# Patient Record
Sex: FEMALE | Race: WHITE | NOT HISPANIC OR LATINO
[De-identification: names, ages, dates, MRNs, and addresses within clinical notes are randomized per-mention and may not be internally consistent; named-entity substitution may affect disease eponyms.]

---

## 2017-01-09 ENCOUNTER — NON-APPOINTMENT (OUTPATIENT)
Age: 57
End: 2017-01-09

## 2017-01-09 ENCOUNTER — APPOINTMENT (OUTPATIENT)
Dept: CARDIOLOGY | Facility: CLINIC | Age: 57
End: 2017-01-09

## 2017-01-09 VITALS
DIASTOLIC BLOOD PRESSURE: 70 MMHG | OXYGEN SATURATION: 98 % | BODY MASS INDEX: 24.66 KG/M2 | HEIGHT: 65 IN | HEART RATE: 65 BPM | WEIGHT: 148 LBS | SYSTOLIC BLOOD PRESSURE: 115 MMHG

## 2017-01-18 ENCOUNTER — APPOINTMENT (OUTPATIENT)
Dept: CARDIOLOGY | Facility: CLINIC | Age: 57
End: 2017-01-18

## 2017-01-24 ENCOUNTER — APPOINTMENT (OUTPATIENT)
Dept: ELECTROPHYSIOLOGY | Facility: CLINIC | Age: 57
End: 2017-01-24

## 2017-01-24 VITALS — HEART RATE: 65 BPM | SYSTOLIC BLOOD PRESSURE: 109 MMHG | DIASTOLIC BLOOD PRESSURE: 65 MMHG

## 2017-01-31 ENCOUNTER — APPOINTMENT (OUTPATIENT)
Dept: DERMATOLOGY | Facility: CLINIC | Age: 57
End: 2017-01-31

## 2017-01-31 VITALS — DIASTOLIC BLOOD PRESSURE: 60 MMHG | SYSTOLIC BLOOD PRESSURE: 110 MMHG

## 2017-01-31 DIAGNOSIS — B07.9 VIRAL WART, UNSPECIFIED: ICD-10-CM

## 2017-02-28 ENCOUNTER — APPOINTMENT (OUTPATIENT)
Dept: ELECTROPHYSIOLOGY | Facility: CLINIC | Age: 57
End: 2017-02-28

## 2017-03-17 ENCOUNTER — APPOINTMENT (OUTPATIENT)
Dept: ELECTROPHYSIOLOGY | Facility: CLINIC | Age: 57
End: 2017-03-17

## 2017-03-17 ENCOUNTER — NON-APPOINTMENT (OUTPATIENT)
Age: 57
End: 2017-03-17

## 2017-03-17 VITALS — DIASTOLIC BLOOD PRESSURE: 59 MMHG | SYSTOLIC BLOOD PRESSURE: 94 MMHG

## 2017-03-17 VITALS
WEIGHT: 143 LBS | SYSTOLIC BLOOD PRESSURE: 99 MMHG | BODY MASS INDEX: 23.82 KG/M2 | HEIGHT: 65 IN | OXYGEN SATURATION: 99 % | HEART RATE: 68 BPM | DIASTOLIC BLOOD PRESSURE: 61 MMHG

## 2017-03-18 LAB
ALBUMIN SERPL ELPH-MCNC: 4.3 G/DL
ALP BLD-CCNC: 72 U/L
ALT SERPL-CCNC: 12 U/L
ANION GAP SERPL CALC-SCNC: 16 MMOL/L
AST SERPL-CCNC: 22 U/L
BASOPHILS # BLD AUTO: 0.02 K/UL
BASOPHILS NFR BLD AUTO: 0.3 %
BILIRUB SERPL-MCNC: 0.2 MG/DL
BUN SERPL-MCNC: 15 MG/DL
CALCIUM SERPL-MCNC: 9.7 MG/DL
CHLORIDE SERPL-SCNC: 103 MMOL/L
CO2 SERPL-SCNC: 24 MMOL/L
CREAT SERPL-MCNC: 0.82 MG/DL
EOSINOPHIL # BLD AUTO: 0.08 K/UL
EOSINOPHIL NFR BLD AUTO: 1.2 %
GLUCOSE SERPL-MCNC: 54 MG/DL
HCT VFR BLD CALC: 37.3 %
HGB BLD-MCNC: 11.7 G/DL
IMM GRANULOCYTES NFR BLD AUTO: 0 %
LYMPHOCYTES # BLD AUTO: 1.46 K/UL
LYMPHOCYTES NFR BLD AUTO: 22.3 %
MAN DIFF?: NORMAL
MCHC RBC-ENTMCNC: 30.2 PG
MCHC RBC-ENTMCNC: 31.4 GM/DL
MCV RBC AUTO: 96.4 FL
MONOCYTES # BLD AUTO: 0.34 K/UL
MONOCYTES NFR BLD AUTO: 5.2 %
NEUTROPHILS # BLD AUTO: 4.65 K/UL
NEUTROPHILS NFR BLD AUTO: 71 %
PLATELET # BLD AUTO: 252 K/UL
POTASSIUM SERPL-SCNC: 4.5 MMOL/L
PROT SERPL-MCNC: 6.8 G/DL
RBC # BLD: 3.87 M/UL
RBC # FLD: 13 %
SODIUM SERPL-SCNC: 143 MMOL/L
TSH SERPL-ACNC: 2.03 UIU/ML
WBC # FLD AUTO: 6.55 K/UL

## 2017-04-05 ENCOUNTER — TRANSCRIPTION ENCOUNTER (OUTPATIENT)
Age: 57
End: 2017-04-05

## 2017-04-06 ENCOUNTER — INPATIENT (INPATIENT)
Facility: HOSPITAL | Age: 57
LOS: 0 days | Discharge: HOME | End: 2017-04-07
Attending: INTERNAL MEDICINE | Admitting: INTERNAL MEDICINE
Payer: COMMERCIAL

## 2017-04-06 VITALS
HEART RATE: 67 BPM | RESPIRATION RATE: 16 BRPM | TEMPERATURE: 98 F | SYSTOLIC BLOOD PRESSURE: 97 MMHG | OXYGEN SATURATION: 99 % | DIASTOLIC BLOOD PRESSURE: 60 MMHG

## 2017-04-06 DIAGNOSIS — I49.5 SICK SINUS SYNDROME: ICD-10-CM

## 2017-04-06 LAB — HCG UR QL: NEGATIVE — SIGNIFICANT CHANGE UP

## 2017-04-06 PROCEDURE — 93010 ELECTROCARDIOGRAM REPORT: CPT

## 2017-04-06 RX ORDER — ONDANSETRON 8 MG/1
4 TABLET, FILM COATED ORAL ONCE
Qty: 0 | Refills: 0 | Status: COMPLETED | OUTPATIENT
Start: 2017-04-06 | End: 2017-04-06

## 2017-04-06 RX ORDER — VANCOMYCIN HCL 1 G
1000 VIAL (EA) INTRAVENOUS ONCE
Qty: 0 | Refills: 0 | Status: COMPLETED | OUTPATIENT
Start: 2017-04-06 | End: 2017-04-06

## 2017-04-06 RX ORDER — FENTANYL CITRATE 50 UG/ML
25 INJECTION INTRAVENOUS ONCE
Qty: 0 | Refills: 0 | Status: DISCONTINUED | OUTPATIENT
Start: 2017-04-06 | End: 2017-04-06

## 2017-04-06 RX ORDER — ACETAMINOPHEN 500 MG
1000 TABLET ORAL ONCE
Qty: 0 | Refills: 0 | Status: COMPLETED | OUTPATIENT
Start: 2017-04-06 | End: 2017-04-06

## 2017-04-06 RX ORDER — KETOROLAC TROMETHAMINE 30 MG/ML
30 SYRINGE (ML) INJECTION ONCE
Qty: 0 | Refills: 0 | Status: DISCONTINUED | OUTPATIENT
Start: 2017-04-06 | End: 2017-04-06

## 2017-04-06 RX ORDER — SODIUM CHLORIDE 9 MG/ML
500 INJECTION INTRAMUSCULAR; INTRAVENOUS; SUBCUTANEOUS ONCE
Qty: 0 | Refills: 0 | Status: COMPLETED | OUTPATIENT
Start: 2017-04-06 | End: 2017-04-06

## 2017-04-06 RX ORDER — TRAMADOL HYDROCHLORIDE 50 MG/1
50 TABLET ORAL EVERY 6 HOURS
Qty: 0 | Refills: 0 | Status: DISCONTINUED | OUTPATIENT
Start: 2017-04-06 | End: 2017-04-07

## 2017-04-06 RX ORDER — SODIUM CHLORIDE 9 MG/ML
3 INJECTION INTRAMUSCULAR; INTRAVENOUS; SUBCUTANEOUS EVERY 8 HOURS
Qty: 0 | Refills: 0 | Status: DISCONTINUED | OUTPATIENT
Start: 2017-04-06 | End: 2017-04-07

## 2017-04-06 RX ORDER — HYDROMORPHONE HYDROCHLORIDE 2 MG/ML
2 INJECTION INTRAMUSCULAR; INTRAVENOUS; SUBCUTANEOUS
Qty: 0 | Refills: 0 | Status: DISCONTINUED | OUTPATIENT
Start: 2017-04-06 | End: 2017-04-07

## 2017-04-06 RX ORDER — LAMOTRIGINE 25 MG/1
100 TABLET, ORALLY DISINTEGRATING ORAL DAILY
Qty: 0 | Refills: 0 | Status: DISCONTINUED | OUTPATIENT
Start: 2017-04-06 | End: 2017-04-07

## 2017-04-06 RX ORDER — OXYCODONE HYDROCHLORIDE 5 MG/1
5 TABLET ORAL ONCE
Qty: 0 | Refills: 0 | Status: DISCONTINUED | OUTPATIENT
Start: 2017-04-06 | End: 2017-04-06

## 2017-04-06 RX ORDER — LAMOTRIGINE 25 MG/1
100 TABLET, ORALLY DISINTEGRATING ORAL ONCE
Qty: 0 | Refills: 0 | Status: COMPLETED | OUTPATIENT
Start: 2017-04-06 | End: 2017-04-06

## 2017-04-06 RX ORDER — TRAMADOL HYDROCHLORIDE 50 MG/1
50 TABLET ORAL EVERY 6 HOURS
Qty: 0 | Refills: 0 | Status: DISCONTINUED | OUTPATIENT
Start: 2017-04-06 | End: 2017-04-06

## 2017-04-06 RX ORDER — LAMOTRIGINE 25 MG/1
150 TABLET, ORALLY DISINTEGRATING ORAL AT BEDTIME
Qty: 0 | Refills: 0 | Status: DISCONTINUED | OUTPATIENT
Start: 2017-04-06 | End: 2017-04-07

## 2017-04-06 RX ADMIN — FENTANYL CITRATE 25 MICROGRAM(S): 50 INJECTION INTRAVENOUS at 12:58

## 2017-04-06 RX ADMIN — Medication 30 MILLIGRAM(S): at 13:19

## 2017-04-06 RX ADMIN — OXYCODONE HYDROCHLORIDE 5 MILLIGRAM(S): 5 TABLET ORAL at 16:32

## 2017-04-06 RX ADMIN — Medication 250 MILLIGRAM(S): at 18:09

## 2017-04-06 RX ADMIN — SODIUM CHLORIDE 3 MILLILITER(S): 9 INJECTION INTRAMUSCULAR; INTRAVENOUS; SUBCUTANEOUS at 21:31

## 2017-04-06 RX ADMIN — Medication 200 MILLIGRAM(S): at 13:12

## 2017-04-06 RX ADMIN — SODIUM CHLORIDE 1000 MILLILITER(S): 9 INJECTION INTRAMUSCULAR; INTRAVENOUS; SUBCUTANEOUS at 10:59

## 2017-04-06 RX ADMIN — LAMOTRIGINE 150 MILLIGRAM(S): 25 TABLET, ORALLY DISINTEGRATING ORAL at 22:03

## 2017-04-06 RX ADMIN — Medication 1000 MILLIGRAM(S): at 14:00

## 2017-04-06 RX ADMIN — Medication 30 MILLIGRAM(S): at 14:00

## 2017-04-06 RX ADMIN — HYDROMORPHONE HYDROCHLORIDE 2 MILLIGRAM(S): 2 INJECTION INTRAMUSCULAR; INTRAVENOUS; SUBCUTANEOUS at 21:46

## 2017-04-06 RX ADMIN — HYDROMORPHONE HYDROCHLORIDE 2 MILLIGRAM(S): 2 INJECTION INTRAMUSCULAR; INTRAVENOUS; SUBCUTANEOUS at 21:31

## 2017-04-06 RX ADMIN — OXYCODONE HYDROCHLORIDE 5 MILLIGRAM(S): 5 TABLET ORAL at 15:42

## 2017-04-06 RX ADMIN — TRAMADOL HYDROCHLORIDE 50 MILLIGRAM(S): 50 TABLET ORAL at 11:15

## 2017-04-06 RX ADMIN — LAMOTRIGINE 100 MILLIGRAM(S): 25 TABLET, ORALLY DISINTEGRATING ORAL at 11:15

## 2017-04-06 RX ADMIN — SODIUM CHLORIDE 3 MILLILITER(S): 9 INJECTION INTRAMUSCULAR; INTRAVENOUS; SUBCUTANEOUS at 14:00

## 2017-04-06 RX ADMIN — ONDANSETRON 4 MILLIGRAM(S): 8 TABLET, FILM COATED ORAL at 23:04

## 2017-04-06 RX ADMIN — TRAMADOL HYDROCHLORIDE 50 MILLIGRAM(S): 50 TABLET ORAL at 12:59

## 2017-04-06 NOTE — H&P CARDIOLOGY - PSH
h/o cardiac pacemaker insertion - 2004    H/O colonoscopy    History of D&C - 15 yrs ago    History of laparoscopy - many yrs ago

## 2017-04-06 NOTE — PATIENT PROFILE ADULT. - TEACHING/LEARNING LEARNING PREFERENCES
pictorial/computer/internet/video/audio/individual instruction/verbal instruction/skill demonstration

## 2017-04-06 NOTE — H&P CARDIOLOGY - PMH
Endometriosis, Mild    EP (Epilepsy)  - seizure x 2 - 2008, and in 2010    h/o - left Tennis Elbow    Hx of sleep apnea - had sleep study done in ? 2005 - does not use CPAP machine    Pacemaker - insertion in 2004

## 2017-04-06 NOTE — H&P CARDIOLOGY - HISTORY OF PRESENT ILLNESS
56 year old female with COPD, seizure disorder (last 7years ago), REJI, IUD in place, cervial spinal stenosis, brief paroxysmal AF and NSVT, PPM 2004 who presents for PPM generator change for VILMA.     Echo 1/18/17: EF 61%, minimal MR, mild TR, normal pulmonary pressures    please see hard copy H&P in paper chart 3/17/17  PATIENT SEEN AND EXAMINED AND NO NEW CLINICAL CHANGES SINCE office visit

## 2017-04-07 ENCOUNTER — TRANSCRIPTION ENCOUNTER (OUTPATIENT)
Age: 57
End: 2017-04-07

## 2017-04-07 VITALS
RESPIRATION RATE: 18 BRPM | DIASTOLIC BLOOD PRESSURE: 59 MMHG | HEART RATE: 65 BPM | TEMPERATURE: 99 F | OXYGEN SATURATION: 100 % | SYSTOLIC BLOOD PRESSURE: 99 MMHG

## 2017-04-07 LAB
BASOPHILS # BLD AUTO: 0.01 K/UL — SIGNIFICANT CHANGE UP (ref 0–0.2)
BASOPHILS NFR BLD AUTO: 0.1 % — SIGNIFICANT CHANGE UP (ref 0–2)
BUN SERPL-MCNC: 13 MG/DL — SIGNIFICANT CHANGE UP (ref 7–23)
CALCIUM SERPL-MCNC: 9.4 MG/DL — SIGNIFICANT CHANGE UP (ref 8.4–10.5)
CHLORIDE SERPL-SCNC: 100 MMOL/L — SIGNIFICANT CHANGE UP (ref 98–107)
CO2 SERPL-SCNC: 22 MMOL/L — SIGNIFICANT CHANGE UP (ref 22–31)
CREAT SERPL-MCNC: 0.7 MG/DL — SIGNIFICANT CHANGE UP (ref 0.5–1.3)
EOSINOPHIL # BLD AUTO: 0.01 K/UL — SIGNIFICANT CHANGE UP (ref 0–0.5)
EOSINOPHIL NFR BLD AUTO: 0.1 % — SIGNIFICANT CHANGE UP (ref 0–6)
GLUCOSE SERPL-MCNC: 95 MG/DL — SIGNIFICANT CHANGE UP (ref 70–99)
HCT VFR BLD CALC: 34.6 % — SIGNIFICANT CHANGE UP (ref 34.5–45)
HGB BLD-MCNC: 11.3 G/DL — LOW (ref 11.5–15.5)
IMM GRANULOCYTES NFR BLD AUTO: 0.1 % — SIGNIFICANT CHANGE UP (ref 0–1.5)
LYMPHOCYTES # BLD AUTO: 1.03 K/UL — SIGNIFICANT CHANGE UP (ref 1–3.3)
LYMPHOCYTES # BLD AUTO: 10.5 % — LOW (ref 13–44)
MAGNESIUM SERPL-MCNC: 2.1 MG/DL — SIGNIFICANT CHANGE UP (ref 1.6–2.6)
MCHC RBC-ENTMCNC: 30.5 PG — SIGNIFICANT CHANGE UP (ref 27–34)
MCHC RBC-ENTMCNC: 32.7 % — SIGNIFICANT CHANGE UP (ref 32–36)
MCV RBC AUTO: 93.5 FL — SIGNIFICANT CHANGE UP (ref 80–100)
MONOCYTES # BLD AUTO: 0.64 K/UL — SIGNIFICANT CHANGE UP (ref 0–0.9)
MONOCYTES NFR BLD AUTO: 6.5 % — SIGNIFICANT CHANGE UP (ref 2–14)
NEUTROPHILS # BLD AUTO: 8.12 K/UL — HIGH (ref 1.8–7.4)
NEUTROPHILS NFR BLD AUTO: 82.7 % — HIGH (ref 43–77)
PLATELET # BLD AUTO: 228 K/UL — SIGNIFICANT CHANGE UP (ref 150–400)
PMV BLD: 10.1 FL — SIGNIFICANT CHANGE UP (ref 7–13)
POTASSIUM SERPL-MCNC: 4.7 MMOL/L — SIGNIFICANT CHANGE UP (ref 3.5–5.3)
POTASSIUM SERPL-SCNC: 4.7 MMOL/L — SIGNIFICANT CHANGE UP (ref 3.5–5.3)
RBC # BLD: 3.7 M/UL — LOW (ref 3.8–5.2)
RBC # FLD: 12.6 % — SIGNIFICANT CHANGE UP (ref 10.3–14.5)
SODIUM SERPL-SCNC: 140 MMOL/L — SIGNIFICANT CHANGE UP (ref 135–145)
WBC # BLD: 9.82 K/UL — SIGNIFICANT CHANGE UP (ref 3.8–10.5)
WBC # FLD AUTO: 9.82 K/UL — SIGNIFICANT CHANGE UP (ref 3.8–10.5)

## 2017-04-07 RX ORDER — ONDANSETRON 8 MG/1
4 TABLET, FILM COATED ORAL ONCE
Qty: 0 | Refills: 0 | Status: COMPLETED | OUTPATIENT
Start: 2017-04-07 | End: 2017-04-07

## 2017-04-07 RX ORDER — IBUPROFEN 200 MG
1 TABLET ORAL
Qty: 30 | Refills: 0 | OUTPATIENT
Start: 2017-04-07

## 2017-04-07 RX ORDER — ONDANSETRON 8 MG/1
1 TABLET, FILM COATED ORAL
Qty: 32 | Refills: 0 | OUTPATIENT
Start: 2017-04-07 | End: 2017-04-15

## 2017-04-07 RX ORDER — OXYCODONE HYDROCHLORIDE 5 MG/1
1 TABLET ORAL
Qty: 12 | Refills: 0 | OUTPATIENT
Start: 2017-04-07 | End: 2017-04-10

## 2017-04-07 RX ADMIN — LAMOTRIGINE 100 MILLIGRAM(S): 25 TABLET, ORALLY DISINTEGRATING ORAL at 11:23

## 2017-04-07 RX ADMIN — ONDANSETRON 4 MILLIGRAM(S): 8 TABLET, FILM COATED ORAL at 11:38

## 2017-04-07 RX ADMIN — SODIUM CHLORIDE 3 MILLILITER(S): 9 INJECTION INTRAMUSCULAR; INTRAVENOUS; SUBCUTANEOUS at 13:19

## 2017-04-07 RX ADMIN — ONDANSETRON 4 MILLIGRAM(S): 8 TABLET, FILM COATED ORAL at 03:51

## 2017-04-07 RX ADMIN — SODIUM CHLORIDE 3 MILLILITER(S): 9 INJECTION INTRAMUSCULAR; INTRAVENOUS; SUBCUTANEOUS at 05:15

## 2017-04-07 NOTE — DISCHARGE NOTE ADULT - CARE PROVIDERS DIRECT ADDRESSES
[email or web address]

## 2017-04-07 NOTE — DISCHARGE NOTE ADULT - HOSPITAL COURSE
This is a 57 yo f s/p Generator change. Patient stable for discharge to home on 4/7. Patient had subpectoral PPM placement.

## 2017-04-07 NOTE — DISCHARGE NOTE ADULT - CARE PROVIDER_API CALL
Catracho Khan (MD), Cardiac Electrophysiology; Cardiovascular Disease; Internal Medicine  [address] [place] [address]  Phone: [phone]  Fax: [phone]

## 2017-04-07 NOTE — DISCHARGE NOTE ADULT - PATIENT PORTAL LINK FT
“You can access the FollowHealth Patient Portal, offered by [facility], by registering with the following website: [email or web address]”

## 2017-04-07 NOTE — DISCHARGE NOTE ADULT - CARE PLAN
Principal Discharge DX:	Pacemaker at end of battery life  Goal:	follow up outpatient  Instructions for follow-up, activity and diet:	4/20 at 8:45 am at the device clinic

## 2017-04-07 NOTE — DISCHARGE NOTE ADULT - MEDICATION SUMMARY - MEDICATIONS TO TAKE
I will START or STAY ON the medications listed below when I get home from the hospital:    acetaminophen-oxyCODONE 325 mg-5 mg oral tablet  -- 1 tab(s) by mouth every 6 hours -for moderate pain MDD:4 tablets  -- Caution federal law prohibits the transfer of this drug to any person other  than the person for whom it was prescribed.  May cause drowsiness.  Alcohol may intensify this effect.  Use care when operating dangerous machinery.  This prescription cannot be refilled.  This product contains acetaminophen.  Do not use  with any other product containing acetaminophen to prevent possible liver damage.  Using more of this medication than prescribed may cause serious breathing problems.    -- Indication: For pain     ibuprofen 400 mg oral tablet  -- 1 tab(s) by mouth every 6 hours as needed for moderate pain   -- Do not take this drug if you are pregnant.  It is very important that you take or use this exactly as directed.  Do not skip doses or discontinue unless directed by your doctor.  May cause drowsiness or dizziness.  Obtain medical advice before taking any non-prescription drugs as some may affect the action of this medication.  Take with food or milk.    -- Indication: For pain     lamoTRIgine 100 mg oral tablet  -- 1 tab(s) by mouth once a day (in the morning)  -- Indication: For Seizure    lamoTRIgine 150 mg oral tablet  -- 1 tab(s) by mouth once a day (at bedtime)  -- Indication: For Seizure    atenolol 25 mg oral tablet  -- 1 tab(s) by mouth once a day  -- Indication: For HTN I will START or STAY ON the medications listed below when I get home from the hospital:    acetaminophen-oxyCODONE 325 mg-5 mg oral tablet  -- 1 tab(s) by mouth every 6 hours -for moderate pain MDD:4 tablets  -- Caution federal law prohibits the transfer of this drug to any person other  than the person for whom it was prescribed.  May cause drowsiness.  Alcohol may intensify this effect.  Use care when operating dangerous machinery.  This prescription cannot be refilled.  This product contains acetaminophen.  Do not use  with any other product containing acetaminophen to prevent possible liver damage.  Using more of this medication than prescribed may cause serious breathing problems.    -- Indication: For pain     ibuprofen 400 mg oral tablet  -- 1 tab(s) by mouth every 6 hours as needed for moderate pain   -- Do not take this drug if you are pregnant.  It is very important that you take or use this exactly as directed.  Do not skip doses or discontinue unless directed by your doctor.  May cause drowsiness or dizziness.  Obtain medical advice before taking any non-prescription drugs as some may affect the action of this medication.  Take with food or milk.    -- Indication: For pain     lamoTRIgine 100 mg oral tablet  -- 1 tab(s) by mouth once a day (in the morning)  -- Indication: For Seizure    lamoTRIgine 150 mg oral tablet  -- 1 tab(s) by mouth once a day (at bedtime)  -- Indication: For Seizure    Zofran 4 mg oral tablet  -- 1 tab(s) by mouth every 6 hours as needed for nausea  -- Indication: For nausea    atenolol 25 mg oral tablet  -- 1 tab(s) by mouth once a day  -- Indication: For HTN

## 2017-04-17 ENCOUNTER — APPOINTMENT (OUTPATIENT)
Dept: DERMATOLOGY | Facility: CLINIC | Age: 57
End: 2017-04-17

## 2017-04-17 ENCOUNTER — APPOINTMENT (OUTPATIENT)
Dept: ELECTROPHYSIOLOGY | Facility: CLINIC | Age: 57
End: 2017-04-17

## 2017-04-17 VITALS — SYSTOLIC BLOOD PRESSURE: 100 MMHG | DIASTOLIC BLOOD PRESSURE: 60 MMHG

## 2017-04-17 DIAGNOSIS — I78.1 NEVUS, NON-NEOPLASTIC: ICD-10-CM

## 2017-06-20 ENCOUNTER — APPOINTMENT (OUTPATIENT)
Dept: DERMATOLOGY | Facility: CLINIC | Age: 57
End: 2017-06-20

## 2017-06-20 VITALS — SYSTOLIC BLOOD PRESSURE: 100 MMHG | DIASTOLIC BLOOD PRESSURE: 52 MMHG

## 2017-06-20 DIAGNOSIS — L66.1 LICHEN PLANOPILARIS: ICD-10-CM

## 2017-06-20 DIAGNOSIS — L65.9 NONSCARRING HAIR LOSS, UNSPECIFIED: ICD-10-CM

## 2017-06-30 ENCOUNTER — RX RENEWAL (OUTPATIENT)
Age: 57
End: 2017-06-30

## 2017-07-31 ENCOUNTER — APPOINTMENT (OUTPATIENT)
Dept: ELECTROPHYSIOLOGY | Facility: CLINIC | Age: 57
End: 2017-07-31
Payer: COMMERCIAL

## 2017-07-31 PROCEDURE — 93294 REM INTERROG EVL PM/LDLS PM: CPT

## 2017-07-31 PROCEDURE — 93296 REM INTERROG EVL PM/IDS: CPT

## 2017-08-07 ENCOUNTER — APPOINTMENT (OUTPATIENT)
Dept: ELECTROPHYSIOLOGY | Facility: CLINIC | Age: 57
End: 2017-08-07

## 2017-08-08 ENCOUNTER — APPOINTMENT (OUTPATIENT)
Dept: ELECTROPHYSIOLOGY | Facility: CLINIC | Age: 57
End: 2017-08-08

## 2017-08-16 ENCOUNTER — APPOINTMENT (OUTPATIENT)
Dept: NEUROLOGY | Facility: CLINIC | Age: 57
End: 2017-08-16

## 2017-08-28 ENCOUNTER — APPOINTMENT (OUTPATIENT)
Dept: OPHTHALMOLOGY | Facility: CLINIC | Age: 57
End: 2017-08-28
Payer: COMMERCIAL

## 2017-08-28 DIAGNOSIS — L03.213 PERIORBITAL CELLULITIS: ICD-10-CM

## 2017-08-28 DIAGNOSIS — H00.15 CHALAZION LEFT LOWER EYELID: ICD-10-CM

## 2017-08-28 PROCEDURE — 92012 INTRM OPH EXAM EST PATIENT: CPT

## 2017-09-12 ENCOUNTER — APPOINTMENT (OUTPATIENT)
Dept: NEUROLOGY | Facility: CLINIC | Age: 57
End: 2017-09-12

## 2017-09-26 ENCOUNTER — TRANSCRIPTION ENCOUNTER (OUTPATIENT)
Age: 57
End: 2017-09-26

## 2017-09-28 ENCOUNTER — APPOINTMENT (OUTPATIENT)
Dept: OPHTHALMOLOGY | Facility: CLINIC | Age: 57
End: 2017-09-28

## 2017-10-30 ENCOUNTER — APPOINTMENT (OUTPATIENT)
Dept: ELECTROPHYSIOLOGY | Facility: CLINIC | Age: 57
End: 2017-10-30
Payer: COMMERCIAL

## 2017-10-30 PROCEDURE — 93280 PM DEVICE PROGR EVAL DUAL: CPT

## 2017-11-29 ENCOUNTER — TRANSCRIPTION ENCOUNTER (OUTPATIENT)
Age: 57
End: 2017-11-29

## 2017-12-04 ENCOUNTER — APPOINTMENT (OUTPATIENT)
Dept: CARDIOLOGY | Facility: CLINIC | Age: 57
End: 2017-12-04
Payer: COMMERCIAL

## 2017-12-04 ENCOUNTER — NON-APPOINTMENT (OUTPATIENT)
Age: 57
End: 2017-12-04

## 2017-12-04 VITALS
WEIGHT: 143 LBS | HEART RATE: 69 BPM | OXYGEN SATURATION: 100 % | BODY MASS INDEX: 23.82 KG/M2 | SYSTOLIC BLOOD PRESSURE: 120 MMHG | HEIGHT: 65 IN | DIASTOLIC BLOOD PRESSURE: 71 MMHG

## 2017-12-04 PROCEDURE — 93000 ELECTROCARDIOGRAM COMPLETE: CPT

## 2017-12-04 PROCEDURE — 99214 OFFICE O/P EST MOD 30 MIN: CPT

## 2017-12-05 ENCOUNTER — RESULT CHARGE (OUTPATIENT)
Age: 57
End: 2017-12-05

## 2017-12-09 ENCOUNTER — OTHER (OUTPATIENT)
Age: 57
End: 2017-12-09

## 2017-12-11 ENCOUNTER — APPOINTMENT (OUTPATIENT)
Dept: MAMMOGRAPHY | Facility: CLINIC | Age: 57
End: 2017-12-11
Payer: COMMERCIAL

## 2017-12-11 ENCOUNTER — APPOINTMENT (OUTPATIENT)
Dept: ULTRASOUND IMAGING | Facility: CLINIC | Age: 57
End: 2017-12-11
Payer: COMMERCIAL

## 2017-12-11 ENCOUNTER — OUTPATIENT (OUTPATIENT)
Dept: OUTPATIENT SERVICES | Facility: HOSPITAL | Age: 57
LOS: 1 days | End: 2017-12-11
Payer: COMMERCIAL

## 2017-12-11 DIAGNOSIS — Z12.31 ENCOUNTER FOR SCREENING MAMMOGRAM FOR MALIGNANT NEOPLASM OF BREAST: ICD-10-CM

## 2017-12-11 DIAGNOSIS — N60.19 DIFFUSE CYSTIC MASTOPATHY OF UNSPECIFIED BREAST: ICD-10-CM

## 2017-12-11 PROCEDURE — 77067 SCR MAMMO BI INCL CAD: CPT

## 2017-12-11 PROCEDURE — G0202: CPT | Mod: 26

## 2017-12-11 PROCEDURE — 76641 ULTRASOUND BREAST COMPLETE: CPT | Mod: 26,50

## 2017-12-11 PROCEDURE — 77063 BREAST TOMOSYNTHESIS BI: CPT | Mod: 26

## 2017-12-11 PROCEDURE — 77063 BREAST TOMOSYNTHESIS BI: CPT

## 2017-12-11 PROCEDURE — 76641 ULTRASOUND BREAST COMPLETE: CPT

## 2018-01-03 ENCOUNTER — RX RENEWAL (OUTPATIENT)
Age: 58
End: 2018-01-03

## 2018-01-15 ENCOUNTER — APPOINTMENT (OUTPATIENT)
Dept: CARDIOLOGY | Facility: CLINIC | Age: 58
End: 2018-01-15

## 2018-01-17 ENCOUNTER — RESULT REVIEW (OUTPATIENT)
Age: 58
End: 2018-01-17

## 2018-01-22 ENCOUNTER — APPOINTMENT (OUTPATIENT)
Dept: CARDIOLOGY | Facility: CLINIC | Age: 58
End: 2018-01-22
Payer: COMMERCIAL

## 2018-01-22 ENCOUNTER — NON-APPOINTMENT (OUTPATIENT)
Age: 58
End: 2018-01-22

## 2018-01-22 VITALS
HEIGHT: 65 IN | HEART RATE: 65 BPM | DIASTOLIC BLOOD PRESSURE: 69 MMHG | OXYGEN SATURATION: 99 % | SYSTOLIC BLOOD PRESSURE: 119 MMHG | WEIGHT: 145 LBS | BODY MASS INDEX: 24.16 KG/M2

## 2018-01-22 PROCEDURE — 93000 ELECTROCARDIOGRAM COMPLETE: CPT

## 2018-01-22 PROCEDURE — 99214 OFFICE O/P EST MOD 30 MIN: CPT

## 2018-01-29 ENCOUNTER — APPOINTMENT (OUTPATIENT)
Dept: CARDIOLOGY | Facility: CLINIC | Age: 58
End: 2018-01-29
Payer: COMMERCIAL

## 2018-01-29 PROCEDURE — 93306 TTE W/DOPPLER COMPLETE: CPT

## 2018-02-01 ENCOUNTER — APPOINTMENT (OUTPATIENT)
Dept: ULTRASOUND IMAGING | Facility: CLINIC | Age: 58
End: 2018-02-01

## 2018-02-01 ENCOUNTER — OUTPATIENT (OUTPATIENT)
Dept: OUTPATIENT SERVICES | Facility: HOSPITAL | Age: 58
LOS: 1 days | End: 2018-02-01
Payer: COMMERCIAL

## 2018-02-01 ENCOUNTER — APPOINTMENT (OUTPATIENT)
Dept: ELECTROPHYSIOLOGY | Facility: CLINIC | Age: 58
End: 2018-02-01
Payer: COMMERCIAL

## 2018-02-01 DIAGNOSIS — Z00.8 ENCOUNTER FOR OTHER GENERAL EXAMINATION: ICD-10-CM

## 2018-02-01 PROCEDURE — 76700 US EXAM ABDOM COMPLETE: CPT

## 2018-02-01 PROCEDURE — 93296 REM INTERROG EVL PM/IDS: CPT

## 2018-02-01 PROCEDURE — 76700 US EXAM ABDOM COMPLETE: CPT | Mod: 26

## 2018-02-01 PROCEDURE — 93294 REM INTERROG EVL PM/LDLS PM: CPT

## 2018-02-13 ENCOUNTER — APPOINTMENT (OUTPATIENT)
Dept: GASTROENTEROLOGY | Facility: CLINIC | Age: 58
End: 2018-02-13
Payer: COMMERCIAL

## 2018-02-13 DIAGNOSIS — R10.11 RIGHT UPPER QUADRANT PAIN: ICD-10-CM

## 2018-02-13 DIAGNOSIS — R19.4 CHANGE IN BOWEL HABIT: ICD-10-CM

## 2018-02-13 PROCEDURE — 99244 OFF/OP CNSLTJ NEW/EST MOD 40: CPT

## 2018-02-13 RX ORDER — CEPHALEXIN 500 MG/1
500 CAPSULE ORAL 3 TIMES DAILY
Qty: 30 | Refills: 1 | Status: DISCONTINUED | COMMUNITY
Start: 2017-08-28 | End: 2018-02-13

## 2018-02-20 ENCOUNTER — OTHER (OUTPATIENT)
Age: 58
End: 2018-02-20

## 2018-02-22 ENCOUNTER — FORM ENCOUNTER (OUTPATIENT)
Age: 58
End: 2018-02-22

## 2018-02-23 ENCOUNTER — OUTPATIENT (OUTPATIENT)
Dept: OUTPATIENT SERVICES | Facility: HOSPITAL | Age: 58
LOS: 1 days | End: 2018-02-23
Payer: COMMERCIAL

## 2018-02-23 ENCOUNTER — APPOINTMENT (OUTPATIENT)
Dept: NUCLEAR MEDICINE | Facility: HOSPITAL | Age: 58
End: 2018-02-23

## 2018-02-23 DIAGNOSIS — Z00.00 ENCOUNTER FOR GENERAL ADULT MEDICAL EXAMINATION WITHOUT ABNORMAL FINDINGS: ICD-10-CM

## 2018-02-23 DIAGNOSIS — K82.8 OTHER SPECIFIED DISEASES OF GALLBLADDER: ICD-10-CM

## 2018-02-23 PROCEDURE — A9537: CPT

## 2018-02-23 PROCEDURE — 78227 HEPATOBIL SYST IMAGE W/DRUG: CPT | Mod: 26

## 2018-02-23 PROCEDURE — 78227 HEPATOBIL SYST IMAGE W/DRUG: CPT

## 2018-03-06 ENCOUNTER — APPOINTMENT (OUTPATIENT)
Dept: GASTROENTEROLOGY | Facility: HOSPITAL | Age: 58
End: 2018-03-06

## 2018-03-06 ENCOUNTER — OUTPATIENT (OUTPATIENT)
Dept: OUTPATIENT SERVICES | Facility: HOSPITAL | Age: 58
LOS: 1 days | End: 2018-03-06
Payer: COMMERCIAL

## 2018-03-06 ENCOUNTER — RESULT REVIEW (OUTPATIENT)
Age: 58
End: 2018-03-06

## 2018-03-06 DIAGNOSIS — R10.9 UNSPECIFIED ABDOMINAL PAIN: ICD-10-CM

## 2018-03-06 PROCEDURE — 43259 EGD US EXAM DUODENUM/JEJUNUM: CPT | Mod: GC

## 2018-03-06 PROCEDURE — 43239 EGD BIOPSY SINGLE/MULTIPLE: CPT | Mod: 59,GC

## 2018-03-06 PROCEDURE — 43259 EGD US EXAM DUODENUM/JEJUNUM: CPT

## 2018-03-06 PROCEDURE — 43239 EGD BIOPSY SINGLE/MULTIPLE: CPT | Mod: XS

## 2018-03-20 ENCOUNTER — APPOINTMENT (OUTPATIENT)
Dept: GASTROENTEROLOGY | Facility: HOSPITAL | Age: 58
End: 2018-03-20

## 2018-04-12 ENCOUNTER — TRANSCRIPTION ENCOUNTER (OUTPATIENT)
Age: 58
End: 2018-04-12

## 2018-04-13 ENCOUNTER — APPOINTMENT (OUTPATIENT)
Dept: GASTROENTEROLOGY | Facility: HOSPITAL | Age: 58
End: 2018-04-13

## 2018-04-13 ENCOUNTER — RESULT REVIEW (OUTPATIENT)
Age: 58
End: 2018-04-13

## 2018-04-13 ENCOUNTER — OUTPATIENT (OUTPATIENT)
Dept: OUTPATIENT SERVICES | Facility: HOSPITAL | Age: 58
LOS: 1 days | End: 2018-04-13
Payer: COMMERCIAL

## 2018-04-13 DIAGNOSIS — D64.9 ANEMIA, UNSPECIFIED: ICD-10-CM

## 2018-04-13 LAB — HCG SERPL-ACNC: <1 MIU/ML — SIGNIFICANT CHANGE UP

## 2018-04-13 PROCEDURE — 88305 TISSUE EXAM BY PATHOLOGIST: CPT | Mod: 26

## 2018-04-13 PROCEDURE — 45385 COLONOSCOPY W/LESION REMOVAL: CPT

## 2018-04-13 PROCEDURE — 84702 CHORIONIC GONADOTROPIN TEST: CPT

## 2018-04-30 ENCOUNTER — APPOINTMENT (OUTPATIENT)
Dept: ELECTROPHYSIOLOGY | Facility: CLINIC | Age: 58
End: 2018-04-30

## 2018-05-01 ENCOUNTER — APPOINTMENT (OUTPATIENT)
Dept: NEUROLOGY | Facility: CLINIC | Age: 58
End: 2018-05-01
Payer: COMMERCIAL

## 2018-05-01 DIAGNOSIS — M48.02 SPINAL STENOSIS, CERVICAL REGION: ICD-10-CM

## 2018-05-01 DIAGNOSIS — G56.10 OTHER LESIONS OF MEDIAN NERVE, UNSPECIFIED UPPER LIMB: ICD-10-CM

## 2018-05-01 PROCEDURE — 99215 OFFICE O/P EST HI 40 MIN: CPT

## 2018-05-07 ENCOUNTER — APPOINTMENT (OUTPATIENT)
Dept: GASTROENTEROLOGY | Facility: CLINIC | Age: 58
End: 2018-05-07
Payer: COMMERCIAL

## 2018-05-07 VITALS
OXYGEN SATURATION: 99 % | DIASTOLIC BLOOD PRESSURE: 69 MMHG | SYSTOLIC BLOOD PRESSURE: 114 MMHG | HEART RATE: 65 BPM | WEIGHT: 144 LBS | BODY MASS INDEX: 23.99 KG/M2 | HEIGHT: 65 IN

## 2018-05-07 DIAGNOSIS — D12.6 BENIGN NEOPLASM OF COLON, UNSPECIFIED: ICD-10-CM

## 2018-05-07 PROCEDURE — 99214 OFFICE O/P EST MOD 30 MIN: CPT

## 2018-05-08 PROBLEM — D12.6 TUBULAR ADENOMA OF COLON: Status: ACTIVE | Noted: 2018-05-08

## 2018-05-09 ENCOUNTER — APPOINTMENT (OUTPATIENT)
Dept: BARIATRICS | Facility: CLINIC | Age: 58
End: 2018-05-09
Payer: COMMERCIAL

## 2018-05-09 VITALS
DIASTOLIC BLOOD PRESSURE: 64 MMHG | WEIGHT: 140.65 LBS | HEART RATE: 65 BPM | SYSTOLIC BLOOD PRESSURE: 108 MMHG | HEIGHT: 65 IN | BODY MASS INDEX: 23.43 KG/M2 | OXYGEN SATURATION: 97 %

## 2018-05-09 PROCEDURE — 99243 OFF/OP CNSLTJ NEW/EST LOW 30: CPT

## 2018-05-09 PROCEDURE — ZZZZZ: CPT

## 2018-05-10 LAB
H.PYLORI ANTIBODY IGA: 9.3 UNITS
IF BLOCK AB SER QL: NORMAL
PCA AB SER QL IF: NORMAL
VIT B12 SERPL-MCNC: 1167 PG/ML

## 2018-05-21 ENCOUNTER — APPOINTMENT (OUTPATIENT)
Dept: GASTROENTEROLOGY | Facility: CLINIC | Age: 58
End: 2018-05-21

## 2018-05-23 ENCOUNTER — APPOINTMENT (OUTPATIENT)
Dept: ELECTROPHYSIOLOGY | Facility: CLINIC | Age: 58
End: 2018-05-23
Payer: COMMERCIAL

## 2018-05-23 VITALS — RESPIRATION RATE: 14 BRPM | HEART RATE: 62 BPM | SYSTOLIC BLOOD PRESSURE: 117 MMHG | DIASTOLIC BLOOD PRESSURE: 69 MMHG

## 2018-05-23 PROCEDURE — 93280 PM DEVICE PROGR EVAL DUAL: CPT

## 2018-05-23 RX ORDER — BROMPHENIRAMINE MALEATE, PSEUDOEPHEDRINE HYDROCHLORIDE, 2; 30; 10 MG/5ML; MG/5ML; MG/5ML
30-2-10 SYRUP ORAL
Qty: 200 | Refills: 0 | Status: DISCONTINUED | COMMUNITY
Start: 2017-11-29

## 2018-05-23 RX ORDER — TOBRAMYCIN AND DEXAMETHASONE 3; 1 MG/ML; MG/ML
0.3-0.1 SUSPENSION/ DROPS OPHTHALMIC
Qty: 5 | Refills: 0 | Status: DISCONTINUED | COMMUNITY
Start: 2018-05-03

## 2018-05-23 RX ORDER — AMOXICILLIN AND CLAVULANATE POTASSIUM 875; 125 MG/1; MG/1
875-125 TABLET, COATED ORAL
Qty: 14 | Refills: 0 | Status: DISCONTINUED | COMMUNITY
Start: 2017-11-29

## 2018-05-24 LAB
BASOPHILS # BLD AUTO: 0.02 K/UL
BASOPHILS NFR BLD AUTO: 0.3 %
EOSINOPHIL # BLD AUTO: 0.07 K/UL
EOSINOPHIL NFR BLD AUTO: 1.2 %
FERRITIN SERPL-MCNC: 29 NG/ML
HCT VFR BLD CALC: 34 %
HEMOCCULT STL QL IA: NEGATIVE
HGB BLD-MCNC: 11 G/DL
IMM GRANULOCYTES NFR BLD AUTO: 0.2 %
IRON SATN MFR SERPL: 23 %
IRON SERPL-MCNC: 64 UG/DL
LYMPHOCYTES # BLD AUTO: 2 K/UL
LYMPHOCYTES NFR BLD AUTO: 34.5 %
MAN DIFF?: NORMAL
MCHC RBC-ENTMCNC: 29 PG
MCHC RBC-ENTMCNC: 32.4 GM/DL
MCV RBC AUTO: 89.7 FL
MONOCYTES # BLD AUTO: 0.39 K/UL
MONOCYTES NFR BLD AUTO: 6.7 %
NEUTROPHILS # BLD AUTO: 3.3 K/UL
NEUTROPHILS NFR BLD AUTO: 57.1 %
PLATELET # BLD AUTO: 237 K/UL
RBC # BLD: 3.79 M/UL
RBC # FLD: 15.6 %
TIBC SERPL-MCNC: 282 UG/DL
UIBC SERPL-MCNC: 218 UG/DL
WBC # FLD AUTO: 5.79 K/UL

## 2018-06-26 ENCOUNTER — APPOINTMENT (OUTPATIENT)
Dept: NEUROLOGY | Facility: CLINIC | Age: 58
End: 2018-06-26
Payer: COMMERCIAL

## 2018-06-26 PROCEDURE — 95908 NRV CNDJ TST 3-4 STUDIES: CPT

## 2018-06-26 PROCEDURE — 95886 MUSC TEST DONE W/N TEST COMP: CPT

## 2018-06-28 ENCOUNTER — APPOINTMENT (OUTPATIENT)
Dept: NEUROLOGY | Facility: CLINIC | Age: 58
End: 2018-06-28
Payer: COMMERCIAL

## 2018-06-28 PROCEDURE — 95938 SOMATOSENSORY TESTING: CPT

## 2018-07-17 ENCOUNTER — APPOINTMENT (OUTPATIENT)
Dept: NEUROSURGERY | Facility: CLINIC | Age: 58
End: 2018-07-17
Payer: COMMERCIAL

## 2018-07-17 VITALS
WEIGHT: 139 LBS | OXYGEN SATURATION: 98 % | DIASTOLIC BLOOD PRESSURE: 73 MMHG | HEIGHT: 65 IN | HEART RATE: 64 BPM | BODY MASS INDEX: 23.16 KG/M2 | TEMPERATURE: 98.2 F | SYSTOLIC BLOOD PRESSURE: 130 MMHG

## 2018-07-17 PROCEDURE — 99244 OFF/OP CNSLTJ NEW/EST MOD 40: CPT | Mod: 57

## 2018-07-20 ENCOUNTER — APPOINTMENT (OUTPATIENT)
Dept: ORTHOPEDIC SURGERY | Facility: CLINIC | Age: 58
End: 2018-07-20
Payer: COMMERCIAL

## 2018-07-20 VITALS
DIASTOLIC BLOOD PRESSURE: 57 MMHG | SYSTOLIC BLOOD PRESSURE: 95 MMHG | WEIGHT: 137 LBS | BODY MASS INDEX: 22.82 KG/M2 | HEART RATE: 66 BPM | HEIGHT: 65 IN

## 2018-07-20 DIAGNOSIS — Z82.61 FAMILY HISTORY OF ARTHRITIS: ICD-10-CM

## 2018-07-20 DIAGNOSIS — Z78.9 OTHER SPECIFIED HEALTH STATUS: ICD-10-CM

## 2018-07-20 DIAGNOSIS — Z87.39 PERSONAL HISTORY OF OTHER DISEASES OF THE MUSCULOSKELETAL SYSTEM AND CONNECTIVE TISSUE: ICD-10-CM

## 2018-07-20 DIAGNOSIS — M47.10 OTHER SPONDYLOSIS WITH MYELOPATHY, SITE UNSPECIFIED: ICD-10-CM

## 2018-07-20 PROCEDURE — 99204 OFFICE O/P NEW MOD 45 MIN: CPT

## 2018-07-21 PROBLEM — M47.10 SPONDYLOSIS WITH MYELOPATHY: Status: ACTIVE | Noted: 2018-07-21

## 2018-07-24 PROBLEM — Z78.9 DOES NOT USE ILLICIT DRUGS: Status: ACTIVE | Noted: 2018-07-20

## 2018-07-24 PROBLEM — Z82.61 FAMILY HISTORY OF ARTHRITIS: Status: ACTIVE | Noted: 2018-07-20

## 2018-07-24 PROBLEM — Z87.39 HISTORY OF HERNIATED INTERVERTEBRAL DISC: Status: RESOLVED | Noted: 2018-07-20 | Resolved: 2018-07-24

## 2018-07-26 ENCOUNTER — RX RENEWAL (OUTPATIENT)
Age: 58
End: 2018-07-26

## 2018-07-26 ENCOUNTER — MEDICATION RENEWAL (OUTPATIENT)
Age: 58
End: 2018-07-26

## 2018-07-31 ENCOUNTER — APPOINTMENT (OUTPATIENT)
Dept: ELECTROPHYSIOLOGY | Facility: CLINIC | Age: 58
End: 2018-07-31
Payer: COMMERCIAL

## 2018-07-31 PROCEDURE — 93294 REM INTERROG EVL PM/LDLS PM: CPT

## 2018-07-31 PROCEDURE — 93296 REM INTERROG EVL PM/IDS: CPT

## 2018-08-09 ENCOUNTER — RX RENEWAL (OUTPATIENT)
Age: 58
End: 2018-08-09

## 2018-08-15 ENCOUNTER — APPOINTMENT (OUTPATIENT)
Dept: CARDIOLOGY | Facility: CLINIC | Age: 58
End: 2018-08-15
Payer: COMMERCIAL

## 2018-08-15 ENCOUNTER — NON-APPOINTMENT (OUTPATIENT)
Age: 58
End: 2018-08-15

## 2018-08-15 VITALS
HEART RATE: 65 BPM | BODY MASS INDEX: 22.99 KG/M2 | OXYGEN SATURATION: 99 % | HEIGHT: 65 IN | DIASTOLIC BLOOD PRESSURE: 72 MMHG | WEIGHT: 138 LBS | SYSTOLIC BLOOD PRESSURE: 121 MMHG

## 2018-08-15 PROCEDURE — 93000 ELECTROCARDIOGRAM COMPLETE: CPT

## 2018-08-15 PROCEDURE — 99213 OFFICE O/P EST LOW 20 MIN: CPT

## 2018-08-17 ENCOUNTER — RESULT CHARGE (OUTPATIENT)
Age: 58
End: 2018-08-17

## 2018-10-03 ENCOUNTER — CLINICAL ADVICE (OUTPATIENT)
Age: 58
End: 2018-10-03

## 2018-10-25 ENCOUNTER — NON-APPOINTMENT (OUTPATIENT)
Age: 58
End: 2018-10-25

## 2018-10-25 ENCOUNTER — APPOINTMENT (OUTPATIENT)
Dept: INTERNAL MEDICINE | Facility: CLINIC | Age: 58
End: 2018-10-25
Payer: COMMERCIAL

## 2018-10-25 VITALS
DIASTOLIC BLOOD PRESSURE: 58 MMHG | HEIGHT: 65 IN | TEMPERATURE: 98.9 F | WEIGHT: 139 LBS | SYSTOLIC BLOOD PRESSURE: 106 MMHG | BODY MASS INDEX: 23.16 KG/M2

## 2018-10-25 LAB
25(OH)D3 SERPL-MCNC: 33.2 NG/ML
ALBUMIN SERPL ELPH-MCNC: 4.3 G/DL
ALP BLD-CCNC: 72 U/L
ALT SERPL-CCNC: 11 U/L
ANION GAP SERPL CALC-SCNC: 11 MMOL/L
APPEARANCE: CLEAR
AST SERPL-CCNC: 20 U/L
BACTERIA: NEGATIVE
BASOPHILS # BLD AUTO: 0.03 K/UL
BASOPHILS NFR BLD AUTO: 0.7 %
BILIRUB SERPL-MCNC: 0.2 MG/DL
BILIRUBIN URINE: NEGATIVE
BLOOD URINE: NEGATIVE
BUN SERPL-MCNC: 14 MG/DL
CALCIUM SERPL-MCNC: 9.3 MG/DL
CHLORIDE SERPL-SCNC: 104 MMOL/L
CHOLEST SERPL-MCNC: 174 MG/DL
CHOLEST/HDLC SERPL: 2.4 RATIO
CO2 SERPL-SCNC: 28 MMOL/L
COLOR: YELLOW
CREAT SERPL-MCNC: 0.82 MG/DL
EOSINOPHIL # BLD AUTO: 0.09 K/UL
EOSINOPHIL NFR BLD AUTO: 2 %
GLUCOSE QUALITATIVE U: NEGATIVE MG/DL
GLUCOSE SERPL-MCNC: 78 MG/DL
HBA1C MFR BLD HPLC: 5.6 %
HCT VFR BLD CALC: 35.2 %
HDLC SERPL-MCNC: 73 MG/DL
HGB BLD-MCNC: 11.2 G/DL
HYALINE CASTS: 0 /LPF
IMM GRANULOCYTES NFR BLD AUTO: 0 %
KETONES URINE: NEGATIVE
LDLC SERPL CALC-MCNC: 84 MG/DL
LEUKOCYTE ESTERASE URINE: NEGATIVE
LYMPHOCYTES # BLD AUTO: 1.74 K/UL
LYMPHOCYTES NFR BLD AUTO: 38.4 %
MAN DIFF?: NORMAL
MCHC RBC-ENTMCNC: 30 PG
MCHC RBC-ENTMCNC: 31.8 GM/DL
MCV RBC AUTO: 94.4 FL
MICROSCOPIC-UA: NORMAL
MONOCYTES # BLD AUTO: 0.24 K/UL
MONOCYTES NFR BLD AUTO: 5.3 %
NEUTROPHILS # BLD AUTO: 2.43 K/UL
NEUTROPHILS NFR BLD AUTO: 53.6 %
NITRITE URINE: NEGATIVE
PH URINE: 6
PLATELET # BLD AUTO: 231 K/UL
POTASSIUM SERPL-SCNC: 4.2 MMOL/L
PROT SERPL-MCNC: 6.3 G/DL
PROTEIN URINE: NEGATIVE MG/DL
RBC # BLD: 3.73 M/UL
RBC # FLD: 14.4 %
RED BLOOD CELLS URINE: 1 /HPF
SODIUM SERPL-SCNC: 143 MMOL/L
SPECIFIC GRAVITY URINE: 1.02
SQUAMOUS EPITHELIAL CELLS: 1 /HPF
TRIGL SERPL-MCNC: 86 MG/DL
TSH SERPL-ACNC: 2.5 UIU/ML
UROBILINOGEN URINE: NEGATIVE MG/DL
VIT B12 SERPL-MCNC: 1419 PG/ML
WBC # FLD AUTO: 4.53 K/UL
WHITE BLOOD CELLS URINE: 2 /HPF

## 2018-10-25 PROCEDURE — 99396 PREV VISIT EST AGE 40-64: CPT | Mod: 25

## 2018-10-25 PROCEDURE — 93000 ELECTROCARDIOGRAM COMPLETE: CPT

## 2018-10-25 NOTE — PLAN
[FreeTextEntry1] : reviewed BW with pt in depth\par labs are doing well\par She will speak with surgeon about possibly starting Gralise until she at [facility] gets her surgery as this will not be for several months.\par We will get mammo and colonoscopy results

## 2018-10-25 NOTE — HISTORY OF PRESENT ILLNESS
[FreeTextEntry1] : CPE and review of FBW [de-identified] : Pt presents to the office today for CPE and review of FBW\par She is feeling well overall.  her only complaint that has been constant is neck pain.  She just recently had another epidural and is going again for the 2nd one next week.  She is planning on having surgery but is just waiting for the best time to take off.\par She has been compliant with her medications.  No other complaints at present time\par She is up to date on Colonoscopy and Mammo

## 2018-10-25 NOTE — PHYSICAL EXAM

## 2018-11-07 ENCOUNTER — RECORD ABSTRACTING (OUTPATIENT)
Age: 58
End: 2018-11-07

## 2018-11-14 ENCOUNTER — APPOINTMENT (OUTPATIENT)
Dept: ELECTROPHYSIOLOGY | Facility: CLINIC | Age: 58
End: 2018-11-14

## 2018-12-12 ENCOUNTER — APPOINTMENT (OUTPATIENT)
Dept: ELECTROPHYSIOLOGY | Facility: CLINIC | Age: 58
End: 2018-12-12
Payer: COMMERCIAL

## 2018-12-12 VITALS — HEART RATE: 70 BPM | DIASTOLIC BLOOD PRESSURE: 58 MMHG | RESPIRATION RATE: 14 BRPM | SYSTOLIC BLOOD PRESSURE: 96 MMHG

## 2018-12-12 PROCEDURE — 93280 PM DEVICE PROGR EVAL DUAL: CPT

## 2018-12-17 ENCOUNTER — OUTPATIENT (OUTPATIENT)
Dept: OUTPATIENT SERVICES | Facility: HOSPITAL | Age: 58
LOS: 1 days | End: 2018-12-17
Payer: COMMERCIAL

## 2018-12-17 ENCOUNTER — APPOINTMENT (OUTPATIENT)
Dept: MAMMOGRAPHY | Facility: CLINIC | Age: 58
End: 2018-12-17

## 2018-12-17 ENCOUNTER — APPOINTMENT (OUTPATIENT)
Dept: ULTRASOUND IMAGING | Facility: CLINIC | Age: 58
End: 2018-12-17

## 2018-12-17 ENCOUNTER — APPOINTMENT (OUTPATIENT)
Dept: RADIOLOGY | Facility: CLINIC | Age: 58
End: 2018-12-17

## 2018-12-17 DIAGNOSIS — Z00.8 ENCOUNTER FOR OTHER GENERAL EXAMINATION: ICD-10-CM

## 2018-12-17 PROCEDURE — 76641 ULTRASOUND BREAST COMPLETE: CPT | Mod: 26,50

## 2018-12-17 PROCEDURE — 77067 SCR MAMMO BI INCL CAD: CPT

## 2018-12-17 PROCEDURE — 76641 ULTRASOUND BREAST COMPLETE: CPT

## 2018-12-17 PROCEDURE — 77080 DXA BONE DENSITY AXIAL: CPT

## 2018-12-17 PROCEDURE — 77063 BREAST TOMOSYNTHESIS BI: CPT | Mod: 26

## 2018-12-17 PROCEDURE — 77067 SCR MAMMO BI INCL CAD: CPT | Mod: 26

## 2018-12-17 PROCEDURE — 77063 BREAST TOMOSYNTHESIS BI: CPT

## 2018-12-18 PROCEDURE — 77080 DXA BONE DENSITY AXIAL: CPT | Mod: 26

## 2019-01-07 ENCOUNTER — APPOINTMENT (OUTPATIENT)
Dept: ORTHOPEDIC SURGERY | Facility: CLINIC | Age: 59
End: 2019-01-07
Payer: COMMERCIAL

## 2019-01-07 VITALS — WEIGHT: 142 LBS | HEIGHT: 65 IN | BODY MASS INDEX: 23.66 KG/M2

## 2019-01-07 DIAGNOSIS — M48.02 SPINAL STENOSIS, CERVICAL REGION: ICD-10-CM

## 2019-01-07 DIAGNOSIS — M47.812 SPONDYLOSIS W/OUT MYELOPATHY OR RADICULOPATHY, CERVICAL REGION: ICD-10-CM

## 2019-01-07 PROCEDURE — 99213 OFFICE O/P EST LOW 20 MIN: CPT

## 2019-01-07 NOTE — REASON FOR VISIT
[Follow-Up Visit] : a follow-up visit for [Cervical Myelopathy/Myopathy] : cervical myelopathy/myopathy [FreeTextEntry2] : She has been having some numbness in her left ear and some pulling in the left side of her face. She is also having pain in her left buttock pain at night only.

## 2019-01-07 NOTE — DISCUSSION/SUMMARY
[de-identified] : The patient's new symptoms of left ear and face numbness and right sided facial numbness are unlikely to be due to cervical spinal cord or nerve root compression.  I spoke with her neurologist  about these new symptoms, but she still needs to have the cervical and lumbar myelogram and CT scan given her ongoing symptoms. Dr. Gr will contact her about having a CT scan of the brain with and without contrast.  We will make decisions about spinal surgery either cervical or lumbar or both after the tests are done.

## 2019-01-07 NOTE — PHYSICAL EXAM
[de-identified] : Her exam is essentially unchanged.  There is no specific neurological change at this point.  She does not have any real tenderness in the left posterior superior iliac spine area where she says she gets that severe spasm and pain.  She continues to have bilateral Yordy signs and weakness of her right hand finger extensors and .  There is no lower extremity change, she still has hyperreflexia in the lower extremities.

## 2019-01-07 NOTE — HISTORY OF PRESENT ILLNESS
[de-identified] : Gricelda comes in with some new symptoms which include numbness of her left ear (the actual ear itself and somewhat anterior to it on the face) as well as occasional pulling sensation in the right side of her face along with numbness which comes and goes.  She has also been having severe left lower back pain especially at night when she tries to turn and very occasionally when she is walking.  It does not radiate down her leg.\par \par Continues to have symptoms coming from her neck into her left arm and hand especially the ulnar 2 fingers as well as numbness in the lateral 2 toes on the left foot.  She has pain in her right neck into her right upper arm.

## 2019-01-08 ENCOUNTER — OTHER (OUTPATIENT)
Age: 59
End: 2019-01-08

## 2019-01-09 DIAGNOSIS — R29.810 FACIAL WEAKNESS: ICD-10-CM

## 2019-01-09 DIAGNOSIS — R20.0 ANESTHESIA OF SKIN: ICD-10-CM

## 2019-01-14 ENCOUNTER — FORM ENCOUNTER (OUTPATIENT)
Age: 59
End: 2019-01-14

## 2019-01-14 ENCOUNTER — OTHER (OUTPATIENT)
Age: 59
End: 2019-01-14

## 2019-01-15 ENCOUNTER — OUTPATIENT (OUTPATIENT)
Dept: OUTPATIENT SERVICES | Facility: HOSPITAL | Age: 59
LOS: 1 days | End: 2019-01-15
Payer: COMMERCIAL

## 2019-01-15 ENCOUNTER — APPOINTMENT (OUTPATIENT)
Dept: RADIOLOGY | Facility: HOSPITAL | Age: 59
End: 2019-01-15
Payer: COMMERCIAL

## 2019-01-15 DIAGNOSIS — G95.9 DISEASE OF SPINAL CORD, UNSPECIFIED: ICD-10-CM

## 2019-01-15 DIAGNOSIS — M47.816 SPONDYLOSIS WITHOUT MYELOPATHY OR RADICULOPATHY, LUMBAR REGION: ICD-10-CM

## 2019-01-15 DIAGNOSIS — M54.12 RADICULOPATHY, CERVICAL REGION: ICD-10-CM

## 2019-01-15 DIAGNOSIS — Z00.00 ENCOUNTER FOR GENERAL ADULT MEDICAL EXAMINATION WITHOUT ABNORMAL FINDINGS: ICD-10-CM

## 2019-01-15 PROCEDURE — 72132 CT LUMBAR SPINE W/DYE: CPT | Mod: 26

## 2019-01-15 PROCEDURE — 72126 CT NECK SPINE W/DYE: CPT | Mod: 26

## 2019-01-15 PROCEDURE — 72129 CT CHEST SPINE W/DYE: CPT | Mod: 26

## 2019-01-15 PROCEDURE — 62305 MYELOGRAPHY LUMBAR INJECTION: CPT

## 2019-01-15 PROCEDURE — 70450 CT HEAD/BRAIN W/O DYE: CPT | Mod: 26

## 2019-01-15 PROCEDURE — 70470 CT HEAD/BRAIN W/O & W/DYE: CPT

## 2019-01-15 PROCEDURE — 72129 CT CHEST SPINE W/DYE: CPT

## 2019-01-15 PROCEDURE — 72126 CT NECK SPINE W/DYE: CPT

## 2019-01-15 PROCEDURE — 70450 CT HEAD/BRAIN W/O DYE: CPT

## 2019-01-15 PROCEDURE — 70470 CT HEAD/BRAIN W/O & W/DYE: CPT | Mod: 26

## 2019-01-15 PROCEDURE — 72132 CT LUMBAR SPINE W/DYE: CPT

## 2019-01-16 ENCOUNTER — RESULT REVIEW (OUTPATIENT)
Age: 59
End: 2019-01-16

## 2019-01-18 ENCOUNTER — APPOINTMENT (OUTPATIENT)
Dept: NEUROLOGY | Facility: CLINIC | Age: 59
End: 2019-01-18
Payer: COMMERCIAL

## 2019-01-18 VITALS
DIASTOLIC BLOOD PRESSURE: 68 MMHG | WEIGHT: 142 LBS | HEIGHT: 65 IN | SYSTOLIC BLOOD PRESSURE: 112 MMHG | BODY MASS INDEX: 23.66 KG/M2 | HEART RATE: 65 BPM

## 2019-01-18 PROCEDURE — 99214 OFFICE O/P EST MOD 30 MIN: CPT

## 2019-01-18 NOTE — REVIEW OF SYSTEMS
[As Noted in HPI] : as noted in HPI [Arthralgias] : arthralgias [Negative] : Heme/Lymph [de-identified] : She is complaining of loss of taste without loss of smell.   [FreeTextEntry9] : right arm pain

## 2019-01-18 NOTE — PHYSICAL EXAM
[General Appearance - Alert] : alert [General Appearance - In No Acute Distress] : in no acute distress [Oriented To Time, Place, And Person] : oriented to person, place, and time [Impaired Insight] : insight and judgment were intact [Affect] : the affect was normal [Person] : oriented to person [Place] : oriented to place [Time] : oriented to time [Short Term Intact] : short term memory intact [Remote Intact] : remote memory intact [Registration Intact] : recent registration memory intact [Span Intact] : the attention span was normal [Concentration Intact] : normal concentrating ability [Visual Intact] : visual attention was ~T not ~L decreased [Naming Objects] : no difficulty naming common objects [Repeating Phrases] : no difficulty repeating a phrase [Writing A Sentence] : no difficulty writing a sentence [Fluency] : fluency intact [Comprehension] : comprehension intact [Reading] : reading intact [Current Events] : adequate knowledge of current events [Past History] : adequate knowledge of personal past history [Vocabulary] : adequate range of vocabulary [Cranial Nerves Optic (II)] : visual acuity intact bilaterally,  visual fields full to confrontation, pupils equal round and reactive to light [Cranial Nerves Oculomotor (III)] : extraocular motion intact [Cranial Nerves Trigeminal (V)] : facial sensation intact symmetrically [Cranial Nerves Facial (VII)] : face symmetrical [Cranial Nerves Glossopharyngeal (IX)] : tongue and palate midline [Cranial Nerves Accessory (XI - Cranial And Spinal)] : head turning and shoulder shrug symmetric [Cranial Nerves Hypoglossal (XII)] : there was no tongue deviation with protrusion [Motor Tone] : muscle tone was normal in all four extremities [Motor Strength] : muscle strength was normal in all four extremities [No Muscle Atrophy] : normal bulk in all four extremities [Motor Handedness Right-Handed] : the patient is right hand dominant [Sensation Tactile Decrease] : light touch was intact [Sensation Pain / Temperature Decrease] : pain and temperature was intact [Abnormal Walk] : normal gait [Balance] : balance was intact [3+] : Patella left 3+ [2+] : Ankle jerk left 2+ [Sclera] : the sclera and conjunctiva were normal [PERRL With Normal Accommodation] : pupils were equal in size, round, reactive to light, with normal accommodation [Extraocular Movements] : extraocular movements were intact [Outer Ear] : the ears and nose were normal in appearance [Oropharynx] : the oropharynx was normal [Neck Appearance] : the appearance of the neck was normal [Neck Cervical Mass (___cm)] : no neck mass was observed [Jugular Venous Distention Increased] : there was no jugular-venous distention [Thyroid Diffuse Enlargement] : the thyroid was not enlarged [Thyroid Nodule] : there were no palpable thyroid nodules [Auscultation Breath Sounds / Voice Sounds] : lungs were clear to auscultation bilaterally [Heart Rate And Rhythm] : heart rate was normal and rhythm regular [Heart Sounds] : normal S1 and S2 [Heart Sounds Gallop] : no gallops [Murmurs] : no murmurs [Heart Sounds Pericardial Friction Rub] : no pericardial rub [Bowel Sounds] : normal bowel sounds [Nail Clubbing] : no clubbing  or cyanosis of the fingernails [] : no rash [Skin Lesions] : no skin lesions [Past-pointing] : there was no past-pointing [Tremor] : no tremor present [Plantar Reflex Right Only] : normal on the right [Plantar Reflex Left Only] : normal on the left [___] : absent on the right [___] : absent on the left [FreeTextEntry5] : hearing intact bilat.  left ear numb to PP left lobe and post auricular/post mandibular area.  [FreeTextEntry6] : mild ABP weakness, and atrophy of thenar eminence

## 2019-01-18 NOTE — DISCUSSION/SUMMARY
[Well-controlled] : well-controlled [Secondary Generalization] : secondary generalization [Idiopathic] : idiopathic  [Focal] : focal [Safety Recommendations] : The patient was advised in regards to the risk of seizures and general seizure safety recommendations including not to be bathing alone, climbing to high places and operating heavy machinery. [Medication Side Effects] : High frequency and serious potential medication adverse effects were reviewed with the patient, including but not exclusive to psychiatric effects.  Information sheets on medication side effects were made available to the patient in our clinic.  The patient or advocate agrees to notify us for any concerns. [Sleep Hygiene/Sleep Disruption Risks] : Sleep hygiene and the risks of sleep disruption were discussed. 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    \par H/o syncope, s/p PPM.\par \par L5-S1 likely causitive of left buttock pain, sciatica, and foot dysesthesias. \par cervical stenosis, f/u with ortho, max at C4-C5, some cord displacement.  some hand numbness intermittently on left.\par \par Frequent complaints of sharp pain in back of scalp.  Now some complaints of dysesthesia of the ear (numbness in the lower half of the ear/lobe, and behind mandible in neck in C2/3 distribution to pinprick).  Ear complaints may be of high superficial cervical plexus etiology (C2-C3 greater auricular n.)\par \par Intermittent facial pulling sensation complaints as of late.  Appear atypical for CVA/TIA symptoms, highly focal, recurrent, with spastic component. \par \par Will renew meds. Cont /d\par f/u baseline LTG level, target 4-6

## 2019-01-18 NOTE — DATA REVIEWED
[de-identified] : 4/2015 CTs have been normal of head, and she cannot have MRI and did not have one prior to the pacemaker.  \par 2016 CT moderate canal stenosis, cord flattening.\par 2019 CT C4-5 central left disc with compression. C5-6, C6-7 root issues.  T11-12 right disc with root compression, L5-S1 disc and root compression. [de-identified] :  EEG showed left temporal spikes in past [de-identified] : Lamictal level was 6.3 in July 2013 on 300 mg per day but dose decreased to 250 for the day due to side effects.  Previous level was 4.1 on 200 mg per day.  LTG 5.2 2016

## 2019-01-18 NOTE — HISTORY OF PRESENT ILLNESS
[FreeTextEntry1] : Updates:  \par some locking up of hands, intermittently worse left 4th 5th fingers on hand numb, no perceived loss of strength in hands or biceps.  left  buttock pain from sleep, 4-5th digits numb on left foot.\par still c/o cervicalgia, most prominently around back of scalp. some radiation of pain to left ear.  some numbness of left hand\par called by Dr Connell. pt with some symptoms reported of ear sensory disturbances felt to be atypical for cervical myelopathy. \par Had two episodes of right face feeling like muscles being pulled in the cheek area.  Improved p massage, lasting about 1min. Tightness, spasm feeling with contraction of muscle, relaxed with massage. \par \par MRI was not possible due to PPM. recommended CT head, w/ w/o contrast though it was done p myelogram with suboptimal imaging quality. \par \par no seizures for many years. \par no szs, no ADRs on /150mg low dose.\par \par On ROS, she still has neck pain.  She is sleeping OK,  and she has not intentionally lost weight.  Mood is good.  no wt change, ?night sweats\par \par PMHX: 59 yo RH F with convulsive seizures in 5/2010, 5/2008 x2, and episodes of LOC prior to this since age 44 (unclear if seizures or cardiac).  She had asystole documented in 2008 and had pacemaker placed at that time, with prior h/o afib.  Describes prior spells of flushing sensation from head to toe with fatigue a few yrs ago.\par She has had no seizures in 5 years, and is driving now.  She is working and is . \par \par She thinks that she has had about three convulsions in her lifetime and cannot count the partial ones, but has not had one in many years.  \par \par Father had seizures after head trauma, and there are no other risk factors.

## 2019-01-28 ENCOUNTER — APPOINTMENT (OUTPATIENT)
Dept: ORTHOPEDIC SURGERY | Facility: CLINIC | Age: 59
End: 2019-01-28
Payer: COMMERCIAL

## 2019-01-28 VITALS — HEIGHT: 65 IN | BODY MASS INDEX: 23.66 KG/M2 | WEIGHT: 142 LBS

## 2019-01-28 PROCEDURE — 99213 OFFICE O/P EST LOW 20 MIN: CPT

## 2019-02-12 ENCOUNTER — OUTPATIENT (OUTPATIENT)
Dept: OUTPATIENT SERVICES | Facility: HOSPITAL | Age: 59
LOS: 1 days | End: 2019-02-12
Payer: COMMERCIAL

## 2019-02-12 VITALS
RESPIRATION RATE: 16 BRPM | WEIGHT: 143.08 LBS | SYSTOLIC BLOOD PRESSURE: 128 MMHG | DIASTOLIC BLOOD PRESSURE: 75 MMHG | OXYGEN SATURATION: 99 % | HEART RATE: 72 BPM | TEMPERATURE: 97 F | HEIGHT: 65 IN

## 2019-02-12 DIAGNOSIS — M54.12 RADICULOPATHY, CERVICAL REGION: ICD-10-CM

## 2019-02-12 DIAGNOSIS — G47.33 OBSTRUCTIVE SLEEP APNEA (ADULT) (PEDIATRIC): ICD-10-CM

## 2019-02-12 DIAGNOSIS — Z29.9 ENCOUNTER FOR PROPHYLACTIC MEASURES, UNSPECIFIED: ICD-10-CM

## 2019-02-12 DIAGNOSIS — Z95.0 PRESENCE OF CARDIAC PACEMAKER: ICD-10-CM

## 2019-02-12 DIAGNOSIS — G40.909 EPILEPSY, UNSPECIFIED, NOT INTRACTABLE, WITHOUT STATUS EPILEPTICUS: ICD-10-CM

## 2019-02-12 DIAGNOSIS — M50.20 OTHER CERVICAL DISC DISPLACEMENT, UNSPECIFIED CERVICAL REGION: ICD-10-CM

## 2019-02-12 DIAGNOSIS — Z98.890 OTHER SPECIFIED POSTPROCEDURAL STATES: Chronic | ICD-10-CM

## 2019-02-12 DIAGNOSIS — Z01.818 ENCOUNTER FOR OTHER PREPROCEDURAL EXAMINATION: ICD-10-CM

## 2019-02-12 LAB
ANION GAP SERPL CALC-SCNC: 12 MMOL/L — SIGNIFICANT CHANGE UP (ref 5–17)
BLD GP AB SCN SERPL QL: NEGATIVE — SIGNIFICANT CHANGE UP
BUN SERPL-MCNC: 14 MG/DL — SIGNIFICANT CHANGE UP (ref 7–23)
CALCIUM SERPL-MCNC: 9.7 MG/DL — SIGNIFICANT CHANGE UP (ref 8.4–10.5)
CHLORIDE SERPL-SCNC: 104 MMOL/L — SIGNIFICANT CHANGE UP (ref 96–108)
CO2 SERPL-SCNC: 26 MMOL/L — SIGNIFICANT CHANGE UP (ref 22–31)
CREAT SERPL-MCNC: 0.72 MG/DL — SIGNIFICANT CHANGE UP (ref 0.5–1.3)
GLUCOSE SERPL-MCNC: 74 MG/DL — SIGNIFICANT CHANGE UP (ref 70–99)
HCT VFR BLD CALC: 37.6 % — SIGNIFICANT CHANGE UP (ref 34.5–45)
HGB BLD-MCNC: 11.6 G/DL — SIGNIFICANT CHANGE UP (ref 11.5–15.5)
MCHC RBC-ENTMCNC: 28.8 PG — SIGNIFICANT CHANGE UP (ref 27–34)
MCHC RBC-ENTMCNC: 30.9 GM/DL — LOW (ref 32–36)
MCV RBC AUTO: 93.3 FL — SIGNIFICANT CHANGE UP (ref 80–100)
MRSA PCR RESULT.: SIGNIFICANT CHANGE UP
PLATELET # BLD AUTO: 271 K/UL — SIGNIFICANT CHANGE UP (ref 150–400)
POTASSIUM SERPL-MCNC: 4.2 MMOL/L — SIGNIFICANT CHANGE UP (ref 3.5–5.3)
POTASSIUM SERPL-SCNC: 4.2 MMOL/L — SIGNIFICANT CHANGE UP (ref 3.5–5.3)
RBC # BLD: 4.03 M/UL — SIGNIFICANT CHANGE UP (ref 3.8–5.2)
RBC # FLD: 13.1 % — SIGNIFICANT CHANGE UP (ref 10.3–14.5)
RH IG SCN BLD-IMP: POSITIVE — SIGNIFICANT CHANGE UP
S AUREUS DNA NOSE QL NAA+PROBE: SIGNIFICANT CHANGE UP
SODIUM SERPL-SCNC: 142 MMOL/L — SIGNIFICANT CHANGE UP (ref 135–145)
WBC # BLD: 3.9 K/UL — SIGNIFICANT CHANGE UP (ref 3.8–10.5)
WBC # FLD AUTO: 3.9 K/UL — SIGNIFICANT CHANGE UP (ref 3.8–10.5)

## 2019-02-12 PROCEDURE — 86900 BLOOD TYPING SEROLOGIC ABO: CPT

## 2019-02-12 PROCEDURE — 86850 RBC ANTIBODY SCREEN: CPT

## 2019-02-12 PROCEDURE — 85027 COMPLETE CBC AUTOMATED: CPT

## 2019-02-12 PROCEDURE — 80048 BASIC METABOLIC PNL TOTAL CA: CPT

## 2019-02-12 PROCEDURE — 87641 MR-STAPH DNA AMP PROBE: CPT

## 2019-02-12 PROCEDURE — 87640 STAPH A DNA AMP PROBE: CPT

## 2019-02-12 PROCEDURE — 86901 BLOOD TYPING SEROLOGIC RH(D): CPT

## 2019-02-12 PROCEDURE — G0463: CPT

## 2019-02-12 RX ORDER — SODIUM CHLORIDE 9 MG/ML
3 INJECTION INTRAMUSCULAR; INTRAVENOUS; SUBCUTANEOUS EVERY 8 HOURS
Qty: 0 | Refills: 0 | Status: DISCONTINUED | OUTPATIENT
Start: 2019-02-19 | End: 2019-02-19

## 2019-02-12 RX ORDER — LIDOCAINE HCL 20 MG/ML
0.2 VIAL (ML) INJECTION ONCE
Qty: 0 | Refills: 0 | Status: DISCONTINUED | OUTPATIENT
Start: 2019-02-19 | End: 2019-02-19

## 2019-02-12 NOTE — H&P PST ADULT - PMH
Endometriosis, Mild    EP (Epilepsy)  - seizure x 2 - 2008, and in 2010    h/o - left Tennis Elbow    Hx of sleep apnea - had sleep study done in ? 2005 - does not use CPAP machine    Pacemaker - insertion in 2004 Bradycardia    Cervical disc displacement    Endometriosis, Mild    EP (Epilepsy)  - seizure x 2 - 2008, and in 2010    h/o - left Tennis Elbow    Hx of sleep apnea - had sleep study done in ? 2005 - does not use CPAP machine  uses oral appliance  Pacemaker - insertion in 2004    Radiculopathy of cervical region    Sick sinus syndrome

## 2019-02-12 NOTE — H&P PST ADULT - ASSESSMENT
MARTAI VTE 2.0 SCORE [CLOT updated 2019]    AGE RELATED RISK FACTORS                                                       MOBILITY RELATED FACTORS  [x ] Age 41-60 years                                            (1 Point)                    [ ] Bed rest                                                        (1 Point)  [ ] Age: 61-74 years                                           (2 Points)                  [ ] Plaster cast                                                   (2 Points)  [ ] Age= 75 years                                              (3 Points)                    [ ] Bed bound for more than 72 hours                 (2 Points)    DISEASE RELATED RISK FACTORS                                               GENDER SPECIFIC FACTORS  [ ] Edema in the lower extremities                       (1 Point)              [ ] Pregnancy                                                     (1 Point)  [ ] Varicose veins                                               (1 Point)                     [ ] Post-partum < 6 weeks                                   (1 Point)             [ ] BMI > 25 Kg/m2                                            (1 Point)                     [ ] Hormonal therapy  or oral contraception          (1 Point)                 [ ] Sepsis (in the previous month)                        (1 Point)               [ ] History of pregnancy complications                 (1 point)  [ ] Pneumonia or serious lung disease                                               [ ] Unexplained or recurrent                     (1 Point)           (in the previous month)                               (1 Point)  [ ] Abnormal pulmonary function test                     (1 Point)                 SURGERY RELATED RISK FACTORS  [ ] Acute myocardial infarction                              (1 Point)               [ ]  Section                                             (1 Point)  [ ] Congestive heart failure (in the previous month)  (1 Point)      [ ] Minor surgery                                                  (1 Point)   [ ] Inflammatory bowel disease                             (1 Point)               [ ] Arthroscopic surgery                                        (2 Points)  [ ] Central venous access                                      (2 Points)                [x ] General surgery lasting more than 45 minutes (2 points)  [ ] Malignancy- Present or previous                   (2 Points)                [ ] Elective arthroplasty                                         (5 points)    [ ] Stroke (in the previous month)                          (5 Points)                                                                                                                                                           HEMATOLOGY RELATED FACTORS                                                 TRAUMA RELATED RISK FACTORS  [ ] Prior episodes of VTE                                     (3 Points)                [ ] Fracture of the hip, pelvis, or leg                       (5 Points)  [ ] Positive family history for VTE                         (3 Points)             [ ] Acute spinal cord injury (in the previous month)  (5 Points)  [ ] Prothrombin 57048 A                                     (3 Points)               [ ] Paralysis  (less than 1 month)                             (5 Points)  [ ] Factor V Leiden                                             (3 Points)                  [ ] Multiple Trauma within 1 month                        (5 Points)  [ ] Lupus anticoagulants                                     (3 Points)                                                           [ ] Anticardiolipin antibodies                               (3 Points)                                                       [ ] High homocysteine in the blood                      (3 Points)                                             [ ] Other congenital or acquired thrombophilia      (3 Points)                                                [ ] Heparin induced thrombocytopenia                  (3 Points)                                     Total Score [     3     ]

## 2019-02-12 NOTE — H&P PST ADULT - HISTORY OF PRESENT ILLNESS
57 Y/O Female H/O Seizures (last episode 2007), REJI Mild, Pacemaker 2004 (Bradycardia, fainted). C/O Neck pain radiating down bilateral arms for about 20 years, numbness left pinky and ring fingers, left leg pain and numbness. Seen MD. S/P Cat scan, EMG. Presents for surgery

## 2019-02-12 NOTE — H&P PST ADULT - ATTENDING COMMENTS
the patient has spinal cord compression with myelopathy and radiculopathy - the nature of this problem, the options available, the risks and possible complications of the various options of treatment including those of no treatment at all were discussed at length with the patient. The specific risks and possible complications of the planned surgery (anterior cervical decompression and fusion)  including but not limited to death, paralysis, nerve damage, infection, bleeding,  failure of the fusion, failure of the hardware, vocal cord paralysis, swallowing difficulties,  the possible need for further surgery in the future (posterior procedure, repair pseudoarthrosis) pulmonary and/or cardiac complications DVT, PE UTI etc etc were discussed at length with the patient who understands and wishes to proceed.    I discussed specifically with her that we are trying to address the most significant problem in her spine that we believe she has. Other spinal surgeries may be needed (posterior procedure, surgery at other levels) in the future if some of her symptoms persist. Some of her symptoms, especially around her ear and face, may not be spine related and may not be addressed with this or any other spinal surgery.

## 2019-02-12 NOTE — H&P PST ADULT - PSH
h/o cardiac pacemaker insertion - 2004  battery replaced 2017, Scaled Inference Model L331/ serial [id number]  H/O colonoscopy    History of D&C  2012  History of D&C - 15 yrs ago    History of laparoscopy - many yrs ago

## 2019-02-13 ENCOUNTER — APPOINTMENT (OUTPATIENT)
Dept: CARDIOLOGY | Facility: CLINIC | Age: 59
End: 2019-02-13
Payer: COMMERCIAL

## 2019-02-13 ENCOUNTER — NON-APPOINTMENT (OUTPATIENT)
Age: 59
End: 2019-02-13

## 2019-02-13 VITALS
WEIGHT: 147 LBS | BODY MASS INDEX: 24.49 KG/M2 | DIASTOLIC BLOOD PRESSURE: 67 MMHG | OXYGEN SATURATION: 96 % | HEART RATE: 69 BPM | SYSTOLIC BLOOD PRESSURE: 105 MMHG | HEIGHT: 65 IN

## 2019-02-13 PROCEDURE — 93000 ELECTROCARDIOGRAM COMPLETE: CPT

## 2019-02-13 PROCEDURE — 99214 OFFICE O/P EST MOD 30 MIN: CPT

## 2019-02-13 NOTE — REVIEW OF SYSTEMS
[Negative] : Heme/Lymph [Shortness Of Breath] : no shortness of breath [Dyspnea on exertion] : not dyspnea during exertion [Chest  Pressure] : no chest pressure [Chest Pain] : no chest pain [Lower Ext Edema] : no extremity edema [Leg Claudication] : no intermittent leg claudication [Palpitations] : no palpitations [Cough] : no cough [Wheezing] : no wheezing [Coughing Up Blood] : no hemoptysis [Abdominal Pain] : no abdominal pain [Nausea] : no nausea [Heartburn] : no heartburn [Change in Appetite] : no change in appetite [Dysphagia] : no dysphagia

## 2019-02-13 NOTE — HISTORY OF PRESENT ILLNESS
[FreeTextEntry1] : Gricelda presents for evaluation of cv risk. She is now scheduled for C4/5 fusion by Dr. Connell and Dr Claudio on February 19 J. She has been experiencing neck pain, left arm numbness, and occasional left lower extremity paresthesias. She is otherwise feeling well and reports no chest pain, dyspnea, or palpitations\par \par In March of 2017 she had a new [place] Spyra generator replaced\par \par Past medical history is remarkable for her being perimenopausal, history of obstructive sleep apnea, history of seizures, history of cervical spinal stenosis, and history of syncope at age 44 with documented sinus arrest leading to permanent pacemaker implantation (Guidant insignia dual-chamber). Of note, on routine pacemaker interrogation in the past, she has been noted to have very brief burst of atrial fibrillation and rare nonsustained ventricular tachycardia.\par \par Social history is negative for alcohol and tobacco use. Her daughter Edel is working as a guidance counselor at  [employer].

## 2019-02-13 NOTE — REASON FOR VISIT
[Chest Pain] : chest pain [Pacemaker Evaluation] : pacemaker ~T evaluation ~C was performed [Palpitations] : palpitations

## 2019-02-13 NOTE — PHYSICAL EXAM
[General Appearance - Well Developed] : well developed [Normal Appearance] : normal appearance [Well Groomed] : well groomed [General Appearance - Well Nourished] : well nourished [No Deformities] : no deformities [General Appearance - In No Acute Distress] : no acute distress [Normal Conjunctiva] : the conjunctiva exhibited no abnormalities [Eyelids - No Xanthelasma] : the eyelids demonstrated no xanthelasmas [Normal Oral Mucosa] : normal oral mucosa [No Oral Pallor] : no oral pallor [No Oral Cyanosis] : no oral cyanosis [Normal Jugular Venous A Waves Present] : normal jugular venous A waves present [Normal Jugular Venous V Waves Present] : normal jugular venous V waves present [No Jugular Venous Rashid A Waves] : no jugular venous rashid A waves [Respiration, Rhythm And Depth] : normal respiratory rhythm and effort [Exaggerated Use Of Accessory Muscles For Inspiration] : no accessory muscle use [Auscultation Breath Sounds / Voice Sounds] : lungs were clear to auscultation bilaterally [Abdomen Soft] : soft [Abdomen Tenderness] : non-tender [Abdomen Mass (___ Cm)] : no abdominal mass palpated [Abnormal Walk] : normal gait [Gait - Sufficient For Exercise Testing] : the gait was sufficient for exercise testing [Nail Clubbing] : no clubbing of the fingernails [Cyanosis, Localized] : no localized cyanosis [Petechial Hemorrhages (___cm)] : no petechial hemorrhages [Skin Color & Pigmentation] : normal skin color and pigmentation [] : no rash [No Venous Stasis] : no venous stasis [Skin Lesions] : no skin lesions [No Skin Ulcers] : no skin ulcer [No Xanthoma] : no  xanthoma was observed [Oriented To Time, Place, And Person] : oriented to person, place, and time [Affect] : the affect was normal [Mood] : the mood was normal [No Anxiety] : not feeling anxious [5th Left ICS - MCL] : palpated at the 5th LICS in the midclavicular line [Normal] : normal [No Precordial Heave] : no precordial heave was noted [Normal Rate] : normal [Heart Rate ___] : [unfilled] bpm [Rhythm Regular] : regular [Normal S1] : normal S1 [Normal S2] : normal S2 [No Gallop] : no gallop heard [No Murmur] : no murmurs heard [2+] : left 2+ [No Abnormalities] : the abdominal aorta was not enlarged and no bruit was heard [No Pitting Edema] : no pitting edema present [Apical Thrill] : no thrill palpable at the apex [S3] : no S3 [Click] : no click [Pericardial Rub] : no pericardial rub [Right Carotid Bruit] : no bruit heard over the right carotid [Left Carotid Bruit] : no bruit heard over the left carotid [Right Femoral Bruit] : no bruit heard over the right femoral artery [Left Femoral Bruit] : no bruit heard over the left femoral artery [Bruit] : no bruit heard [Rt] : no varicose veins of the right leg [Lt] : no varicose veins of the left leg

## 2019-02-13 NOTE — DISCUSSION/SUMMARY
[FreeTextEntry1] :  Gricelda is doing well and has no contraindications to her planned surgery this month. She is in optimal cardiovascular condition and I would proceed with her surgery using routine hemodynamic monitoring. Her pacemaker was evaluated in December of 2018 with excellent parameters. Echocardiography last year was unremarkable. No other evaluation is necessary at this time and we will be available if any questions arise. Please do not hesitate to call

## 2019-02-13 NOTE — CARDIOLOGY SUMMARY
[___] : [unfilled] [No Ischemia] : no Ischemia [LVEF ___%] : LVEF [unfilled]% [Normal] : normal LA size [None] : no mitral regurgitation [___] : [unfilled]

## 2019-02-14 ENCOUNTER — APPOINTMENT (OUTPATIENT)
Dept: NEUROSURGERY | Facility: CLINIC | Age: 59
End: 2019-02-14
Payer: COMMERCIAL

## 2019-02-14 VITALS
SYSTOLIC BLOOD PRESSURE: 132 MMHG | HEIGHT: 65 IN | TEMPERATURE: 98.3 F | WEIGHT: 147 LBS | BODY MASS INDEX: 24.49 KG/M2 | HEART RATE: 74 BPM | OXYGEN SATURATION: 96 % | RESPIRATION RATE: 16 BRPM | DIASTOLIC BLOOD PRESSURE: 81 MMHG

## 2019-02-14 PROCEDURE — 99215 OFFICE O/P EST HI 40 MIN: CPT | Mod: 57

## 2019-02-14 NOTE — ASSESSMENT
[FreeTextEntry1] : CT-Myelogram review: \par \par Cervical myelogram and CT reviewed with Dr. Villegas.\par Head: Dye in cranial cisterns. No obvious abnormality seen.\par Cervical:  C4-5 moderately large central HNP. C5-6 moderate left foraminal narrowing.C6-7 normal. C7-T1 possible left foraminal HNP.\par \par IMPRESSION:\par \par 1. Cervical myelopathy probably secondary to long-standing cervical disc herniation at C4-5. Other causes of the myelopathy, including diffuse white matter hypoxic injury cannot be ruled out, especially given the patient's history of cardiac arrest.\par 2. Possible left cervical radiculopathy - could be related to the moderate left C5-6 foraminal stenosis.\par 3. Suspicious for a left C7-T1 foraminal disc herniation on myelo-CT - would correlate with her symptoms of left little and ring finger numbness.\par 4. Chronic neck pain - ?arthritic\par 5. Seizure disorder - controlled on medications\par \par PLAN:\par 1.  Explained to patient that after surgery the multiple symptoms that she is experiencing may not disappear.\par 2.  Discussed risks related of surgery including but not limited to voice hoarseness (may be permanent), difficulty swallowing, weakness of the shoulders, stroke, bleeding, infection, and even death among others.She may need additional surgery in the future. Will discuss with Dr. Connell. Patient wishes to proceed with surgery on 2/19/19.\par \par Jace Marx MD, FACS, FAANS\par Professor and \par Department of Neurosurgery\par [facility]\par [address], [address]\[place] [address]\par [phone] Clinical\par [phone] Academic\par

## 2019-02-14 NOTE — REASON FOR VISIT
[Follow-Up: _____] : a [unfilled] follow-up visit [FreeTextEntry1] : Gricelda Garcia is a pleasant right handed 58 year old  lady who presents for evaluation prior to planned C-spine surgery on 2/19/19 by Dr. Ko Hooper and Dr. Marx. She has neck pain radiating down mostly the left arm and intermittent numbness in the left ring finger and little finger. She also describes "a lot of pressure" in the back of her neck with limited mobility when trying to extend her neck. When I last saw her in July 2018, I found her to have a myelopathy, likely from a C4-5 central disc, and recommended surgery. She has postponed that for personal reasons.\par \par She has been seen by  neurologist Dr. Rell Gr  for facial pulling and left ear numbness.   She is currently taking Lamictal and reports her last seizure was in [id number]. She has only had 2 seizures - both at night.\par \par She has a past medical history significant for her being perimenopausal, history of obstructive sleep apnea, seizures, cervical spinal stenosis, syncope at age 44 with documented sinus arrest leading to PPM (Guidant Insignia dual-chamber). Of note, on routine pacemaker interrogation in the past, she has been noted to have very brief bursts of atrial fibrillation and rare nonsustained ventricular tachycardia.

## 2019-02-14 NOTE — PHYSICAL EXAM
[General Appearance - Alert] : alert [General Appearance - In No Acute Distress] : in no acute distress [General Appearance - Well Nourished] : well nourished [General Appearance - Well Developed] : well developed [General Appearance - Well-Appearing] : healthy appearing [Oriented To Time, Place, And Person] : oriented to person, place, and time [Impaired Insight] : insight and judgment were intact [Affect] : the affect was normal [Memory Recent] : recent memory was not impaired [Person] : oriented to person [Place] : oriented to place [Time] : oriented to time [Cranial Nerves Optic (II)] : visual acuity intact bilaterally,  pupils equal round and reactive to light [Cranial Nerves Oculomotor (III)] : extraocular motion intact [Cranial Nerves Trigeminal (V)] : facial sensation intact symmetrically [Cranial Nerves Facial (VII)] : face symmetrical [Cranial Nerves Vestibulocochlear (VIII)] : hearing was intact bilaterally [Cranial Nerves Glossopharyngeal (IX)] : tongue and palate midline [Cranial Nerves Accessory (XI - Cranial And Spinal)] : head turning and shoulder shrug symmetric [Cranial Nerves Hypoglossal (XII)] : there was no tongue deviation with protrusion [Motor Strength] : muscle strength was normal in all four extremities [Motor Handedness Right-Handed] : the patient is right hand dominant [Balance] : balance was intact [Sclera] : the sclera and conjunctiva were normal [PERRL With Normal Accommodation] : pupils were equal in size, round, reactive to light, with normal accommodation [Extraocular Movements] : extraocular movements were intact [Outer Ear] : the ears and nose were normal in appearance [Hearing Threshold Finger Rub Not [place]] : hearing was normal [Oropharynx] : the oropharynx was normal [Neck Appearance] : the appearance of the neck was normal [Respiration, Rhythm And Depth] : normal respiratory rhythm and effort [Exaggerated Use Of Accessory Muscles For Inspiration] : no accessory muscle use [Heart Rate And Rhythm] : heart rate was normal and rhythm regular [Abnormal Walk] : normal gait [Involuntary Movements] : no involuntary movements were seen [Motor Tone] : muscle strength and tone were normal [Skin Color & Pigmentation] : normal skin color and pigmentation [] : no rash [5] : C8 finger flexors 5/5 [3+] : Brachioradialis left 3+ [4+] : Patella left 4+ [2+] : Ankle jerk left 2+ [Burnette] : Burnette's sign was demonstrated [No Visual Abnormalities] : no visible abnormalities [FreeTextEntry7] : normal sensation to all extremities and both sides of face  [FreeTextEntry9] : Pectoralis reflex 3/2 on the right.  Pacemaker on the left.

## 2019-02-14 NOTE — PHYSICAL EXAM
[General Appearance - Alert] : alert [General Appearance - In No Acute Distress] : in no acute distress [General Appearance - Well Nourished] : well nourished [General Appearance - Well Developed] : well developed [General Appearance - Well-Appearing] : healthy appearing [Oriented To Time, Place, And Person] : oriented to person, place, and time [Impaired Insight] : insight and judgment were intact [Affect] : the affect was normal [Memory Recent] : recent memory was not impaired [Person] : oriented to person [Place] : oriented to place [Time] : oriented to time [Cranial Nerves Optic (II)] : visual acuity intact bilaterally,  pupils equal round and reactive to light [Cranial Nerves Oculomotor (III)] : extraocular motion intact [Cranial Nerves Trigeminal (V)] : facial sensation intact symmetrically [Cranial Nerves Facial (VII)] : face symmetrical [Cranial Nerves Vestibulocochlear (VIII)] : hearing was intact bilaterally [Cranial Nerves Glossopharyngeal (IX)] : tongue and palate midline [Cranial Nerves Accessory (XI - Cranial And Spinal)] : head turning and shoulder shrug symmetric [Cranial Nerves Hypoglossal (XII)] : there was no tongue deviation with protrusion [Motor Strength] : muscle strength was normal in all four extremities [Motor Handedness Right-Handed] : the patient is right hand dominant [Balance] : balance was intact [Sclera] : the sclera and conjunctiva were normal [PERRL With Normal Accommodation] : pupils were equal in size, round, reactive to light, with normal accommodation [Extraocular Movements] : extraocular movements were intact [Outer Ear] : the ears and nose were normal in appearance [Hearing Threshold Finger Rub Not [place]] : hearing was normal [Oropharynx] : the oropharynx was normal [Neck Appearance] : the appearance of the neck was normal [Respiration, Rhythm And Depth] : normal respiratory rhythm and effort [Exaggerated Use Of Accessory Muscles For Inspiration] : no accessory muscle use [Heart Rate And Rhythm] : heart rate was normal and rhythm regular [Abnormal Walk] : normal gait [Involuntary Movements] : no involuntary movements were seen [Motor Tone] : muscle strength and tone were normal [Skin Color & Pigmentation] : normal skin color and pigmentation [] : no rash [5] : C8 finger flexors 5/5 [3+] : Brachioradialis left 3+ [4+] : Patella left 4+ [2+] : Ankle jerk left 2+ [Burnette] : Burnette's sign was demonstrated [No Visual Abnormalities] : no visible abnormalities [FreeTextEntry7] : normal sensation to all extremities and both sides of face  [FreeTextEntry9] : Pectoralis reflex 3/2 on the right.  Pacemaker on the left.
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
## 2019-02-14 NOTE — ASSESSMENT
[FreeTextEntry1] : CT-Myelogram review: \par \par Cervical myelogram and CT reviewed with Dr. Villegas.\par Head: Dye in cranial cisterns. No obvious abnormality seen.\par Cervical:  C4-5 moderately large central HNP. C5-6 moderate left foraminal narrowing.C6-7 normal. C7-T1 possible left foraminal HNP.\par \par IMPRESSION:\par \par 1. Cervical myelopathy probably secondary to long-standing cervical disc herniation at C4-5. Other causes of the myelopathy, including diffuse white matter hypoxic injury cannot be ruled out, especially given the patient's history of cardiac arrest.\par 2. Possible left cervical radiculopathy - could be related to the moderate left C5-6 foraminal stenosis.\par 3. Suspicious for a left C7-T1 foraminal disc herniation on myelo-CT - would correlate with her symptoms of left little and ring finger numbness.\par 4. Chronic neck pain - ?arthritic\par 5. Seizure disorder - controlled on medications\par \par PLAN:\par 1.  Explained to patient that after surgery the multiple symptoms that she is experiencing may not disappear.\par 2.  Discussed risks related of surgery including but not limited to voice hoarseness (may be permanent), difficulty swallowing, weakness of the shoulders, stroke, bleeding, infection, and even death among others.She may need additional surgery in the future. Will discuss with Dr. Connell. Patient wishes to proceed with surgery on 2/19/19.\par \par Jace Marx MD, FACS, FAANS\par Professor and \par Department of Neurosurgery\par [facility]\par [address], [address]\[place] [address]\par [phone] Clinical\par [phone] Academic\par

## 2019-02-15 ENCOUNTER — CHART COPY (OUTPATIENT)
Age: 59
End: 2019-02-15

## 2019-02-18 ENCOUNTER — TRANSCRIPTION ENCOUNTER (OUTPATIENT)
Age: 59
End: 2019-02-18

## 2019-02-19 ENCOUNTER — APPOINTMENT (OUTPATIENT)
Dept: NEUROSURGERY | Facility: CLINIC | Age: 59
End: 2019-02-19

## 2019-02-19 ENCOUNTER — RESULT REVIEW (OUTPATIENT)
Age: 59
End: 2019-02-19

## 2019-02-19 ENCOUNTER — INPATIENT (INPATIENT)
Facility: HOSPITAL | Age: 59
LOS: 0 days | Discharge: HOME | DRG: 473 | End: 2019-02-20
Attending: ORTHOPAEDIC SURGERY | Admitting: ORTHOPAEDIC SURGERY
Payer: COMMERCIAL

## 2019-02-19 ENCOUNTER — APPOINTMENT (OUTPATIENT)
Dept: ORTHOPEDIC SURGERY | Facility: HOSPITAL | Age: 59
End: 2019-02-19

## 2019-02-19 VITALS
HEART RATE: 65 BPM | OXYGEN SATURATION: 99 % | DIASTOLIC BLOOD PRESSURE: 68 MMHG | RESPIRATION RATE: 16 BRPM | HEIGHT: 65 IN | SYSTOLIC BLOOD PRESSURE: 120 MMHG | TEMPERATURE: 98 F | WEIGHT: 143.08 LBS

## 2019-02-19 DIAGNOSIS — M54.12 RADICULOPATHY, CERVICAL REGION: ICD-10-CM

## 2019-02-19 DIAGNOSIS — M50.20 OTHER CERVICAL DISC DISPLACEMENT, UNSPECIFIED CERVICAL REGION: ICD-10-CM

## 2019-02-19 DIAGNOSIS — Z98.890 OTHER SPECIFIED POSTPROCEDURAL STATES: Chronic | ICD-10-CM

## 2019-02-19 LAB
ANION GAP SERPL CALC-SCNC: 13 MMOL/L — SIGNIFICANT CHANGE UP (ref 5–17)
BASOPHILS # BLD AUTO: 0 K/UL — SIGNIFICANT CHANGE UP (ref 0–0.2)
BASOPHILS NFR BLD AUTO: 0.3 % — SIGNIFICANT CHANGE UP (ref 0–2)
BUN SERPL-MCNC: 15 MG/DL — SIGNIFICANT CHANGE UP (ref 7–23)
CALCIUM SERPL-MCNC: 9.1 MG/DL — SIGNIFICANT CHANGE UP (ref 8.4–10.5)
CHLORIDE SERPL-SCNC: 108 MMOL/L — SIGNIFICANT CHANGE UP (ref 96–108)
CO2 SERPL-SCNC: 22 MMOL/L — SIGNIFICANT CHANGE UP (ref 22–31)
CREAT SERPL-MCNC: 0.73 MG/DL — SIGNIFICANT CHANGE UP (ref 0.5–1.3)
EOSINOPHIL # BLD AUTO: 0 K/UL — SIGNIFICANT CHANGE UP (ref 0–0.5)
EOSINOPHIL NFR BLD AUTO: 0.2 % — SIGNIFICANT CHANGE UP (ref 0–6)
GLUCOSE SERPL-MCNC: 126 MG/DL — HIGH (ref 70–99)
HCT VFR BLD CALC: 33 % — LOW (ref 34.5–45)
HGB BLD-MCNC: 11.4 G/DL — LOW (ref 11.5–15.5)
LYMPHOCYTES # BLD AUTO: 0.6 K/UL — LOW (ref 1–3.3)
LYMPHOCYTES # BLD AUTO: 8.4 % — LOW (ref 13–44)
MCHC RBC-ENTMCNC: 31.4 PG — SIGNIFICANT CHANGE UP (ref 27–34)
MCHC RBC-ENTMCNC: 34.6 GM/DL — SIGNIFICANT CHANGE UP (ref 32–36)
MCV RBC AUTO: 90.7 FL — SIGNIFICANT CHANGE UP (ref 80–100)
MONOCYTES # BLD AUTO: 0.1 K/UL — SIGNIFICANT CHANGE UP (ref 0–0.9)
MONOCYTES NFR BLD AUTO: 1.3 % — LOW (ref 2–14)
NEUTROPHILS # BLD AUTO: 5.9 K/UL — SIGNIFICANT CHANGE UP (ref 1.8–7.4)
NEUTROPHILS NFR BLD AUTO: 89.7 % — HIGH (ref 43–77)
PLATELET # BLD AUTO: 203 K/UL — SIGNIFICANT CHANGE UP (ref 150–400)
POTASSIUM SERPL-MCNC: 4 MMOL/L — SIGNIFICANT CHANGE UP (ref 3.5–5.3)
POTASSIUM SERPL-SCNC: 4 MMOL/L — SIGNIFICANT CHANGE UP (ref 3.5–5.3)
RBC # BLD: 3.64 M/UL — LOW (ref 3.8–5.2)
RBC # FLD: 12.3 % — SIGNIFICANT CHANGE UP (ref 10.3–14.5)
RH IG SCN BLD-IMP: POSITIVE — SIGNIFICANT CHANGE UP
SODIUM SERPL-SCNC: 143 MMOL/L — SIGNIFICANT CHANGE UP (ref 135–145)
WBC # BLD: 6.5 K/UL — SIGNIFICANT CHANGE UP (ref 3.8–10.5)
WBC # FLD AUTO: 6.5 K/UL — SIGNIFICANT CHANGE UP (ref 3.8–10.5)

## 2019-02-19 PROCEDURE — 22845 INSERT SPINE FIXATION DEVICE: CPT | Mod: 59

## 2019-02-19 PROCEDURE — 88304 TISSUE EXAM BY PATHOLOGIST: CPT | Mod: 26

## 2019-02-19 PROCEDURE — 22551 ARTHRD ANT NTRBDY CERVICAL: CPT | Mod: 62

## 2019-02-19 PROCEDURE — 22853 INSJ BIOMECHANICAL DEVICE: CPT

## 2019-02-19 PROCEDURE — 22845 INSERT SPINE FIXATION DEVICE: CPT | Mod: 80,59

## 2019-02-19 PROCEDURE — 22853 INSJ BIOMECHANICAL DEVICE: CPT | Mod: 80

## 2019-02-19 PROCEDURE — 72020 X-RAY EXAM OF SPINE 1 VIEW: CPT | Mod: 26

## 2019-02-19 RX ORDER — DEXAMETHASONE 0.5 MG/5ML
4 ELIXIR ORAL EVERY 6 HOURS
Qty: 0 | Refills: 0 | Status: COMPLETED | OUTPATIENT
Start: 2019-02-19 | End: 2019-02-20

## 2019-02-19 RX ORDER — DEXAMETHASONE 0.5 MG/5ML
ELIXIR ORAL
Qty: 0 | Refills: 0 | Status: CANCELLED | OUTPATIENT
Start: 2019-02-20 | End: 2019-02-20

## 2019-02-19 RX ORDER — BENZOCAINE AND MENTHOL 5; 1 G/100ML; G/100ML
1 LIQUID ORAL EVERY 4 HOURS
Qty: 0 | Refills: 0 | Status: DISCONTINUED | OUTPATIENT
Start: 2019-02-19 | End: 2019-02-20

## 2019-02-19 RX ORDER — SENNA PLUS 8.6 MG/1
2 TABLET ORAL AT BEDTIME
Qty: 0 | Refills: 0 | Status: DISCONTINUED | OUTPATIENT
Start: 2019-02-19 | End: 2019-02-20

## 2019-02-19 RX ORDER — HYDROMORPHONE HYDROCHLORIDE 2 MG/ML
0.5 INJECTION INTRAMUSCULAR; INTRAVENOUS; SUBCUTANEOUS
Qty: 0 | Refills: 0 | Status: DISCONTINUED | OUTPATIENT
Start: 2019-02-19 | End: 2019-02-19

## 2019-02-19 RX ORDER — SODIUM CHLORIDE 9 MG/ML
1000 INJECTION, SOLUTION INTRAVENOUS
Qty: 0 | Refills: 0 | Status: DISCONTINUED | OUTPATIENT
Start: 2019-02-19 | End: 2019-02-19

## 2019-02-19 RX ORDER — DIPHENHYDRAMINE HCL 50 MG
12.5 CAPSULE ORAL EVERY 4 HOURS
Qty: 0 | Refills: 0 | Status: DISCONTINUED | OUTPATIENT
Start: 2019-02-19 | End: 2019-02-20

## 2019-02-19 RX ORDER — DIAZEPAM 5 MG
5 TABLET ORAL EVERY 8 HOURS
Qty: 0 | Refills: 0 | Status: DISCONTINUED | OUTPATIENT
Start: 2019-02-19 | End: 2019-02-20

## 2019-02-19 RX ORDER — DEXAMETHASONE 0.5 MG/5ML
3 ELIXIR ORAL EVERY 6 HOURS
Qty: 0 | Refills: 0 | Status: DISCONTINUED | OUTPATIENT
Start: 2019-02-20 | End: 2019-02-20

## 2019-02-19 RX ORDER — DOCUSATE SODIUM 100 MG
100 CAPSULE ORAL THREE TIMES A DAY
Qty: 0 | Refills: 0 | Status: DISCONTINUED | OUTPATIENT
Start: 2019-02-19 | End: 2019-02-20

## 2019-02-19 RX ORDER — MAGNESIUM HYDROXIDE 400 MG/1
30 TABLET, CHEWABLE ORAL EVERY 12 HOURS
Qty: 0 | Refills: 0 | Status: DISCONTINUED | OUTPATIENT
Start: 2019-02-19 | End: 2019-02-20

## 2019-02-19 RX ORDER — TRAMADOL HYDROCHLORIDE 50 MG/1
50 TABLET ORAL EVERY 4 HOURS
Qty: 0 | Refills: 0 | Status: DISCONTINUED | OUTPATIENT
Start: 2019-02-19 | End: 2019-02-20

## 2019-02-19 RX ORDER — HYDROMORPHONE HYDROCHLORIDE 2 MG/ML
0.5 INJECTION INTRAMUSCULAR; INTRAVENOUS; SUBCUTANEOUS
Qty: 0 | Refills: 0 | Status: DISCONTINUED | OUTPATIENT
Start: 2019-02-19 | End: 2019-02-20

## 2019-02-19 RX ORDER — ACETAMINOPHEN 500 MG
1000 TABLET ORAL ONCE
Qty: 0 | Refills: 0 | Status: DISCONTINUED | OUTPATIENT
Start: 2019-02-19 | End: 2019-02-20

## 2019-02-19 RX ORDER — BENZOCAINE AND MENTHOL 5; 1 G/100ML; G/100ML
1 LIQUID ORAL ONCE
Qty: 0 | Refills: 0 | Status: COMPLETED | OUTPATIENT
Start: 2019-02-19 | End: 2019-02-19

## 2019-02-19 RX ORDER — ONDANSETRON 8 MG/1
4 TABLET, FILM COATED ORAL EVERY 6 HOURS
Qty: 0 | Refills: 0 | Status: DISCONTINUED | OUTPATIENT
Start: 2019-02-19 | End: 2019-02-20

## 2019-02-19 RX ORDER — LANOLIN ALCOHOL/MO/W.PET/CERES
5 CREAM (GRAM) TOPICAL AT BEDTIME
Qty: 0 | Refills: 0 | Status: DISCONTINUED | OUTPATIENT
Start: 2019-02-19 | End: 2019-02-20

## 2019-02-19 RX ORDER — OXYCODONE HYDROCHLORIDE 5 MG/1
10 TABLET ORAL EVERY 4 HOURS
Qty: 0 | Refills: 0 | Status: DISCONTINUED | OUTPATIENT
Start: 2019-02-19 | End: 2019-02-20

## 2019-02-19 RX ORDER — ACETAMINOPHEN 500 MG
1000 TABLET ORAL ONCE
Qty: 0 | Refills: 0 | Status: COMPLETED | OUTPATIENT
Start: 2019-02-19 | End: 2019-02-19

## 2019-02-19 RX ORDER — ACETAMINOPHEN 500 MG
650 TABLET ORAL EVERY 6 HOURS
Qty: 0 | Refills: 0 | Status: DISCONTINUED | OUTPATIENT
Start: 2019-02-19 | End: 2019-02-20

## 2019-02-19 RX ORDER — LAMOTRIGINE 25 MG/1
150 TABLET, ORALLY DISINTEGRATING ORAL AT BEDTIME
Qty: 0 | Refills: 0 | Status: DISCONTINUED | OUTPATIENT
Start: 2019-02-19 | End: 2019-02-20

## 2019-02-19 RX ORDER — DEXAMETHASONE 0.5 MG/5ML
1 ELIXIR ORAL EVERY 6 HOURS
Qty: 0 | Refills: 0 | Status: CANCELLED | OUTPATIENT
Start: 2019-02-22 | End: 2019-02-20

## 2019-02-19 RX ORDER — LAMOTRIGINE 25 MG/1
100 TABLET, ORALLY DISINTEGRATING ORAL DAILY
Qty: 0 | Refills: 0 | Status: DISCONTINUED | OUTPATIENT
Start: 2019-02-19 | End: 2019-02-20

## 2019-02-19 RX ORDER — DEXAMETHASONE 0.5 MG/5ML
2 ELIXIR ORAL EVERY 6 HOURS
Qty: 0 | Refills: 0 | Status: DISCONTINUED | OUTPATIENT
Start: 2019-02-21 | End: 2019-02-20

## 2019-02-19 RX ORDER — ATENOLOL 25 MG/1
25 TABLET ORAL DAILY
Qty: 0 | Refills: 0 | Status: DISCONTINUED | OUTPATIENT
Start: 2019-02-19 | End: 2019-02-20

## 2019-02-19 RX ORDER — SODIUM CHLORIDE 9 MG/ML
500 INJECTION INTRAMUSCULAR; INTRAVENOUS; SUBCUTANEOUS ONCE
Qty: 0 | Refills: 0 | Status: COMPLETED | OUTPATIENT
Start: 2019-02-20 | End: 2019-02-20

## 2019-02-19 RX ORDER — SODIUM CHLORIDE 9 MG/ML
1000 INJECTION INTRAMUSCULAR; INTRAVENOUS; SUBCUTANEOUS
Qty: 0 | Refills: 0 | Status: DISCONTINUED | OUTPATIENT
Start: 2019-02-19 | End: 2019-02-20

## 2019-02-19 RX ORDER — METOCLOPRAMIDE HCL 10 MG
10 TABLET ORAL ONCE
Qty: 0 | Refills: 0 | Status: COMPLETED | OUTPATIENT
Start: 2019-02-19 | End: 2019-02-19

## 2019-02-19 RX ADMIN — OXYCODONE HYDROCHLORIDE 10 MILLIGRAM(S): 5 TABLET ORAL at 17:20

## 2019-02-19 RX ADMIN — Medication 5 MILLIGRAM(S): at 17:50

## 2019-02-19 RX ADMIN — ONDANSETRON 4 MILLIGRAM(S): 8 TABLET, FILM COATED ORAL at 18:31

## 2019-02-19 RX ADMIN — Medication 5 MILLIGRAM(S): at 21:46

## 2019-02-19 RX ADMIN — Medication 1000 MILLIGRAM(S): at 23:01

## 2019-02-19 RX ADMIN — BENZOCAINE AND MENTHOL 1 LOZENGE: 5; 1 LIQUID ORAL at 14:12

## 2019-02-19 RX ADMIN — Medication 4 MILLIGRAM(S): at 17:48

## 2019-02-19 RX ADMIN — HYDROMORPHONE HYDROCHLORIDE 0.5 MILLIGRAM(S): 2 INJECTION INTRAMUSCULAR; INTRAVENOUS; SUBCUTANEOUS at 13:06

## 2019-02-19 RX ADMIN — HYDROMORPHONE HYDROCHLORIDE 0.5 MILLIGRAM(S): 2 INJECTION INTRAMUSCULAR; INTRAVENOUS; SUBCUTANEOUS at 12:48

## 2019-02-19 RX ADMIN — OXYCODONE HYDROCHLORIDE 10 MILLIGRAM(S): 5 TABLET ORAL at 16:47

## 2019-02-19 RX ADMIN — SODIUM CHLORIDE 125 MILLILITER(S): 9 INJECTION, SOLUTION INTRAVENOUS at 14:08

## 2019-02-19 RX ADMIN — Medication 400 MILLIGRAM(S): at 22:31

## 2019-02-19 RX ADMIN — Medication 10 MILLIGRAM(S): at 20:36

## 2019-02-19 RX ADMIN — Medication 4 MILLIGRAM(S): at 23:58

## 2019-02-19 RX ADMIN — HYDROMORPHONE HYDROCHLORIDE 0.5 MILLIGRAM(S): 2 INJECTION INTRAMUSCULAR; INTRAVENOUS; SUBCUTANEOUS at 13:01

## 2019-02-19 RX ADMIN — SODIUM CHLORIDE 60 MILLILITER(S): 9 INJECTION INTRAMUSCULAR; INTRAVENOUS; SUBCUTANEOUS at 21:46

## 2019-02-19 RX ADMIN — Medication 100 MILLIGRAM(S): at 17:48

## 2019-02-19 RX ADMIN — LAMOTRIGINE 150 MILLIGRAM(S): 25 TABLET, ORALLY DISINTEGRATING ORAL at 21:46

## 2019-02-19 RX ADMIN — HYDROMORPHONE HYDROCHLORIDE 0.5 MILLIGRAM(S): 2 INJECTION INTRAMUSCULAR; INTRAVENOUS; SUBCUTANEOUS at 13:20

## 2019-02-19 NOTE — PHYSICAL THERAPY INITIAL EVALUATION ADULT - STRENGTHENING, PT EVAL
GOAL: Pt will improve bilateral LE strength by 1/2 grade on MMT for increased limb stability, to improve gait and facilitate stair negotiation in 2 weeks.

## 2019-02-19 NOTE — PHYSICAL THERAPY INITIAL EVALUATION ADULT - ADDITIONAL COMMENTS
Pt lives in a PH with her spouse +2 steps to enter with HR, all needs met on main floor. Pt was ambulating independently without use of an AD and was independent with all ADLS PTA

## 2019-02-19 NOTE — BRIEF OPERATIVE NOTE - PROCEDURE
<<-----Click on this checkbox to enter Procedure Anterior cervical discectomy  02/19/2019    Active  [id number]

## 2019-02-19 NOTE — CHART NOTE - NSCHARTNOTEFT_GEN_A_CORE
Patient Resting c/o sore throat   No Chest Pain, SOB, N/V.    Pre op s/sx :  B/L UE radiculopathy L >R  ; Post op, patient reports: minimal change  .    Exam:   Alert/Oriented, No Acute Distress  Cards: +S1/S2, RRR  Pulm: CTAB  BACK:          Collar in place         Dressing: [x ] clean/dry/intact  [ ] Other:           Drains: :  GUSTAVO: 1cc         Sensation: [x ] intact to light touch  [ ] decreased:          Motor exam: [x  ]          [x ] Upper extremity                      Bi          Tri        Delt                                                    R         5/5        5/5        5/5                                               L          4/5        4/5        4/5                  [ ] Lower extremity                     PF          DF         EHL       FHL                                                                                            R        5/5        5/5        5/5       5/5                                                        L         5/5        5/5        5/5       5/5                                                                  Calves Soft/Non-tender bilaterally           [x ] warm well perfused; capillary refill <3 seconds              LABS:                        11.4<L>  6.5   )-----------( 203      ( 19 Feb 2019 12:46 )             33.0<L>    02-19    143  |  108  |  15  ----------------------------<  126<H>  4.0   |  22  |  0.73            A/P :  58y Female s/p Anterior cervical discectomy C5-7    -    Pain control  -    Taper  -     Full liquid diet for now  -     cont drain  -    DVT ppx: SCDs       -    Physical Therapy  -    Weight bearing status: WBAT [x ]        PWB    [ ]     TTWB  [ ]      NWB  [ ]  -    Dispo: Home [ x]     Rehab [ ]      LEXUS [ ]      To be determined [ ]      ***See Above  Patrick ACOSTA  Orthopedics  B: [phone]  S: [phone]

## 2019-02-19 NOTE — PHYSICAL THERAPY INITIAL EVALUATION ADULT - MD ORDER
Participant attended Diabetes #2 session today. Topics included: Prevention/detection/treatment of chronic complications; sleep apnea; Developing strategies to promote health/change behavior/recommended screenings; Developing strategies to address psychosocial issues; Goal setting. Participants goal/support plan includes  Nutritional Goal:  \" I am going to limit my carbohydrates between 45-60 grams and exercise an hour everyday to lower my sugar and blood pressure and reevaluate monthly. Medical Goal:  To reduce HgbA1c below 5. I will have my HgbA1c checked twice a year. Effective immediately and every three months for evaluation. Problems/barriers may be:none anticipated   Plan for follow up/Recommendations: mail follow up survey in 3 months.  Note faxed to [facility] due to external referral. Cx collar on at all times

## 2019-02-19 NOTE — PHYSICAL THERAPY INITIAL EVALUATION ADULT - TRANSFER TRAINING, PT EVAL
GOAL: Pt will perform ALL transfers (I), w/use of least restrictive assistive device as needed, in 2 weeks.

## 2019-02-19 NOTE — PHYSICAL THERAPY INITIAL EVALUATION ADULT - GENERAL OBSERVATIONS, REHAB EVAL
Pt rec'd supine in bed in NAD, VSS, +IV, +venodynes, +cx collar donned, +GUSTAVO drain, family at b/s, agreeable to PT

## 2019-02-19 NOTE — PHYSICAL THERAPY INITIAL EVALUATION ADULT - PERTINENT HX OF CURRENT PROBLEM, REHAB EVAL
57 Y/O F with H/O Seizures (last episode 2007), REJI Mild, Pacemaker 2004 (Bradycardia, fainted). C/O Neck pain radiating down bilateral arms for about 20 years, numbness left pinky and ring fingers, left leg pain and numbness. Seen MD. S/P Cat scan, EMG. Now s/p C5-C7 ACDF on 2/19

## 2019-02-19 NOTE — PHYSICAL THERAPY INITIAL EVALUATION ADULT - PLANNED THERAPY INTERVENTIONS, PT EVAL
stair negotiation GOAL: pt will ascend/descend 2 steps (I) with U HR and step to pattern in 2 weeks/balance training/gait training/transfer training/strengthening/bed mobility training

## 2019-02-20 ENCOUNTER — TRANSCRIPTION ENCOUNTER (OUTPATIENT)
Age: 59
End: 2019-02-20

## 2019-02-20 ENCOUNTER — INBOUND DOCUMENT (OUTPATIENT)
Age: 59
End: 2019-02-20

## 2019-02-20 VITALS — SYSTOLIC BLOOD PRESSURE: 94 MMHG | HEART RATE: 73 BPM | DIASTOLIC BLOOD PRESSURE: 54 MMHG

## 2019-02-20 DIAGNOSIS — M50.20 OTHER CERVICAL DISC DISPLACEMENT, UNSPECIFIED CERVICAL REGION: ICD-10-CM

## 2019-02-20 LAB
ANION GAP SERPL CALC-SCNC: 9 MMOL/L — SIGNIFICANT CHANGE UP (ref 5–17)
BASOPHILS # BLD AUTO: 0 K/UL — SIGNIFICANT CHANGE UP (ref 0–0.2)
BASOPHILS NFR BLD AUTO: 0 % — SIGNIFICANT CHANGE UP (ref 0–2)
BUN SERPL-MCNC: 12 MG/DL — SIGNIFICANT CHANGE UP (ref 7–23)
CALCIUM SERPL-MCNC: 8.9 MG/DL — SIGNIFICANT CHANGE UP (ref 8.4–10.5)
CHLORIDE SERPL-SCNC: 109 MMOL/L — HIGH (ref 96–108)
CO2 SERPL-SCNC: 24 MMOL/L — SIGNIFICANT CHANGE UP (ref 22–31)
CREAT SERPL-MCNC: 0.67 MG/DL — SIGNIFICANT CHANGE UP (ref 0.5–1.3)
EOSINOPHIL # BLD AUTO: 0 K/UL — SIGNIFICANT CHANGE UP (ref 0–0.5)
EOSINOPHIL NFR BLD AUTO: 0 % — SIGNIFICANT CHANGE UP (ref 0–6)
GLUCOSE SERPL-MCNC: 124 MG/DL — HIGH (ref 70–99)
HCT VFR BLD CALC: 29.7 % — LOW (ref 34.5–45)
HGB BLD-MCNC: 9.7 G/DL — LOW (ref 11.5–15.5)
IMM GRANULOCYTES NFR BLD AUTO: 0.2 % — SIGNIFICANT CHANGE UP (ref 0–1.5)
LYMPHOCYTES # BLD AUTO: 0.58 K/UL — LOW (ref 1–3.3)
LYMPHOCYTES # BLD AUTO: 10.4 % — LOW (ref 13–44)
MCHC RBC-ENTMCNC: 30.5 PG — SIGNIFICANT CHANGE UP (ref 27–34)
MCHC RBC-ENTMCNC: 32.7 GM/DL — SIGNIFICANT CHANGE UP (ref 32–36)
MCV RBC AUTO: 93.4 FL — SIGNIFICANT CHANGE UP (ref 80–100)
MONOCYTES # BLD AUTO: 0.21 K/UL — SIGNIFICANT CHANGE UP (ref 0–0.9)
MONOCYTES NFR BLD AUTO: 3.8 % — SIGNIFICANT CHANGE UP (ref 2–14)
NEUTROPHILS # BLD AUTO: 4.77 K/UL — SIGNIFICANT CHANGE UP (ref 1.8–7.4)
NEUTROPHILS NFR BLD AUTO: 85.6 % — HIGH (ref 43–77)
PLATELET # BLD AUTO: 214 K/UL — SIGNIFICANT CHANGE UP (ref 150–400)
POTASSIUM SERPL-MCNC: 4.4 MMOL/L — SIGNIFICANT CHANGE UP (ref 3.5–5.3)
POTASSIUM SERPL-SCNC: 4.4 MMOL/L — SIGNIFICANT CHANGE UP (ref 3.5–5.3)
RBC # BLD: 3.18 M/UL — LOW (ref 3.8–5.2)
RBC # FLD: 13 % — SIGNIFICANT CHANGE UP (ref 10.3–14.5)
SODIUM SERPL-SCNC: 142 MMOL/L — SIGNIFICANT CHANGE UP (ref 135–145)
SURGICAL PATHOLOGY STUDY: SIGNIFICANT CHANGE UP
WBC # BLD: 5.57 K/UL — SIGNIFICANT CHANGE UP (ref 3.8–10.5)
WBC # FLD AUTO: 5.57 K/UL — SIGNIFICANT CHANGE UP (ref 3.8–10.5)

## 2019-02-20 PROCEDURE — 97116 GAIT TRAINING THERAPY: CPT

## 2019-02-20 PROCEDURE — C1713: CPT

## 2019-02-20 PROCEDURE — C1889: CPT

## 2019-02-20 PROCEDURE — 97530 THERAPEUTIC ACTIVITIES: CPT

## 2019-02-20 PROCEDURE — 72020 X-RAY EXAM OF SPINE 1 VIEW: CPT

## 2019-02-20 PROCEDURE — 86900 BLOOD TYPING SEROLOGIC ABO: CPT

## 2019-02-20 PROCEDURE — 88304 TISSUE EXAM BY PATHOLOGIST: CPT

## 2019-02-20 PROCEDURE — 97161 PT EVAL LOW COMPLEX 20 MIN: CPT

## 2019-02-20 PROCEDURE — 85027 COMPLETE CBC AUTOMATED: CPT

## 2019-02-20 PROCEDURE — 80048 BASIC METABOLIC PNL TOTAL CA: CPT

## 2019-02-20 PROCEDURE — 86901 BLOOD TYPING SEROLOGIC RH(D): CPT

## 2019-02-20 RX ORDER — TRAMADOL HYDROCHLORIDE 50 MG/1
1 TABLET ORAL
Qty: 42 | Refills: 0
Start: 2019-02-20 | End: 2019-02-26

## 2019-02-20 RX ORDER — DIAZEPAM 5 MG
1 TABLET ORAL
Qty: 21 | Refills: 0
Start: 2019-02-20 | End: 2019-02-26

## 2019-02-20 RX ORDER — OXYCODONE HYDROCHLORIDE 5 MG/1
1 TABLET ORAL
Qty: 0 | Refills: 0 | COMMUNITY
Start: 2019-02-20

## 2019-02-20 RX ORDER — SODIUM CHLORIDE 9 MG/ML
500 INJECTION INTRAMUSCULAR; INTRAVENOUS; SUBCUTANEOUS ONCE
Qty: 0 | Refills: 0 | Status: COMPLETED | OUTPATIENT
Start: 2019-02-20 | End: 2019-02-20

## 2019-02-20 RX ORDER — DOCUSATE SODIUM 100 MG
1 CAPSULE ORAL
Qty: 0 | Refills: 0 | DISCHARGE
Start: 2019-02-20

## 2019-02-20 RX ORDER — OXYCODONE HYDROCHLORIDE 5 MG/1
1 TABLET ORAL
Qty: 30 | Refills: 0
Start: 2019-02-20

## 2019-02-20 RX ORDER — TRAMADOL HYDROCHLORIDE 50 MG/1
1 TABLET ORAL
Qty: 0 | Refills: 0 | COMMUNITY
Start: 2019-02-20

## 2019-02-20 RX ORDER — DIAZEPAM 5 MG
1 TABLET ORAL
Qty: 0 | Refills: 0 | COMMUNITY
Start: 2019-02-20

## 2019-02-20 RX ORDER — DEXAMETHASONE 0.5 MG/5ML
1 ELIXIR ORAL
Qty: 24 | Refills: 0
Start: 2019-02-20

## 2019-02-20 RX ORDER — ACETAMINOPHEN 500 MG
1000 TABLET ORAL ONCE
Qty: 0 | Refills: 0 | Status: COMPLETED | OUTPATIENT
Start: 2019-02-20 | End: 2019-02-20

## 2019-02-20 RX ORDER — PANTOPRAZOLE SODIUM 20 MG/1
1 TABLET, DELAYED RELEASE ORAL
Qty: 30 | Refills: 0
Start: 2019-02-20

## 2019-02-20 RX ORDER — ACETAMINOPHEN 500 MG
2 TABLET ORAL
Qty: 0 | Refills: 0 | DISCHARGE
Start: 2019-02-20

## 2019-02-20 RX ADMIN — SODIUM CHLORIDE 1000 MILLILITER(S): 9 INJECTION INTRAMUSCULAR; INTRAVENOUS; SUBCUTANEOUS at 05:50

## 2019-02-20 RX ADMIN — Medication 400 MILLIGRAM(S): at 07:01

## 2019-02-20 RX ADMIN — Medication 4 MILLIGRAM(S): at 11:38

## 2019-02-20 RX ADMIN — LAMOTRIGINE 100 MILLIGRAM(S): 25 TABLET, ORALLY DISINTEGRATING ORAL at 11:21

## 2019-02-20 RX ADMIN — Medication 4 MILLIGRAM(S): at 05:50

## 2019-02-20 RX ADMIN — Medication 100 MILLIGRAM(S): at 05:50

## 2019-02-20 RX ADMIN — SODIUM CHLORIDE 666.67 MILLILITER(S): 9 INJECTION INTRAMUSCULAR; INTRAVENOUS; SUBCUTANEOUS at 06:31

## 2019-02-20 RX ADMIN — Medication 100 MILLIGRAM(S): at 02:07

## 2019-02-20 NOTE — PROGRESS NOTE ADULT - SUBJECTIVE AND OBJECTIVE BOX
Post op Day [1 ]    Patient resting, had some nausea last night which has resolved.  Has appropriate postop pain.  No chest pain, SOB, N/V.    T(C): 36.4 (02-20-19 @ 04:56), Max: 36.8 (02-19-19 @ 06:30)  HR: 65 (02-20-19 @ 04:56) (63 - 78)  BP: 107/69 (02-20-19 @ 04:56) (93/54 - 143/77)  RR: 18 (02-20-19 @ 04:56) (16 - 18)  SpO2: 100% (02-20-19 @ 04:56) (96% - 100%)  Wt(kg): --    Exam:  Alert and Oriented, No Acute Distress  Manati J collar intact, GUSTAVO drain w/ serosang drainge 25/45, dsg c/d/i  UE 5/5 bilat bi/tri/delt, SILT  Calves Soft, Non-tender bilaterally  +PF/DF/EHL/FHL 5/5 bilat  SILT bilat                        11.4   6.5   )-----------( 203      ( 19 Feb 2019 12:46 )             33.0    02-19    143  |  108  |  15  ----------------------------<  126<H>  4.0   |  22  |  0.73    Ca    9.1      19 Feb 2019 12:47

## 2019-02-20 NOTE — DISCHARGE NOTE ADULT - PLAN OF CARE
Pain relief, improvement with activities of daily living Please call Dr. Connell's office within next few days to schedule a follow up appointment after surgery. Dressing will be changed during office visit. Out of bed, ambulate, with cervical collar. Physical therapy to assist with exercise and help increase endurance as out patient.

## 2019-02-20 NOTE — DISCHARGE NOTE ADULT - CARE PROVIDER_API CALL
Rufus Connell)  Orthopaedic Surgery  [address], [address]  [place] [address]  Phone: [phone]  Fax: [phone]  Follow Up Time:

## 2019-02-20 NOTE — DISCHARGE NOTE ADULT - CARE PLAN
Principal Discharge DX:	Cervical disc displacement  Goal:	Pain relief, improvement with activities of daily living  Assessment and plan of treatment:	Please call Dr. Connell's office within next few days to schedule a follow up appointment after surgery. Dressing will be changed during office visit. Out of bed, ambulate, with cervical collar. Physical therapy to assist with exercise and help increase endurance as out patient.

## 2019-02-20 NOTE — CHART NOTE - NSCHARTNOTEFT_GEN_A_CORE
Patient visited for drain pull. Purse string closed.  Hemostasis achieved, patient tolerated well, tip intact. 4x4 Dsg/tegaderm placed.    Ata Peck PA-C  Team Pager: #[phone]

## 2019-02-20 NOTE — DISCHARGE NOTE ADULT - MEDICATION SUMMARY - MEDICATIONS TO TAKE
I will START or STAY ON the medications listed below when I get home from the hospital:    dexamethasone 1 mg oral tablet  -- Steroid taper-3 mg po q6 hrs x 4 doses then,2 mg po q6 hrs x 4 doses then,1 mg po q6 hrs x 4 doses then, stop  -- It is very important that you take or use this exactly as directed.  Do not skip doses or discontinue unless directed by your doctor.  Obtain medical advice before taking any non-prescription drugs as some may affect the action of this medication.  Take with food or milk.    -- Indication: For Antiinflammatory agent    oxyCODONE 10 mg oral tablet  -- 1 tab(s) by mouth every 4 hours, As needed, Severe Pain (7 - 10) MDD:4  -- Indication: For Pain medication    traMADol 50 mg oral tablet  -- 1 tab(s) by mouth every 4 hours, As needed, Moderate Pain (4 - 6) MDD:4  -- Indication: For Pain medication    acetaminophen 325 mg oral tablet  -- 2 tab(s) by mouth every 6 hours, As needed, Temp greater or equal to 38C (100.4F), Mild Pain (1 - 3)  -- Indication: For Temps or pain    lamoTRIgine 100 mg oral tablet  -- 1 tab(s) by mouth once a day (in the morning)  -- Indication: For Anticonvulsant    lamoTRIgine 150 mg oral tablet  -- 1 tab(s) by mouth once a day (at bedtime)  -- Indication: For Anticonvulsant    diazePAM 5 mg oral tablet  -- 1 tab(s) by mouth every 8 hours, As needed, Anxiety or spasm MDD:3  -- Indication: For Muscle spasms    atenolol 25 mg oral tablet  -- 1 tab(s) by mouth once a day  -- Indication: For Antihypertensive agent    docusate sodium 100 mg oral capsule  -- 1 cap(s) by mouth 3 times a day  -- Indication: For Stool softener    Protonix 40 mg oral delayed release tablet  -- 1 tab(s) by mouth once a day  -- Indication: For Antidyspepsia agent    Multiple Vitamins oral tablet  -- 1 tab(s) by mouth once a day  -- Indication: For SUPPLEMENT I will START or STAY ON the medications listed below when I get home from the hospital:    dexamethasone 1 mg oral tablet  -- Steroid taper-3 mg po q6 hrs x 4 doses then,2 mg po q6 hrs x 4 doses then,1 mg po q6 hrs x 4 doses then, stop  -- It is very important that you take or use this exactly as directed.  Do not skip doses or discontinue unless directed by your doctor.  Obtain medical advice before taking any non-prescription drugs as some may affect the action of this medication.  Take with food or milk.    -- Indication: For Antiinflammatory agent    oxyCODONE 10 mg oral tablet  -- 1 tab(s) by mouth every 4 hours, As needed, Severe Pain (7 - 10) MDD:4  -- Indication: For Pain    traMADol 50 mg oral tablet  -- 1 tab(s) by mouth every 4 hours, As needed, Moderate Pain (4 - 6) MDD:4  -- Indication: For Pain    acetaminophen 325 mg oral tablet  -- 2 tab(s) by mouth every 6 hours, As needed, Temp greater or equal to 38C (100.4F), Mild Pain (1 - 3)  -- Indication: For Temps or Pain    lamoTRIgine 100 mg oral tablet  -- 1 tab(s) by mouth once a day (in the morning)  -- Indication: For Anticonvulsant    lamoTRIgine 150 mg oral tablet  -- 1 tab(s) by mouth once a day (at bedtime)  -- Indication: For Anticonvulsant    diazePAM 5 mg oral tablet  -- 1 tab(s) by mouth every 8 hours, As needed, Anxiety or spasm MDD:3  -- Indication: For Muscle spasms    atenolol 25 mg oral tablet  -- 1 tab(s) by mouth once a day  -- Indication: For Antihypertensive agent    docusate sodium 100 mg oral capsule  -- 1 cap(s) by mouth 3 times a day  -- Indication: For Stool softener    Protonix 40 mg oral delayed release tablet  -- 1 tab(s) by mouth once a day  -- Indication: For Antidyspepsia agent    Multiple Vitamins oral tablet  -- 1 tab(s) by mouth once a day  -- Indication: For Supplement

## 2019-02-20 NOTE — DISCHARGE NOTE ADULT - PATIENT PORTAL LINK FT
You can access the [facility] Patient Portal, offered by [facility], by registering with the following website: [email or web address]

## 2019-02-20 NOTE — PROGRESS NOTE ADULT - PROBLEM SELECTOR PLAN 1
PT/OT-WBAT, jacinto ARMSTRONG at all times  IS  DVT PPx-venodynes  Pain Control  Continue Current Tx.    Ata Peck PA-C  Team Pager: #[phone]

## 2019-02-20 NOTE — DISCHARGE NOTE ADULT - NS AS ACTIVITY OBS
No Heavy lifting/straining/Stairs allowed/Do not make important decisions/Do not drive or operate machinery/Walking-Indoors allowed/Keep wound clean and dry/Walking-Outdoors allowed

## 2019-02-20 NOTE — PROGRESS NOTE ADULT - ATTENDING COMMENTS
Attending:  patient seen and examined    agree with housestaff assessment    Plan as per above    Ko CABRERA

## 2019-02-21 ENCOUNTER — APPOINTMENT (OUTPATIENT)
Dept: ELECTROPHYSIOLOGY | Facility: CLINIC | Age: 59
End: 2019-02-21

## 2019-02-21 PROBLEM — I49.5 SICK SINUS SYNDROME: Chronic | Status: ACTIVE | Noted: 2019-02-12

## 2019-02-21 PROBLEM — M54.12 RADICULOPATHY, CERVICAL REGION: Chronic | Status: ACTIVE | Noted: 2019-02-12

## 2019-02-21 PROBLEM — M50.20 OTHER CERVICAL DISC DISPLACEMENT, UNSPECIFIED CERVICAL REGION: Chronic | Status: ACTIVE | Noted: 2019-02-12

## 2019-02-25 ENCOUNTER — MEDICATION RENEWAL (OUTPATIENT)
Age: 59
End: 2019-02-25

## 2019-03-01 ENCOUNTER — APPOINTMENT (OUTPATIENT)
Dept: ORTHOPEDIC SURGERY | Facility: CLINIC | Age: 59
End: 2019-03-01
Payer: COMMERCIAL

## 2019-03-01 VITALS — BODY MASS INDEX: 24.49 KG/M2 | WEIGHT: 147 LBS | HEIGHT: 65 IN

## 2019-03-01 DIAGNOSIS — M50.20 OTHER CERVICAL DISC DISPLACEMENT, UNSPECIFIED CERVICAL REGION: ICD-10-CM

## 2019-03-01 PROCEDURE — 72040 X-RAY EXAM NECK SPINE 2-3 VW: CPT

## 2019-03-01 PROCEDURE — 99024 POSTOP FOLLOW-UP VISIT: CPT

## 2019-03-08 ENCOUNTER — RX RENEWAL (OUTPATIENT)
Age: 59
End: 2019-03-08

## 2019-03-11 ENCOUNTER — APPOINTMENT (OUTPATIENT)
Dept: ORTHOPEDIC SURGERY | Facility: CLINIC | Age: 59
End: 2019-03-11
Payer: COMMERCIAL

## 2019-03-11 VITALS — HEIGHT: 65 IN | BODY MASS INDEX: 24.49 KG/M2 | WEIGHT: 147 LBS

## 2019-03-11 PROCEDURE — 99024 POSTOP FOLLOW-UP VISIT: CPT

## 2019-03-11 RX ORDER — PREDNISONE 10 MG/1
10 TABLET ORAL
Qty: 21 | Refills: 0 | Status: DISCONTINUED | COMMUNITY
Start: 2019-02-25 | End: 2019-03-11

## 2019-03-11 RX ORDER — VALACYCLOVIR 500 MG/1
500 TABLET, FILM COATED ORAL
Qty: 6 | Refills: 0 | Status: DISCONTINUED | COMMUNITY
Start: 2018-12-18

## 2019-03-11 RX ORDER — CLINDAMYCIN PHOSPHATE 1 G/10ML
1 GEL TOPICAL
Qty: 30 | Refills: 0 | Status: DISCONTINUED | COMMUNITY
Start: 2018-12-18

## 2019-03-13 ENCOUNTER — APPOINTMENT (OUTPATIENT)
Dept: NEUROLOGY | Facility: CLINIC | Age: 59
End: 2019-03-13
Payer: COMMERCIAL

## 2019-03-13 VITALS
DIASTOLIC BLOOD PRESSURE: 76 MMHG | HEART RATE: 69 BPM | HEIGHT: 65 IN | BODY MASS INDEX: 24.16 KG/M2 | WEIGHT: 145 LBS | SYSTOLIC BLOOD PRESSURE: 114 MMHG

## 2019-03-13 PROCEDURE — 99214 OFFICE O/P EST MOD 30 MIN: CPT

## 2019-03-13 NOTE — DATA REVIEWED
[de-identified] : 4/2015 CTs have been normal of head, and she cannot have MRI and did not have one prior to the pacemaker.  \par 2016 CT moderate canal stenosis, cord flattening.\par 2019 CT C4-5 central left disc with compression. C5-6, C6-7 root issues.  T11-12 right disc with root compression, L5-S1 disc and root compression. [de-identified] :  EEG showed left temporal spikes in past [de-identified] : Lamictal level was 6.3 in July 2013 on 300 mg per day but dose decreased to 250 for the day due to side effects.  Previous level was 4.1 on 200 mg per day.  LTG 5.2 2016

## 2019-03-13 NOTE — REVIEW OF SYSTEMS
[As Noted in HPI] : as noted in HPI [Arthralgias] : arthralgias [Negative] : Heme/Lymph [de-identified] : She is complaining of loss of taste without loss of smell.   [FreeTextEntry9] : right arm pain

## 2019-03-13 NOTE — HISTORY OF PRESENT ILLNESS
[FreeTextEntry1] : Updates:  s/p cervical neck surgery feb 2019. some improvement in neck pain, tension her shoulder blades with head turn. She is doing physical therapy.  no perceived loss of strength in hands or biceps.  \par less pain radiation of pain to left ear.  some numbness in throat area , no spasms of face\par \par No recent locking up of hands, left 4th 5th fingers no more numbness, \par Less pain in left buttock pain from sleep, less 4-5th digits numbness on left foot.\par \par no seizures for many years. \par no szs, no ADRs on /150mg low dose.\par \par On ROS: +/-sleeping OK,  and she has not intentionally lost weight.  Mood is good.  no wt change\par \par PMHX: 57 yo RH F with convulsive seizures in 5/2010, 5/2008 x2, and episodes of LOC prior to this since age 44 (unclear if seizures or cardiac).  She had asystole documented in 2008 and had pacemaker placed at that time, with prior h/o afib.  Describes prior spells of flushing sensation from head to toe with fatigue a few yrs ago.\par She has had no seizures in 5 years, and is driving now.  She is working and is . \par \par She thinks that she has had about three convulsions in her lifetime and cannot count the partial ones, but has not had one in many years.  \par \par Father had seizures after head trauma, and there are no other risk factors.

## 2019-03-13 NOTE — DISCUSSION/SUMMARY
[Well-controlled] : well-controlled [Secondary Generalization] : secondary generalization [Idiopathic] : idiopathic  [Focal] : focal [Safety Recommendations] : The patient was advised in regards to the risk of seizures and general seizure safety recommendations including not to be bathing alone, climbing to high places and operating heavy machinery. [Medication Side Effects] : High frequency and serious potential medication adverse effects were reviewed with the patient, including but not exclusive to psychiatric effects.  Information sheets on medication side effects were made available to the patient in our clinic.  The patient or advocate agrees to notify us for any concerns. [Sleep Hygiene/Sleep Disruption Risks] : Sleep hygiene and the risks of sleep disruption were discussed. 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    \par H/o syncope, s/p PPM.\par \par s/p surgery for cervical myelopathy/stenosis, max at C4-C5.  some hand numbness intermittently on left pre-op, improved.  less complaints of dysesthesia of the ear (numbness in the lower half of the ear/lobe, and behind mandible in neck in C2/3 distribution to pinprick).  Ear complaints may be of high superficial cervical plexus etiology (C2-C3 greater auricular n.)\par \par L5-S1 likely causitive of left buttock pain, sciatica, and foot dysesthesias. \par \par Will renew meds. Cont /d\par LTG level, target 4-6\par \par PT

## 2019-03-14 ENCOUNTER — LABORATORY RESULT (OUTPATIENT)
Age: 59
End: 2019-03-14

## 2019-03-18 ENCOUNTER — APPOINTMENT (OUTPATIENT)
Dept: ORTHOPEDIC SURGERY | Facility: CLINIC | Age: 59
End: 2019-03-18
Payer: COMMERCIAL

## 2019-03-18 ENCOUNTER — APPOINTMENT (OUTPATIENT)
Dept: ORTHOPEDIC SURGERY | Facility: CLINIC | Age: 59
End: 2019-03-18

## 2019-03-18 VITALS — HEIGHT: 65 IN | BODY MASS INDEX: 24.16 KG/M2 | WEIGHT: 145 LBS

## 2019-03-18 PROCEDURE — 99024 POSTOP FOLLOW-UP VISIT: CPT

## 2019-03-18 PROCEDURE — 72040 X-RAY EXAM NECK SPINE 2-3 VW: CPT

## 2019-04-03 ENCOUNTER — RESULT REVIEW (OUTPATIENT)
Age: 59
End: 2019-04-03

## 2019-04-17 ENCOUNTER — FORM ENCOUNTER (OUTPATIENT)
Age: 59
End: 2019-04-17

## 2019-04-17 ENCOUNTER — RX RENEWAL (OUTPATIENT)
Age: 59
End: 2019-04-17

## 2019-04-18 ENCOUNTER — APPOINTMENT (OUTPATIENT)
Dept: CT IMAGING | Facility: CLINIC | Age: 59
End: 2019-04-18

## 2019-04-18 ENCOUNTER — APPOINTMENT (OUTPATIENT)
Dept: RADIOLOGY | Facility: IMAGING CENTER | Age: 59
End: 2019-04-18
Payer: COMMERCIAL

## 2019-04-18 ENCOUNTER — OUTPATIENT (OUTPATIENT)
Dept: OUTPATIENT SERVICES | Facility: HOSPITAL | Age: 59
LOS: 1 days | End: 2019-04-18
Payer: COMMERCIAL

## 2019-04-18 ENCOUNTER — MESSAGE (OUTPATIENT)
Age: 59
End: 2019-04-18

## 2019-04-18 ENCOUNTER — APPOINTMENT (OUTPATIENT)
Dept: NEUROLOGY | Facility: CLINIC | Age: 59
End: 2019-04-18
Payer: COMMERCIAL

## 2019-04-18 VITALS — DIASTOLIC BLOOD PRESSURE: 76 MMHG | HEIGHT: 65 IN | HEART RATE: 65 BPM | SYSTOLIC BLOOD PRESSURE: 118 MMHG

## 2019-04-18 DIAGNOSIS — R51 HEADACHE: ICD-10-CM

## 2019-04-18 DIAGNOSIS — Z98.890 OTHER SPECIFIED POSTPROCEDURAL STATES: Chronic | ICD-10-CM

## 2019-04-18 DIAGNOSIS — M48.02 SPINAL STENOSIS, CERVICAL REGION: ICD-10-CM

## 2019-04-18 DIAGNOSIS — M54.2 CERVICALGIA: ICD-10-CM

## 2019-04-18 PROCEDURE — 70498 CT ANGIOGRAPHY NECK: CPT

## 2019-04-18 PROCEDURE — 70498 CT ANGIOGRAPHY NECK: CPT | Mod: 26

## 2019-04-18 PROCEDURE — 70496 CT ANGIOGRAPHY HEAD: CPT

## 2019-04-18 PROCEDURE — 72040 X-RAY EXAM NECK SPINE 2-3 VW: CPT | Mod: 26

## 2019-04-18 PROCEDURE — 70496 CT ANGIOGRAPHY HEAD: CPT | Mod: 26

## 2019-04-18 PROCEDURE — 82565 ASSAY OF CREATININE: CPT

## 2019-04-18 PROCEDURE — 72040 X-RAY EXAM NECK SPINE 2-3 VW: CPT

## 2019-04-18 PROCEDURE — 99214 OFFICE O/P EST MOD 30 MIN: CPT

## 2019-04-18 NOTE — DISCUSSION/SUMMARY
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    \par H/o syncope, s/p PPM.\par \par s/p surgery for cervical myelopathy/stenosis, max at C4-C5.  some hand numbness intermittently on left pre-op, improved.  less complaints of dysesthesia of the ear (numbness in the lower half of the ear/lobe, and behind mandible in neck in C2/3 distribution to pinprick).  Ear complaints may be of high superficial cervical plexus etiology (C2-C3 greater auricular n.)\par \par L5-S1 likely causitive of left buttock pain, sciatica, and foot dysesthesias. \par \par C/o HA for last 3 days.  CT CTV CTA and exam reassurring. \par Cannot take NSAID.  on tylenol PRN.  consider use of sumatriptan if migrainous in nature.  oxycodone at Trial valium 5mg, benedryl\par home from surg, may take if pain intractable.\par PT for neck stiffness\par discussed risk/benefit of trial of triptan.  some risk of arrhythmia, but discussed with cardiology and pt has a PPM.  will Rx imitrex 100mg for status migrainosis trial\par \par Cont /d\par

## 2019-04-18 NOTE — REVIEW OF SYSTEMS
[de-identified] : She is complaining of loss of taste without loss of smell.   [FreeTextEntry9] : right arm pain

## 2019-04-18 NOTE — PHYSICAL EXAM
[Past-pointing] : there was no past-pointing [Tremor] : no tremor present [Plantar Reflex Right Only] : normal on the right [Plantar Reflex Left Only] : normal on the left [___] : absent on the right [FreeTextEntry6] : mild ABP weakness, and atrophy of thenar eminence [FreeTextEntry5] : hearing intact bilat.  left ear numb to PP left lobe and post auricular/post mandibular area.  [___] : absent on the left

## 2019-04-18 NOTE — HISTORY OF PRESENT ILLNESS
[FreeTextEntry1] : Updates:  comes in today for c/o HA starting 3 days ago, occurred from lying in bed prior to sleep.  persistent since then,  posterior head location, pressure sensation, 7-8/10 initially, only minimal improvement p tylenol. told to not take any NSAID due to recent neck surgery.  CT CTA CTV done to exclude dissection, ICH, SAH.  wet read per nrad is neg.  a little nausea, no light sensitivity.  persists p waking and through the day escalates.  no sinus or URI issues.\par \par Denies migraines in past, occ milder HA with muscle tension.\par \par s/p cervical neck surgery feb 2019. had neck pain, tension her neck, shoulder blades with head turn. She is doing physical therapy.  no perceived loss of strength in hands or biceps.  \par less pain radiation of pain to left ear.  some numbness in throat area still, no spasms of face\par \par No recent locking up of hands, left 4th 5th fingers no more numbness, \par Less pain in left buttock pain from sleep, less 4-5th digits numbness on left foot.\par \par no seizures for many years. \par no szs, no ADRs on /150mg low dose.\par \par On ROS: +/-sleeping OK,  and she has not intentionally lost weight.  Mood is good.  no wt change\par \par PMHX: 57 yo RH F with convulsive seizures in 5/2010, 5/2008 x2, and episodes of LOC prior to this since age 44 (unclear if seizures or cardiac).  She had asystole documented in 2008 and had pacemaker placed at that time, with prior h/o afib.  Describes prior spells of flushing sensation from head to toe with fatigue a few yrs ago.\par She has had no seizures in 5 years, and is driving now.  She is working and is . \par \par She thinks that she has had about three convulsions in her lifetime and cannot count the partial ones, but has not had one in many years.  \par \par Father had seizures after head trauma, and there are no other risk factors.

## 2019-04-18 NOTE — DATA REVIEWED
[de-identified] :  EEG showed left temporal spikes in past [de-identified] : 4/2015 CTs have been normal of head, and she cannot have MRI and did not have one prior to the pacemaker.  \par 2016 CT moderate canal stenosis, cord flattening.\par 2019 CT C4-5 central left disc with compression. C5-6, C6-7 root issues.  T11-12 right disc with root compression, L5-S1 disc and root compression. [de-identified] : Lamictal level was 6.3 in July 2013 on 300 mg per day but dose decreased to 250 for the day due to side effects.  Previous level was 4.1 on 200 mg per day.  LTG 5.2 2016

## 2019-04-21 ENCOUNTER — CLINICAL ADVICE (OUTPATIENT)
Age: 59
End: 2019-04-21

## 2019-04-22 ENCOUNTER — APPOINTMENT (OUTPATIENT)
Dept: ORTHOPEDIC SURGERY | Facility: CLINIC | Age: 59
End: 2019-04-22
Payer: COMMERCIAL

## 2019-04-22 VITALS — HEIGHT: 65 IN | WEIGHT: 145 LBS | BODY MASS INDEX: 24.16 KG/M2

## 2019-04-22 PROCEDURE — 99024 POSTOP FOLLOW-UP VISIT: CPT

## 2019-06-05 ENCOUNTER — APPOINTMENT (OUTPATIENT)
Dept: ELECTROPHYSIOLOGY | Facility: CLINIC | Age: 59
End: 2019-06-05
Payer: COMMERCIAL

## 2019-06-05 PROCEDURE — 93280 PM DEVICE PROGR EVAL DUAL: CPT

## 2019-06-06 ENCOUNTER — APPOINTMENT (OUTPATIENT)
Dept: INTERNAL MEDICINE | Facility: CLINIC | Age: 59
End: 2019-06-06
Payer: COMMERCIAL

## 2019-06-06 VITALS
HEART RATE: 75 BPM | SYSTOLIC BLOOD PRESSURE: 100 MMHG | HEIGHT: 65 IN | DIASTOLIC BLOOD PRESSURE: 70 MMHG | WEIGHT: 149 LBS | OXYGEN SATURATION: 95 % | BODY MASS INDEX: 24.83 KG/M2 | TEMPERATURE: 99 F

## 2019-06-06 PROCEDURE — 99214 OFFICE O/P EST MOD 30 MIN: CPT | Mod: 25

## 2019-06-06 PROCEDURE — 36415 COLL VENOUS BLD VENIPUNCTURE: CPT

## 2019-06-06 NOTE — HISTORY OF PRESENT ILLNESS
[FreeTextEntry8] : 59 y/o female present with left leg swelling for 3 weeks intermittent.   She has been feeling well overall.  She has a healthy diet  low sodium.  No chest pains no calf pains no VALENCIA no leg claudication \par She does admit she sits and stands for long period of time

## 2019-06-06 NOTE — PLAN
[FreeTextEntry1] : Pt will have further evaluation with BW done today as well as go for Venous doppler and arterial duplex of LE\par She will continue with healthy diet and normal fluid intake as she has been doing so.\par Med list reviewed and no changes\par She is advised to avoid sitting for to long and going for a walk.  If standing for a long time take a break and elevated feet.\par She will be advised of images and blood work once reviewed.   \par She will call me right away or RTO if worsens before then or if new symptoms

## 2019-06-06 NOTE — PHYSICAL EXAM
[No Acute Distress] : no acute distress [Well Nourished] : well nourished [No JVD] : no jugular venous distention [Supple] : supple [No Respiratory Distress] : no respiratory distress  [Clear to Auscultation] : lungs were clear to auscultation bilaterally [No Accessory Muscle Use] : no accessory muscle use [Regular Rhythm] : with a regular rhythm [Normal Rate] : normal rate  [Pedal Pulses Present] : the pedal pulses are present [Normal S1, S2] : normal S1 and S2 [No Extremity Clubbing/Cyanosis] : no extremity clubbing/cyanosis [de-identified] : Normal pedal pulses Mild edema on exam not even a +1 but pt has had stockings on all day

## 2019-06-06 NOTE — REVIEW OF SYSTEMS
[Lower Ext Edema] : lower extremity edema [Negative] : Musculoskeletal [FreeTextEntry9] : Besides today some neck pain S/P surgery otherwise no pains

## 2019-06-07 ENCOUNTER — APPOINTMENT (OUTPATIENT)
Dept: NEUROLOGY | Facility: CLINIC | Age: 59
End: 2019-06-07
Payer: COMMERCIAL

## 2019-06-07 PROCEDURE — 99213 OFFICE O/P EST LOW 20 MIN: CPT

## 2019-06-07 RX ORDER — SUMATRIPTAN 100 MG/1
100 TABLET, FILM COATED ORAL ONCE
Qty: 5 | Refills: 0 | Status: DISCONTINUED | COMMUNITY
Start: 2019-04-18 | End: 2019-06-07

## 2019-06-07 RX ORDER — DIAZEPAM 5 MG/1
5 TABLET ORAL AT BEDTIME
Qty: 10 | Refills: 0 | Status: DISCONTINUED | COMMUNITY
Start: 2019-04-18 | End: 2019-06-07

## 2019-06-07 NOTE — PHYSICAL EXAM
[General Appearance - Alert] : alert [Impaired Insight] : insight and judgment were intact [Oriented To Time, Place, And Person] : oriented to person, place, and time [General Appearance - In No Acute Distress] : in no acute distress [Affect] : the affect was normal [Person] : oriented to person [Place] : oriented to place [Time] : oriented to time [Short Term Intact] : short term memory intact [Registration Intact] : recent registration memory intact [Remote Intact] : remote memory intact [Span Intact] : the attention span was normal [Visual Intact] : visual attention was ~T not ~L decreased [Concentration Intact] : normal concentrating ability [Repeating Phrases] : no difficulty repeating a phrase [Writing A Sentence] : no difficulty writing a sentence [Naming Objects] : no difficulty naming common objects [Comprehension] : comprehension intact [Fluency] : fluency intact [Current Events] : adequate knowledge of current events [Past History] : adequate knowledge of personal past history [Reading] : reading intact [Cranial Nerves Oculomotor (III)] : extraocular motion intact [Cranial Nerves Optic (II)] : visual acuity intact bilaterally,  visual fields full to confrontation, pupils equal round and reactive to light [Vocabulary] : adequate range of vocabulary [Cranial Nerves Facial (VII)] : face symmetrical [Cranial Nerves Trigeminal (V)] : facial sensation intact symmetrically [Motor Tone] : muscle tone was normal in all four extremities [Cranial Nerves Hypoglossal (XII)] : there was no tongue deviation with protrusion [Cranial Nerves Accessory (XI - Cranial And Spinal)] : head turning and shoulder shrug symmetric [Cranial Nerves Glossopharyngeal (IX)] : tongue and palate midline [No Muscle Atrophy] : normal bulk in all four extremities [Motor Strength] : muscle strength was normal in all four extremities [Motor Handedness Right-Handed] : the patient is right hand dominant [Sensation Tactile Decrease] : light touch was intact [Sensation Pain / Temperature Decrease] : pain and temperature was intact [Balance] : balance was intact [Abnormal Walk] : normal gait [3+] : Patella right 3+ [2+] : Ankle jerk left 2+ [Sclera] : the sclera and conjunctiva were normal [PERRL With Normal Accommodation] : pupils were equal in size, round, reactive to light, with normal accommodation [Extraocular Movements] : extraocular movements were intact [Oropharynx] : the oropharynx was normal [Outer Ear] : the ears and nose were normal in appearance [Neck Appearance] : the appearance of the neck was normal [Jugular Venous Distention Increased] : there was no jugular-venous distention [Neck Cervical Mass (___cm)] : no neck mass was observed [Thyroid Diffuse Enlargement] : the thyroid was not enlarged [Auscultation Breath Sounds / Voice Sounds] : lungs were clear to auscultation bilaterally [Thyroid Nodule] : there were no palpable thyroid nodules [Heart Rate And Rhythm] : heart rate was normal and rhythm regular [Heart Sounds] : normal S1 and S2 [Murmurs] : no murmurs [Heart Sounds Gallop] : no gallops [Heart Sounds Pericardial Friction Rub] : no pericardial rub [Bowel Sounds] : normal bowel sounds [Nail Clubbing] : no clubbing  or cyanosis of the fingernails [Skin Lesions] : no skin lesions [] : no rash [Past-pointing] : there was no past-pointing [Tremor] : no tremor present [Plantar Reflex Right Only] : normal on the right [Plantar Reflex Left Only] : normal on the left [___] : absent on the right [___] : absent on the left [FreeTextEntry6] : mild ABP weakness, and atrophy of thenar eminence [FreeTextEntry5] : hearing intact bilat.  left ear numb to PP left lobe and post auricular/post mandibular area.

## 2019-06-07 NOTE — HISTORY OF PRESENT ILLNESS
[FreeTextEntry1] : Updates:  HA better as of late. posterior head location, pressure sensation, 7-8/10 initially, improved. a little nausea, no light sensitivity.  CT CTA CTV done to exclude dissection, ICH, SAH neg.  \par HA better with accupuncture, no severe in the last month.\par no sinus or URI issues.\par \par Denies migraines in past, occ milder HA with muscle tension in back of head/occiput.  using NSAIDs prn now.\par \par s/p cervical neck surgery feb 2019. had neck pain, tension her neck, shoulder blades with head turn. She is doing physical therapy.  no perceived loss of strength in hands or biceps.   had been improving\par no recent radiation of pain to left ear.  some numbness in throat area still, no spasms of face\par \par No recent locking up of hands, left 4th 5th fingers no more numbness, \par Less pain in left buttock pain from sleep, less 4-5th digits numbness on left foot.\par \par no seizures for many years. \par no szs, no ADRs on /150mg low dose.\par \par On ROS: +/-sleeping OK,  and she has not intentionally lost weight.  Mood is good.  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  She had asystole documented in 2008 and had pacemaker placed at that time, with prior h/o afib.  Describes prior spells of flushing sensation from head to toe with fatigue a few yrs ago.\par She has had no seizures in 5 years, and is driving now.  She is working and is . \par \par She thinks that she has had about three convulsions in her lifetime and cannot count the partial ones, but has not had one in many years.  \par \par Father had seizures after head trauma, and there are no other risk factors.

## 2019-06-07 NOTE — DISCUSSION/SUMMARY
[Well-controlled] : well-controlled [Secondary Generalization] : secondary generalization [Idiopathic] : idiopathic  [Focal] : focal [Safety Recommendations] : The patient was advised in regards to the risk of seizures and general seizure safety recommendations including not to be bathing alone, climbing to high places and operating heavy machinery. [Medication Side Effects] : High frequency and serious potential medication adverse effects were reviewed with the patient, including but not exclusive to psychiatric effects.  Information sheets on medication side effects were made available to the patient in our clinic.  The patient or advocate agrees to notify us for any concerns. [Sleep Hygiene/Sleep Disruption Risks] : Sleep hygiene and the risks of sleep disruption were discussed. 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    \par H/o syncope, s/p PPM.\par \par s/p surgery for cervical myelopathy/stenosis, max at C4-C5.  some hand numbness intermittently on left pre-op, improved.  less complaints of dysesthesia of the ear (numbness in the lower half of the ear/lobe, and behind mandible in neck in C2/3 distribution to pinprick).  Ear complaints may be of high superficial cervical plexus etiology (C2-C3 greater auricular n.)\par L5-S1 likely causitive of left buttock pain, sciatica, and foot dysesthesias. \par \par Recent sustained persistent HA lasting for days, now resolved.  CT CTV CTA and exam reassurring. \par \par PT for neck stiffness\par discussed risk/benefit of trial of triptan.  some risk of arrhythmia, but discussed with cardiology and pt has a PPM.  s/p Rx imitrex 100mg for status migrainosis trial, prn naproxen ok.\par Cont /d\par

## 2019-06-07 NOTE — REVIEW OF SYSTEMS
[Arthralgias] : arthralgias [As Noted in HPI] : as noted in HPI [Negative] : Heme/Lymph [de-identified] : She is complaining of loss of taste without loss of smell.   [FreeTextEntry9] : right arm pain

## 2019-06-07 NOTE — DATA REVIEWED
[de-identified] :  EEG showed left temporal spikes in past [de-identified] : 4/2015 CTs have been normal of head, and she cannot have MRI and did not have one prior to the pacemaker.  \par 2016 CT moderate canal stenosis, cord flattening.\par 2019 CT C4-5 central left disc with compression. C5-6, C6-7 root issues.  T11-12 right disc with root compression, L5-S1 disc and root compression. [de-identified] : Lamictal level was 6.3 in July 2013 on 300 mg per day but dose decreased to 250 for the day due to side effects.  Previous level was 4.1 on 200 mg per day.  LTG 5.2 2016

## 2019-06-10 ENCOUNTER — APPOINTMENT (OUTPATIENT)
Dept: ORTHOPEDIC SURGERY | Facility: CLINIC | Age: 59
End: 2019-06-10
Payer: COMMERCIAL

## 2019-06-10 VITALS — HEIGHT: 65 IN | BODY MASS INDEX: 24.83 KG/M2 | WEIGHT: 149 LBS

## 2019-06-10 DIAGNOSIS — G95.9 DISEASE OF SPINAL CORD, UNSPECIFIED: ICD-10-CM

## 2019-06-10 PROCEDURE — 72040 X-RAY EXAM NECK SPINE 2-3 VW: CPT

## 2019-06-10 PROCEDURE — 99213 OFFICE O/P EST LOW 20 MIN: CPT

## 2019-06-13 LAB
ALBUMIN SERPL ELPH-MCNC: 4.6 G/DL
ALP BLD-CCNC: 90 U/L
ALT SERPL-CCNC: 16 U/L
ANION GAP SERPL CALC-SCNC: 16 MMOL/L
AST SERPL-CCNC: 23 U/L
BASOPHILS # BLD AUTO: 0.04 K/UL
BASOPHILS NFR BLD AUTO: 0.7 %
BILIRUB SERPL-MCNC: 0.2 MG/DL
BUN SERPL-MCNC: 19 MG/DL
CALCIUM SERPL-MCNC: 9.9 MG/DL
CHLORIDE SERPL-SCNC: 105 MMOL/L
CO2 SERPL-SCNC: 21 MMOL/L
CREAT SERPL-MCNC: 0.89 MG/DL
EOSINOPHIL # BLD AUTO: 0.12 K/UL
EOSINOPHIL NFR BLD AUTO: 2 %
GLUCOSE SERPL-MCNC: 98 MG/DL
HCT VFR BLD CALC: 35.2 %
HGB BLD-MCNC: 10.6 G/DL
IMM GRANULOCYTES NFR BLD AUTO: 0.2 %
LYMPHOCYTES # BLD AUTO: 1.67 K/UL
LYMPHOCYTES NFR BLD AUTO: 27.3 %
MAN DIFF?: NORMAL
MCHC RBC-ENTMCNC: 28.1 PG
MCHC RBC-ENTMCNC: 30.1 GM/DL
MCV RBC AUTO: 93.4 FL
MONOCYTES # BLD AUTO: 0.51 K/UL
MONOCYTES NFR BLD AUTO: 8.3 %
NEUTROPHILS # BLD AUTO: 3.77 K/UL
NEUTROPHILS NFR BLD AUTO: 61.5 %
PLATELET # BLD AUTO: 311 K/UL
POTASSIUM SERPL-SCNC: 4.7 MMOL/L
PROT SERPL-MCNC: 6.5 G/DL
RBC # BLD: 3.77 M/UL
RBC # FLD: 13.7 %
SODIUM SERPL-SCNC: 142 MMOL/L
TSH SERPL-ACNC: 1.31 UIU/ML
WBC # FLD AUTO: 6.12 K/UL

## 2019-06-14 ENCOUNTER — RESULT REVIEW (OUTPATIENT)
Age: 59
End: 2019-06-14

## 2019-06-24 LAB
FERRITIN SERPL-MCNC: 16 NG/ML
IRON SATN MFR SERPL: 19 %
IRON SERPL-MCNC: 62 UG/DL
TIBC SERPL-MCNC: 332 UG/DL
UIBC SERPL-MCNC: 270 UG/DL
VIT B12 SERPL-MCNC: 771 PG/ML

## 2019-06-25 ENCOUNTER — RESULT REVIEW (OUTPATIENT)
Age: 59
End: 2019-06-25

## 2019-07-12 ENCOUNTER — FORM ENCOUNTER (OUTPATIENT)
Age: 59
End: 2019-07-12

## 2019-07-13 ENCOUNTER — OUTPATIENT (OUTPATIENT)
Dept: OUTPATIENT SERVICES | Facility: HOSPITAL | Age: 59
LOS: 1 days | End: 2019-07-13
Payer: COMMERCIAL

## 2019-07-13 ENCOUNTER — APPOINTMENT (OUTPATIENT)
Dept: ULTRASOUND IMAGING | Facility: CLINIC | Age: 59
End: 2019-07-13
Payer: COMMERCIAL

## 2019-07-13 DIAGNOSIS — Z98.890 OTHER SPECIFIED POSTPROCEDURAL STATES: Chronic | ICD-10-CM

## 2019-07-13 DIAGNOSIS — R60.0 LOCALIZED EDEMA: ICD-10-CM

## 2019-07-13 PROCEDURE — 93970 EXTREMITY STUDY: CPT | Mod: 26

## 2019-07-13 PROCEDURE — 93970 EXTREMITY STUDY: CPT

## 2019-07-16 DIAGNOSIS — R60.0 LOCALIZED EDEMA: ICD-10-CM

## 2019-07-21 ENCOUNTER — FORM ENCOUNTER (OUTPATIENT)
Age: 59
End: 2019-07-21

## 2019-07-22 ENCOUNTER — APPOINTMENT (OUTPATIENT)
Dept: ULTRASOUND IMAGING | Facility: CLINIC | Age: 59
End: 2019-07-22
Payer: COMMERCIAL

## 2019-07-22 ENCOUNTER — OUTPATIENT (OUTPATIENT)
Dept: OUTPATIENT SERVICES | Facility: HOSPITAL | Age: 59
LOS: 1 days | End: 2019-07-22
Payer: COMMERCIAL

## 2019-07-22 DIAGNOSIS — Z00.8 ENCOUNTER FOR OTHER GENERAL EXAMINATION: ICD-10-CM

## 2019-07-22 DIAGNOSIS — Z98.890 OTHER SPECIFIED POSTPROCEDURAL STATES: Chronic | ICD-10-CM

## 2019-07-22 PROCEDURE — 93925 LOWER EXTREMITY STUDY: CPT | Mod: 26

## 2019-07-22 PROCEDURE — 93925 LOWER EXTREMITY STUDY: CPT

## 2019-07-25 ENCOUNTER — FORM ENCOUNTER (OUTPATIENT)
Age: 59
End: 2019-07-25

## 2019-07-26 ENCOUNTER — APPOINTMENT (OUTPATIENT)
Dept: ULTRASOUND IMAGING | Facility: CLINIC | Age: 59
End: 2019-07-26
Payer: COMMERCIAL

## 2019-07-26 ENCOUNTER — OUTPATIENT (OUTPATIENT)
Dept: OUTPATIENT SERVICES | Facility: HOSPITAL | Age: 59
LOS: 1 days | End: 2019-07-26
Payer: COMMERCIAL

## 2019-07-26 ENCOUNTER — APPOINTMENT (OUTPATIENT)
Dept: ORTHOPEDIC SURGERY | Facility: CLINIC | Age: 59
End: 2019-07-26
Payer: COMMERCIAL

## 2019-07-26 VITALS
HEART RATE: 66 BPM | DIASTOLIC BLOOD PRESSURE: 73 MMHG | HEIGHT: 65 IN | SYSTOLIC BLOOD PRESSURE: 122 MMHG | WEIGHT: 148 LBS | BODY MASS INDEX: 24.66 KG/M2

## 2019-07-26 DIAGNOSIS — Z86.79 PERSONAL HISTORY OF OTHER DISEASES OF THE CIRCULATORY SYSTEM: ICD-10-CM

## 2019-07-26 DIAGNOSIS — Z00.8 ENCOUNTER FOR OTHER GENERAL EXAMINATION: ICD-10-CM

## 2019-07-26 DIAGNOSIS — Z98.890 OTHER SPECIFIED POSTPROCEDURAL STATES: Chronic | ICD-10-CM

## 2019-07-26 PROCEDURE — 76882 US LMTD JT/FCL EVL NVASC XTR: CPT | Mod: 26,LT

## 2019-07-26 PROCEDURE — 99213 OFFICE O/P EST LOW 20 MIN: CPT

## 2019-07-26 PROCEDURE — 73130 X-RAY EXAM OF HAND: CPT | Mod: LT

## 2019-07-26 PROCEDURE — 76882 US LMTD JT/FCL EVL NVASC XTR: CPT

## 2019-07-26 NOTE — DISCUSSION/SUMMARY
[FreeTextEntry1] : She has findings consistent with a left ring finger MCP joint radial collateral ligament injury.\par \par I had a discussion regarding today's visit, the diagnosis, and treatment options / recommendations.  I recommended an MRI.  Because of her pacemaker, she cannot get an MRI.  I therefore ordered an ultrasound.  She does understand that an ultrasound may be a determinate for this injury, but I would like to see what it demonstrates.  In the meantime, she will alea tape the middle and ring fingers.  She will followup after the ultrasound to review the results and discuss treatment recommendations.\par \par The patient has agreed to this plan of management and has expressed full understanding.  All questions were fully answered to the patient's satisfaction.\par \par Over 50% of the time spent with the patient was on counseling the patient on the above diagnosis, treatment plan and prognosis.

## 2019-07-26 NOTE — PHYSICAL EXAM
[de-identified] : - Constitutional: This is a female in no obvious distress.  \par - Psych: Patient is alert and oriented to person, place and time.  Patient has a normal mood and affect.\par - Cardiovascular: Normal pulses throughout the upper extremities.  No significant varicosities are noted in the upper extremities. \par - Neuro: Strength and sensation are intact throughout the upper extremities.  Patient has normal coordination.\par - Respiratory:  Patient exhibits no evidence of shortness of breath or difficulty breathing.\par - Skin: No rashes, lesions, or other abnormalities are noted in the upper extremities.\par \par ---\par \par Examination of her left hand demonstrates no focal swelling.  She is tender along the ring finger MCP joint radially.  She has pain with stress testing of the ring finger MCP joint radial collateral ligament. The ring finger appears ulnar deviated at the MCP joint.   There appears to be some instability.  There is no instability along the middle finger MCP joint collateral ligaments.  She has full flexion and extension.  She is neurovascularly intact distally. [de-identified] : AP, lateral, and oblique radiographs of her left hand, demonstrate no obvious fractures or dislocations.

## 2019-07-26 NOTE — HISTORY OF PRESENT ILLNESS
[Right] : right hand dominant [FreeTextEntry1] : She comes in today for evaluation of an injury to her left hand.  She injured her left middle and ring fingers after a tote bad was falling down her arm one month ago.  She has noted pain and loss of strength since then.  She has not noted improvement.\par \par - She has a number of medical conditions and she has a pacemaker for a cardiac condition.

## 2019-07-29 ENCOUNTER — APPOINTMENT (OUTPATIENT)
Dept: ORTHOPEDIC SURGERY | Facility: CLINIC | Age: 59
End: 2019-07-29

## 2019-07-31 ENCOUNTER — APPOINTMENT (OUTPATIENT)
Dept: ORTHOPEDIC SURGERY | Facility: CLINIC | Age: 59
End: 2019-07-31
Payer: COMMERCIAL

## 2019-07-31 PROCEDURE — 99214 OFFICE O/P EST MOD 30 MIN: CPT

## 2019-07-31 NOTE — DISCUSSION/SUMMARY
[FreeTextEntry1] : I reviewed the ultrasound results with her.  Further treatment options / recommendations were discussed.  I explained to her that if the palm the ultrasound, I cannot fully assess the integrity of her ring finger radial collateral ligament.  It is possible that all of her symptoms could be secondary to a ring finger radial sagittal band injury, but it is also possible that she also sustained an injury to the ring finger MCP joint collateral ligament.  After discussion of treatment options/recommendations, I have recommended a trial of splinting of the ring finger MCP joint in extension, to treat the sagittal band injury.  Again, she understands that this may not be successful.  She understands it is possible that if this does not successfully treated her symptoms, then surgery may be indicated.  However, based upon her examination and the results of the ultrasound, I would not recommend surgery at this time.  She has agreed to proceed with this plan.  She will be fitted with a splint and will followup in 3-4 weeks to assess her progress. \par \par I had a discussion regarding today's visit, the diagnosis, and my treatment recommendations.  The patient has agreed to this plan of management and has expressed full understanding.  All questions were fully answered to the patient's satisfaction.\par \par I spent at least 25 minutes of face-to-face time with the patient.  Over 50% of this time was spent on counseling the patient on the above diagnosis, treatment plan and prognosis.

## 2019-07-31 NOTE — PHYSICAL EXAM
[de-identified] : - Constitutional: This is a female in no obvious distress.  \par - Psych: Patient is alert and oriented to person, place and time.  Patient has a normal mood and affect.\par - Cardiovascular: Normal pulses throughout the upper extremities.  No significant varicosities are noted in the upper extremities. \par - Neuro: Strength and sensation are intact throughout the upper extremities.  Patient has normal coordination.\par - Respiratory:  Patient exhibits no evidence of shortness of breath or difficulty breathing.\par - Skin: No rashes, lesions, or other abnormalities are noted in the upper extremities.\par \par ---\par \par Examination of her left hand demonstrates no focal swelling.  She is tender along the ring finger MCP joint radially.  She has pain with stress testing of the ring finger MCP joint radial collateral ligament. The ring finger appears ulnar deviated at the MCP joint.   Instability of the ring finger radial collateral ligament is indeterminate today.  There may be slight ulnar subluxation of the ring finger extensor tendon with flexion of the digit.   There is no instability along the middle finger MCP joint collateral ligaments.  She has full flexion and extension.  She is neurovascularly intact distally. [de-identified] : I reviewed the ultrasound of her left hand.  This demonstrated Edema along the dorsal radial soft tissues of the ring finger MCP joint concerning for a capsular sprain.  Evaluation of the collateral ligaments was limited.  There was hypoechoic  echotexture O. the radial sagittal band concerning for a sprain.  There is a questionable slight radial subluxation of the extensor tendon.  There was a hyperechoic focus along the palmar base of the proximal phalanx, which could potentially represent a small avulsion fracture.

## 2019-07-31 NOTE — HISTORY OF PRESENT ILLNESS
[FreeTextEntry1] : Greater than one month status post left ring finger MCP joint radial collateral ligament injury.  See note from when she was seen in the office 5 days ago.  I ordered an ultrasound.  She comes in to review the results and discuss treatment recommendations.\par \par - She has a number of medical conditions and she has a pacemaker for a cardiac condition.

## 2019-08-21 ENCOUNTER — APPOINTMENT (OUTPATIENT)
Dept: ORTHOPEDIC SURGERY | Facility: CLINIC | Age: 59
End: 2019-08-21
Payer: COMMERCIAL

## 2019-08-21 PROCEDURE — 99213 OFFICE O/P EST LOW 20 MIN: CPT

## 2019-08-21 NOTE — PHYSICAL EXAM
[de-identified] : - Constitutional: This is a female in no obvious distress.  \par - Psych: Patient is alert and oriented to person, place and time.  Patient has a normal mood and affect.\par - Cardiovascular: Normal pulses throughout the upper extremities.  No significant varicosities are noted in the upper extremities. \par - Neuro: Strength and sensation are intact throughout the upper extremities.  Patient has normal coordination.\par - Respiratory:  Patient exhibits no evidence of shortness of breath or difficulty breathing.\par - Skin: No rashes, lesions, or other abnormalities are noted in the upper extremities.\par \par ---\par \par Examination of her left hand demonstrates no focal swelling.  She is tender along the ring finger MCP joint radially.  She has pain with stress testing of the ring finger MCP joint radial collateral ligament.  But there is associated pain there is no obvious instability to stress testing of the MCP joint radial collateral ligament.  There appears to be slight ulnar subluxation of the ring finger extensor tendon with flexion of the digit.   There is no instability along the middle finger MCP joint collateral ligaments.  She has full flexion and extension.  She is neurovascularly intact distally. [de-identified] : An ultrasound of her left hand demonstrated Edema along the dorsal radial soft tissues of the ring finger MCP joint concerning for a capsular sprain.  Evaluation of the collateral ligaments was limited.  There was hypoechoic  echotexture O. the radial sagittal band concerning for a sprain.  There is a questionable slight radial subluxation of the extensor tendon.  There was a hyperechoic focus along the palmar base of the proximal phalanx, which could potentially represent a small avulsion fracture.

## 2019-08-21 NOTE — HISTORY OF PRESENT ILLNESS
[FreeTextEntry1] : Follow-up regarding left ring finger injury.  See note from when she was seen in the office 3 weeks ago.  She was splinted for a presumed radial sagittal band injury.  She states that she has not noted any improvement with the splint.\par \par - She has a number of medical conditions and she has a pacemaker for a cardiac condition.

## 2019-08-21 NOTE — DISCUSSION/SUMMARY
[FreeTextEntry1] : Further treatment options / recommendations were discussed.  I did tell her that I am not certain that her symptoms will improve with further splinting and time.  Based upon her examination, it appears that the injury is more likely a rupture of the radial sagittal band, but she may have also injured the radial collateral ligament.  She would like to give this for the time to see if she improves.  She will continue to wear the MCP joint extension splint and will follow-up in 1 month.  If she is not further improved, then we will discuss potential surgical intervention at this time if warranted and if she desires.\par \par I had a discussion regarding today's visit, the diagnosis, and my treatment recommendations.  The patient has agreed to the above plan of management and has expressed full understanding.  All questions were fully answered to the patient's satisfaction.\par \par I spent at least 25 minutes of face-to-face time with the patient.  Over 50% of this time was spent on counseling the patient on the above diagnosis, treatment plan and prognosis.

## 2019-09-20 PROBLEM — S63.655A: Status: ACTIVE | Noted: 2019-07-26

## 2019-09-25 ENCOUNTER — APPOINTMENT (OUTPATIENT)
Dept: ORTHOPEDIC SURGERY | Facility: CLINIC | Age: 59
End: 2019-09-25
Payer: COMMERCIAL

## 2019-09-25 DIAGNOSIS — S63.655A SPRAIN OF METACARPOPHALANGEAL JOINT OF LEFT RING FINGER, INITIAL ENCOUNTER: ICD-10-CM

## 2019-09-25 PROCEDURE — 99214 OFFICE O/P EST MOD 30 MIN: CPT

## 2019-09-25 NOTE — PHYSICAL EXAM
[de-identified] : - Constitutional: This is a female in no obvious distress.  \par - Psych: Patient is alert and oriented to person, place and time.  Patient has a normal mood and affect.\par - Cardiovascular: Normal pulses throughout the upper extremities.  No significant varicosities are noted in the upper extremities. \par - Neuro: Strength and sensation are intact throughout the upper extremities.  Patient has normal coordination.\par - Respiratory:  Patient exhibits no evidence of shortness of breath or difficulty breathing.\par - Skin: No rashes, lesions, or other abnormalities are noted in the upper extremities.\par \par ---\par \par Examination of her left hand demonstrates no focal swelling.  There is no obvious residual tenderness along the ring finger MCP joint radially.  She has mild pain with stress testing of the ring finger MCP joint radial collateral ligament.  There is no instability to stress testing of the MCP joint radial collateral ligament.  There appears to be possibly slight ulnar subluxation of the ring finger extensor tendon with flexion of the digit, but this is indeterminate.  There is no instability along the middle finger MCP joint collateral ligaments.  She has full flexion and extension.  She is neurovascularly intact distally. [de-identified] : An ultrasound of her left hand demonstrated Edema along the dorsal radial soft tissues of the ring finger MCP joint concerning for a capsular sprain.  Evaluation of the collateral ligaments was limited.  There was hypoechoic  echotexture O. the radial sagittal band concerning for a sprain.  There is a questionable slight radial subluxation of the extensor tendon.  There was a hyperechoic focus along the palmar base of the proximal phalanx, which could potentially represent a small avulsion fracture.

## 2019-09-25 NOTE — DISCUSSION/SUMMARY
[FreeTextEntry1] : Further treatment recommendations / options were discussed.  At this time, as she is improved, I recommended discontinuing the splint and gentle range of motion exercises.  She will avoid heavy lifting and grasping and strenuous activities.  She will follow-up in 1 month.  If her symptoms change or worsen before then, then she was instructed to call the office.\par \par I had a discussion regarding today's visit, the diagnosis, and my treatment recommendations.  The patient has agreed to the above plan of management and has expressed full understanding.  All questions were fully answered to the patient's satisfaction.\par \par I spent at least 25 minutes of face-to-face time with the patient.  Over 50% of this time was spent on counseling the patient on the above diagnosis, treatment plan and prognosis.

## 2019-09-25 NOTE — HISTORY OF PRESENT ILLNESS
[FreeTextEntry1] : Follow-up regarding left ring finger injury.  See note from when she was seen in the office 5 weeks ago.  She was splinted for a presumed radial sagittal band injury.  She is still having some pain but is improved.\par \par - She has a number of medical conditions and she has a pacemaker for a cardiac condition.

## 2019-10-07 ENCOUNTER — RX RENEWAL (OUTPATIENT)
Age: 59
End: 2019-10-07

## 2019-10-22 ENCOUNTER — RESULT CHARGE (OUTPATIENT)
Age: 59
End: 2019-10-22

## 2019-10-22 ENCOUNTER — APPOINTMENT (OUTPATIENT)
Dept: CARDIOLOGY | Facility: CLINIC | Age: 59
End: 2019-10-22
Payer: COMMERCIAL

## 2019-10-22 ENCOUNTER — NON-APPOINTMENT (OUTPATIENT)
Age: 59
End: 2019-10-22

## 2019-10-22 VITALS
WEIGHT: 149 LBS | OXYGEN SATURATION: 98 % | SYSTOLIC BLOOD PRESSURE: 125 MMHG | DIASTOLIC BLOOD PRESSURE: 76 MMHG | HEART RATE: 65 BPM | HEIGHT: 65 IN | BODY MASS INDEX: 24.83 KG/M2

## 2019-10-22 PROCEDURE — 93000 ELECTROCARDIOGRAM COMPLETE: CPT

## 2019-10-22 PROCEDURE — 99213 OFFICE O/P EST LOW 20 MIN: CPT

## 2019-10-22 RX ORDER — FUROSEMIDE 20 MG/1
20 TABLET ORAL
Qty: 30 | Refills: 0 | Status: DISCONTINUED | COMMUNITY
Start: 2019-07-16 | End: 2019-10-22

## 2019-10-25 ENCOUNTER — TRANSCRIPTION ENCOUNTER (OUTPATIENT)
Age: 59
End: 2019-10-25

## 2019-10-30 ENCOUNTER — NON-APPOINTMENT (OUTPATIENT)
Age: 59
End: 2019-10-30

## 2019-10-30 ENCOUNTER — APPOINTMENT (OUTPATIENT)
Dept: INTERNAL MEDICINE | Facility: CLINIC | Age: 59
End: 2019-10-30
Payer: COMMERCIAL

## 2019-10-30 VITALS
SYSTOLIC BLOOD PRESSURE: 100 MMHG | BODY MASS INDEX: 24.83 KG/M2 | DIASTOLIC BLOOD PRESSURE: 60 MMHG | WEIGHT: 149 LBS | HEIGHT: 65 IN | OXYGEN SATURATION: 97 % | TEMPERATURE: 98.7 F | HEART RATE: 72 BPM

## 2019-10-30 PROCEDURE — 36415 COLL VENOUS BLD VENIPUNCTURE: CPT

## 2019-10-30 PROCEDURE — 99396 PREV VISIT EST AGE 40-64: CPT | Mod: 25

## 2019-10-30 NOTE — HISTORY OF PRESENT ILLNESS
[FreeTextEntry1] : 57 y/o female present for a physical exam.  [de-identified] : Pt presents to the office today for CPE and FBW\par She has been feeling well overall\par Her daughter is pregnant and doing well\par She has recently seen Dr Francois \par She still gets intermittent swelling of LE\par Exercise has been poor \par Diet is doing well\par Had her Flu vaccination at work.

## 2019-10-30 NOTE — REVIEW OF SYSTEMS
[Lower Ext Edema] : lower extremity edema [Negative] : Heme/Lymph [FreeTextEntry5] : intermittent LE edema

## 2019-10-30 NOTE — PLAN
[FreeTextEntry1] : FBW done in office today\par EKG done but was just done last week at cardiology.  no changes\par She will try to improve with her exercise\par She will continue with healthy diet\par She will continue with current medications\par She will be advised of BW results

## 2019-10-31 ENCOUNTER — TRANSCRIPTION ENCOUNTER (OUTPATIENT)
Age: 59
End: 2019-10-31

## 2019-11-07 ENCOUNTER — TRANSCRIPTION ENCOUNTER (OUTPATIENT)
Age: 59
End: 2019-11-07

## 2019-11-07 LAB
ALBUMIN SERPL ELPH-MCNC: 4.6 G/DL
ALP BLD-CCNC: 88 U/L
ALT SERPL-CCNC: 10 U/L
ANION GAP SERPL CALC-SCNC: 13 MMOL/L
APPEARANCE: CLEAR
AST SERPL-CCNC: 19 U/L
BACTERIA: NEGATIVE
BASOPHILS # BLD AUTO: 0.04 K/UL
BASOPHILS NFR BLD AUTO: 0.9 %
BILIRUB SERPL-MCNC: 0.2 MG/DL
BILIRUBIN URINE: NEGATIVE
BLOOD URINE: NEGATIVE
BUN SERPL-MCNC: 11 MG/DL
CALCIUM SERPL-MCNC: 9.8 MG/DL
CHLORIDE SERPL-SCNC: 105 MMOL/L
CHOLEST SERPL-MCNC: 170 MG/DL
CHOLEST/HDLC SERPL: 2.2 RATIO
CO2 SERPL-SCNC: 26 MMOL/L
COLOR: YELLOW
CREAT SERPL-MCNC: 0.82 MG/DL
EOSINOPHIL # BLD AUTO: 0.07 K/UL
EOSINOPHIL NFR BLD AUTO: 1.6 %
ESTIMATED AVERAGE GLUCOSE: 111 MG/DL
GLUCOSE QUALITATIVE U: NEGATIVE
GLUCOSE SERPL-MCNC: 80 MG/DL
HBA1C MFR BLD HPLC: 5.5 %
HCT VFR BLD CALC: 38.3 %
HDLC SERPL-MCNC: 79 MG/DL
HGB BLD-MCNC: 11.8 G/DL
HYALINE CASTS: 2 /LPF
IMM GRANULOCYTES NFR BLD AUTO: 0.2 %
KETONES URINE: NEGATIVE
LDLC SERPL CALC-MCNC: 78 MG/DL
LEUKOCYTE ESTERASE URINE: ABNORMAL
LYMPHOCYTES # BLD AUTO: 1.3 K/UL
LYMPHOCYTES NFR BLD AUTO: 28.9 %
MAN DIFF?: NORMAL
MCHC RBC-ENTMCNC: 29.1 PG
MCHC RBC-ENTMCNC: 30.8 GM/DL
MCV RBC AUTO: 94.6 FL
MICROSCOPIC-UA: NORMAL
MONOCYTES # BLD AUTO: 0.36 K/UL
MONOCYTES NFR BLD AUTO: 8 %
NEUTROPHILS # BLD AUTO: 2.72 K/UL
NEUTROPHILS NFR BLD AUTO: 60.4 %
NITRITE URINE: NEGATIVE
PH URINE: 6
PLATELET # BLD AUTO: 240 K/UL
POTASSIUM SERPL-SCNC: 4.5 MMOL/L
PROT SERPL-MCNC: 6.7 G/DL
PROTEIN URINE: NORMAL
RBC # BLD: 4.05 M/UL
RBC # FLD: 13.6 %
RED BLOOD CELLS URINE: 3 /HPF
SAVE SPECIMEN: NORMAL
SODIUM SERPL-SCNC: 144 MMOL/L
SPECIFIC GRAVITY URINE: 1.02
SQUAMOUS EPITHELIAL CELLS: 1 /HPF
TRIGL SERPL-MCNC: 67 MG/DL
TSH SERPL-ACNC: 1.59 UIU/ML
UROBILINOGEN URINE: NORMAL
WBC # FLD AUTO: 4.5 K/UL
WHITE BLOOD CELLS URINE: 14 /HPF

## 2019-11-25 ENCOUNTER — APPOINTMENT (OUTPATIENT)
Dept: NEUROLOGY | Facility: CLINIC | Age: 59
End: 2019-11-25
Payer: COMMERCIAL

## 2019-11-25 VITALS
HEIGHT: 65 IN | HEART RATE: 64 BPM | WEIGHT: 148 LBS | SYSTOLIC BLOOD PRESSURE: 103 MMHG | BODY MASS INDEX: 24.66 KG/M2 | DIASTOLIC BLOOD PRESSURE: 57 MMHG

## 2019-11-25 PROCEDURE — 99213 OFFICE O/P EST LOW 20 MIN: CPT

## 2019-11-25 RX ORDER — LAMOTRIGINE 150 MG/1
150 TABLET ORAL
Refills: 0 | Status: DISCONTINUED | COMMUNITY
End: 2019-11-25

## 2019-11-25 NOTE — PHYSICAL EXAM
[General Appearance - Alert] : alert [General Appearance - In No Acute Distress] : in no acute distress [Oriented To Time, Place, And Person] : oriented to person, place, and time [Impaired Insight] : insight and judgment were intact [Affect] : the affect was normal [Person] : oriented to person [Place] : oriented to place [Time] : oriented to time [Short Term Intact] : short term memory intact [Remote Intact] : remote memory intact [Registration Intact] : recent registration memory intact [Span Intact] : the attention span was normal [Concentration Intact] : normal concentrating ability [Visual Intact] : visual attention was ~T not ~L decreased [Naming Objects] : no difficulty naming common objects [Repeating Phrases] : no difficulty repeating a phrase [Writing A Sentence] : no difficulty writing a sentence [Comprehension] : comprehension intact [Fluency] : fluency intact [Reading] : reading intact [Current Events] : adequate knowledge of current events [Past History] : adequate knowledge of personal past history [Cranial Nerves Oculomotor (III)] : extraocular motion intact [Vocabulary] : adequate range of vocabulary [Cranial Nerves Optic (II)] : visual acuity intact bilaterally,  visual fields full to confrontation, pupils equal round and reactive to light [Cranial Nerves Facial (VII)] : face symmetrical [Cranial Nerves Trigeminal (V)] : facial sensation intact symmetrically [Cranial Nerves Accessory (XI - Cranial And Spinal)] : head turning and shoulder shrug symmetric [Cranial Nerves Glossopharyngeal (IX)] : tongue and palate midline [Motor Tone] : muscle tone was normal in all four extremities [Cranial Nerves Hypoglossal (XII)] : there was no tongue deviation with protrusion [Motor Strength] : muscle strength was normal in all four extremities [No Muscle Atrophy] : normal bulk in all four extremities [Motor Handedness Right-Handed] : the patient is right hand dominant [Sensation Tactile Decrease] : light touch was intact [Sensation Pain / Temperature Decrease] : pain and temperature was intact [Abnormal Walk] : normal gait [Past-pointing] : there was no past-pointing [Balance] : balance was intact [Tremor] : no tremor present [3+] : Patella left 3+ [Plantar Reflex Left Only] : normal on the left [2+] : Ankle jerk left 2+ [Plantar Reflex Right Only] : normal on the right [___] : absent on the right [___] : absent on the left [FreeTextEntry5] : hearing intact bilat.  left ear numb to PP left lobe and post auricular/post mandibular area.  [FreeTextEntry6] : mild ABP weakness, and atrophy of thenar eminence [Sclera] : the sclera and conjunctiva were normal [PERRL With Normal Accommodation] : pupils were equal in size, round, reactive to light, with normal accommodation [Extraocular Movements] : extraocular movements were intact [Outer Ear] : the ears and nose were normal in appearance [Oropharynx] : the oropharynx was normal [Neck Appearance] : the appearance of the neck was normal [Neck Cervical Mass (___cm)] : no neck mass was observed [Jugular Venous Distention Increased] : there was no jugular-venous distention [Thyroid Diffuse Enlargement] : the thyroid was not enlarged [Thyroid Nodule] : there were no palpable thyroid nodules [Auscultation Breath Sounds / Voice Sounds] : lungs were clear to auscultation bilaterally [Heart Sounds] : normal S1 and S2 [Heart Rate And Rhythm] : heart rate was normal and rhythm regular [Heart Sounds Gallop] : no gallops [Murmurs] : no murmurs [Bowel Sounds] : normal bowel sounds [Heart Sounds Pericardial Friction Rub] : no pericardial rub [Nail Clubbing] : no clubbing  or cyanosis of the fingernails [] : no rash [Skin Lesions] : no skin lesions

## 2019-11-25 NOTE — DATA REVIEWED
[de-identified] : 4/2015 CTs have been normal of head, and she cannot have MRI and did not have one prior to the pacemaker.  \par 2016 CT moderate canal stenosis, cord flattening.\par 2019 CT C4-5 central left disc with compression. C5-6, C6-7 root issues.  T11-12 right disc with root compression, L5-S1 disc and root compression. [de-identified] :  EEG showed left temporal spikes in past [de-identified] : Lamictal level was 6.3 in July 2013 on 300 mg per day but dose decreased to 250 for the day due to side effects.  Previous level was 4.1 on 200 mg per day.  LTG 5.2 2016

## 2019-11-25 NOTE — DISCUSSION/SUMMARY
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    \par H/o syncope, s/p PPM.\par \par s/p 2019 surgery for cervical myelopathy/stenosis, max at C4-C5.  some hand numbness intermittently on left pre-op, improved.  Improved dysesthesia of the ear (numbness in the lower half of the ear/lobe, and behind mandible in neck in C2/3 distribution to pinprick), may have been of high superficial cervical plexus etiology (C2-C3 greater auricular n.). Less numbness in hands, spasm.\par L5-S1 likely causative of left buttock pain, sciatica, and foot dysesthesias. \par \par Recent sustained persistent HA lasting for days, now resolved.  CT CTV CTA and exam reassuring. \par Cont /d\par  [Secondary Generalization] : secondary generalization [Idiopathic] : idiopathic  [Medication Side Effects] : High frequency and serious potential medication adverse effects were reviewed with the patient, including but not exclusive to psychiatric effects.  Information sheets on medication side effects were made available to the patient in our clinic.  The patient or advocate agrees to notify us for any concerns. [Focal] : focal [Safety Recommendations] : The patient was advised in regards to the risk of seizures and general seizure safety recommendations including not to be bathing alone, climbing to high places and operating heavy machinery. [Sleep Hygiene/Sleep Disruption Risks] : Sleep hygiene and the risks of sleep disruption were discussed.

## 2019-11-25 NOTE — REVIEW OF SYSTEMS
[As Noted in HPI] : as noted in HPI [Arthralgias] : arthralgias [Negative] : Heme/Lymph [de-identified] : She is complaining of loss of taste without loss of smell.   [FreeTextEntry9] : right arm pain

## 2019-11-25 NOTE — HISTORY OF PRESENT ILLNESS
[FreeTextEntry1] : /100, Atenolol\par \par Updates:  LUE 3rd digit injury recently.  \par \par HA intermittently with change in weather.  posterior head location, pressure sensation, 7-8/10 initially, improved. a little nausea, no light sensitivity.  HA better with accupuncture, no severe in the last month.\par no sinus or URI issues.\par Denies migraines in past, occ milder HA with muscle tension in back of head/occiput.  using NSAIDs prn now.\par \par s/p cervical neck surgery feb 2019. had neck pain, tension her neck, shoulder blades with head turn still.   no perceived loss of strength in hands or biceps.  no recent radiation of pain, numbness to left ear, throat area, no spasms of face.  numbness of feet better since surgery.  No recent locking up of hands, left 4th 5th fingers no more numbness, \par Less pain in left hip/buttock pain from sleep\par \par no seizures for many years. \par no szs, no ADRs on /150mg low dose.\par \par On ROS: +/-sleeping OK,  and she has not intentionally lost weight.  Mood is good.  some swelling in legs\par \par PMHX: 57 yo RH F with convulsive seizures in 5/2010, 5/2008 x2, and episodes of LOC prior to this since age 44 (unclear if seizures or cardiac).  She had asystole documented in 2008 and had pacemaker placed at that time, with prior h/o afib.  Describes prior spells of flushing sensation from head to toe with fatigue a few yrs ago.\par She has had no seizures in 5 years, and is driving now.  She is working and is . \par \par She thinks that she has had about three convulsions in her lifetime and cannot count the partial ones, but has not had one in many years.  \par \par Father had seizures after head trauma, and there are no other risk factors.

## 2019-12-11 ENCOUNTER — APPOINTMENT (OUTPATIENT)
Dept: ELECTROPHYSIOLOGY | Facility: CLINIC | Age: 59
End: 2019-12-11
Payer: COMMERCIAL

## 2019-12-11 PROCEDURE — 93280 PM DEVICE PROGR EVAL DUAL: CPT

## 2019-12-11 RX ORDER — DIAZEPAM 5 MG/1
5 TABLET ORAL
Refills: 0 | Status: DISCONTINUED | COMMUNITY
End: 2019-12-11

## 2019-12-18 ENCOUNTER — OUTPATIENT (OUTPATIENT)
Dept: OUTPATIENT SERVICES | Facility: HOSPITAL | Age: 59
LOS: 1 days | End: 2019-12-18
Payer: COMMERCIAL

## 2019-12-18 ENCOUNTER — APPOINTMENT (OUTPATIENT)
Dept: MAMMOGRAPHY | Facility: CLINIC | Age: 59
End: 2019-12-18
Payer: COMMERCIAL

## 2019-12-18 ENCOUNTER — APPOINTMENT (OUTPATIENT)
Dept: ULTRASOUND IMAGING | Facility: CLINIC | Age: 59
End: 2019-12-18
Payer: COMMERCIAL

## 2019-12-18 DIAGNOSIS — Z12.31 ENCOUNTER FOR SCREENING MAMMOGRAM FOR MALIGNANT NEOPLASM OF BREAST: ICD-10-CM

## 2019-12-18 DIAGNOSIS — Z98.890 OTHER SPECIFIED POSTPROCEDURAL STATES: Chronic | ICD-10-CM

## 2019-12-18 PROCEDURE — 77067 SCR MAMMO BI INCL CAD: CPT

## 2019-12-18 PROCEDURE — 76641 ULTRASOUND BREAST COMPLETE: CPT | Mod: 26,50

## 2019-12-18 PROCEDURE — 77063 BREAST TOMOSYNTHESIS BI: CPT | Mod: 26

## 2019-12-18 PROCEDURE — 77067 SCR MAMMO BI INCL CAD: CPT | Mod: 26

## 2019-12-18 PROCEDURE — 76641 ULTRASOUND BREAST COMPLETE: CPT

## 2019-12-18 PROCEDURE — 77063 BREAST TOMOSYNTHESIS BI: CPT

## 2019-12-23 ENCOUNTER — TRANSCRIPTION ENCOUNTER (OUTPATIENT)
Age: 59
End: 2019-12-23

## 2020-02-03 ENCOUNTER — APPOINTMENT (OUTPATIENT)
Dept: NEUROLOGY | Facility: CLINIC | Age: 60
End: 2020-02-03

## 2020-02-17 ENCOUNTER — RX RENEWAL (OUTPATIENT)
Age: 60
End: 2020-02-17

## 2020-04-25 ENCOUNTER — MESSAGE (OUTPATIENT)
Age: 60
End: 2020-04-25

## 2020-05-03 ENCOUNTER — TRANSCRIPTION ENCOUNTER (OUTPATIENT)
Age: 60
End: 2020-05-03

## 2020-05-13 ENCOUNTER — APPOINTMENT (OUTPATIENT)
Dept: ORTHOPEDIC SURGERY | Facility: CLINIC | Age: 60
End: 2020-05-13
Payer: COMMERCIAL

## 2020-05-13 VITALS
HEART RATE: 76 BPM | BODY MASS INDEX: 25.49 KG/M2 | HEIGHT: 65 IN | TEMPERATURE: 97.1 F | DIASTOLIC BLOOD PRESSURE: 76 MMHG | WEIGHT: 153 LBS | SYSTOLIC BLOOD PRESSURE: 119 MMHG

## 2020-05-13 DIAGNOSIS — M77.8 OTHER ENTHESOPATHIES, NOT ELSEWHERE CLASSIFIED: ICD-10-CM

## 2020-05-13 DIAGNOSIS — M24.132 OTHER ARTICULAR CARTILAGE DISORDERS, LEFT WRIST: ICD-10-CM

## 2020-05-13 PROCEDURE — 73110 X-RAY EXAM OF WRIST: CPT | Mod: 50

## 2020-05-13 PROCEDURE — 20605 DRAIN/INJ JOINT/BURSA W/O US: CPT | Mod: LT

## 2020-05-13 PROCEDURE — 99214 OFFICE O/P EST MOD 30 MIN: CPT | Mod: 25

## 2020-05-14 ENCOUNTER — APPOINTMENT (OUTPATIENT)
Dept: DISASTER EMERGENCY | Facility: CLINIC | Age: 60
End: 2020-05-14

## 2020-05-14 LAB
SARS-COV-2 IGG SERPL IA-ACNC: 0.1 INDEX
SARS-COV-2 IGG SERPL QL IA: NEGATIVE

## 2020-06-16 ENCOUNTER — RESULT REVIEW (OUTPATIENT)
Age: 60
End: 2020-06-16

## 2020-06-16 ENCOUNTER — APPOINTMENT (OUTPATIENT)
Dept: ELECTROPHYSIOLOGY | Facility: CLINIC | Age: 60
End: 2020-06-16
Payer: COMMERCIAL

## 2020-06-16 VITALS — DIASTOLIC BLOOD PRESSURE: 59 MMHG | SYSTOLIC BLOOD PRESSURE: 97 MMHG | HEART RATE: 76 BPM

## 2020-06-16 PROCEDURE — 93280 PM DEVICE PROGR EVAL DUAL: CPT

## 2020-06-29 ENCOUNTER — TRANSCRIPTION ENCOUNTER (OUTPATIENT)
Age: 60
End: 2020-06-29

## 2020-06-30 ENCOUNTER — EMERGENCY (EMERGENCY)
Facility: HOSPITAL | Age: 60
LOS: 1 days | Discharge: HOME | End: 2020-06-30
Attending: EMERGENCY MEDICINE
Payer: COMMERCIAL

## 2020-06-30 VITALS
SYSTOLIC BLOOD PRESSURE: 153 MMHG | HEIGHT: 65 IN | RESPIRATION RATE: 16 BRPM | DIASTOLIC BLOOD PRESSURE: 75 MMHG | TEMPERATURE: 99 F | WEIGHT: 149.91 LBS | HEART RATE: 66 BPM

## 2020-06-30 VITALS
RESPIRATION RATE: 16 BRPM | TEMPERATURE: 98 F | DIASTOLIC BLOOD PRESSURE: 73 MMHG | OXYGEN SATURATION: 99 % | HEART RATE: 65 BPM | SYSTOLIC BLOOD PRESSURE: 116 MMHG

## 2020-06-30 DIAGNOSIS — Z98.890 OTHER SPECIFIED POSTPROCEDURAL STATES: Chronic | ICD-10-CM

## 2020-06-30 LAB
ALBUMIN SERPL ELPH-MCNC: 4.8 G/DL — SIGNIFICANT CHANGE UP (ref 3.3–5)
ALP SERPL-CCNC: 80 U/L — SIGNIFICANT CHANGE UP (ref 40–120)
ALT FLD-CCNC: 10 U/L — SIGNIFICANT CHANGE UP (ref 10–45)
ANION GAP SERPL CALC-SCNC: 11 MMOL/L — SIGNIFICANT CHANGE UP (ref 5–17)
APTT BLD: 35.2 SEC — SIGNIFICANT CHANGE UP (ref 27.5–35.5)
AST SERPL-CCNC: 23 U/L — SIGNIFICANT CHANGE UP (ref 10–40)
BASOPHILS # BLD AUTO: 0.03 K/UL — SIGNIFICANT CHANGE UP (ref 0–0.2)
BASOPHILS NFR BLD AUTO: 0.5 % — SIGNIFICANT CHANGE UP (ref 0–2)
BILIRUB SERPL-MCNC: 0.1 MG/DL — LOW (ref 0.2–1.2)
BUN SERPL-MCNC: 19 MG/DL — SIGNIFICANT CHANGE UP (ref 7–23)
CALCIUM SERPL-MCNC: 9.8 MG/DL — SIGNIFICANT CHANGE UP (ref 8.4–10.5)
CHLORIDE SERPL-SCNC: 106 MMOL/L — SIGNIFICANT CHANGE UP (ref 96–108)
CO2 SERPL-SCNC: 24 MMOL/L — SIGNIFICANT CHANGE UP (ref 22–31)
CREAT SERPL-MCNC: 1.06 MG/DL — SIGNIFICANT CHANGE UP (ref 0.5–1.3)
EOSINOPHIL # BLD AUTO: 0.09 K/UL — SIGNIFICANT CHANGE UP (ref 0–0.5)
EOSINOPHIL NFR BLD AUTO: 1.5 % — SIGNIFICANT CHANGE UP (ref 0–6)
GLUCOSE SERPL-MCNC: 88 MG/DL — SIGNIFICANT CHANGE UP (ref 70–99)
HCT VFR BLD CALC: 37.3 % — SIGNIFICANT CHANGE UP (ref 34.5–45)
HGB BLD-MCNC: 11.9 G/DL — SIGNIFICANT CHANGE UP (ref 11.5–15.5)
IMM GRANULOCYTES NFR BLD AUTO: 0.3 % — SIGNIFICANT CHANGE UP (ref 0–1.5)
INR BLD: 1.05 RATIO — SIGNIFICANT CHANGE UP (ref 0.88–1.16)
LYMPHOCYTES # BLD AUTO: 1.57 K/UL — SIGNIFICANT CHANGE UP (ref 1–3.3)
LYMPHOCYTES # BLD AUTO: 25.6 % — SIGNIFICANT CHANGE UP (ref 13–44)
MCHC RBC-ENTMCNC: 30 PG — SIGNIFICANT CHANGE UP (ref 27–34)
MCHC RBC-ENTMCNC: 31.9 GM/DL — LOW (ref 32–36)
MCV RBC AUTO: 94 FL — SIGNIFICANT CHANGE UP (ref 80–100)
MONOCYTES # BLD AUTO: 0.49 K/UL — SIGNIFICANT CHANGE UP (ref 0–0.9)
MONOCYTES NFR BLD AUTO: 8 % — SIGNIFICANT CHANGE UP (ref 2–14)
NEUTROPHILS # BLD AUTO: 3.94 K/UL — SIGNIFICANT CHANGE UP (ref 1.8–7.4)
NEUTROPHILS NFR BLD AUTO: 64.1 % — SIGNIFICANT CHANGE UP (ref 43–77)
NRBC # BLD: 0 /100 WBCS — SIGNIFICANT CHANGE UP (ref 0–0)
PLATELET # BLD AUTO: 226 K/UL — SIGNIFICANT CHANGE UP (ref 150–400)
POTASSIUM SERPL-MCNC: 4.6 MMOL/L — SIGNIFICANT CHANGE UP (ref 3.5–5.3)
POTASSIUM SERPL-SCNC: 4.6 MMOL/L — SIGNIFICANT CHANGE UP (ref 3.5–5.3)
PROT SERPL-MCNC: 6.7 G/DL — SIGNIFICANT CHANGE UP (ref 6–8.3)
PROTHROM AB SERPL-ACNC: 12 SEC — SIGNIFICANT CHANGE UP (ref 10.6–13.6)
RBC # BLD: 3.97 M/UL — SIGNIFICANT CHANGE UP (ref 3.8–5.2)
RBC # FLD: 13 % — SIGNIFICANT CHANGE UP (ref 10.3–14.5)
SODIUM SERPL-SCNC: 141 MMOL/L — SIGNIFICANT CHANGE UP (ref 135–145)
WBC # BLD: 6.14 K/UL — SIGNIFICANT CHANGE UP (ref 3.8–10.5)
WBC # FLD AUTO: 6.14 K/UL — SIGNIFICANT CHANGE UP (ref 3.8–10.5)

## 2020-06-30 PROCEDURE — 13131 CMPLX RPR F/C/C/M/N/AX/G/H/F: CPT

## 2020-06-30 PROCEDURE — 70450 CT HEAD/BRAIN W/O DYE: CPT

## 2020-06-30 PROCEDURE — 85027 COMPLETE CBC AUTOMATED: CPT

## 2020-06-30 PROCEDURE — 70450 CT HEAD/BRAIN W/O DYE: CPT | Mod: 26

## 2020-06-30 PROCEDURE — 80053 COMPREHEN METABOLIC PANEL: CPT

## 2020-06-30 PROCEDURE — 72125 CT NECK SPINE W/O DYE: CPT

## 2020-06-30 PROCEDURE — 73610 X-RAY EXAM OF ANKLE: CPT

## 2020-06-30 PROCEDURE — 73562 X-RAY EXAM OF KNEE 3: CPT

## 2020-06-30 PROCEDURE — 99285 EMERGENCY DEPT VISIT HI MDM: CPT | Mod: 25

## 2020-06-30 PROCEDURE — 73562 X-RAY EXAM OF KNEE 3: CPT | Mod: 26,LT

## 2020-06-30 PROCEDURE — 85610 PROTHROMBIN TIME: CPT

## 2020-06-30 PROCEDURE — 72192 CT PELVIS W/O DYE: CPT | Mod: 26

## 2020-06-30 PROCEDURE — 73610 X-RAY EXAM OF ANKLE: CPT | Mod: 26,LT

## 2020-06-30 PROCEDURE — 99285 EMERGENCY DEPT VISIT HI MDM: CPT

## 2020-06-30 PROCEDURE — 85730 THROMBOPLASTIN TIME PARTIAL: CPT

## 2020-06-30 PROCEDURE — 72125 CT NECK SPINE W/O DYE: CPT | Mod: 26

## 2020-06-30 PROCEDURE — 72192 CT PELVIS W/O DYE: CPT

## 2020-06-30 PROCEDURE — 76377 3D RENDER W/INTRP POSTPROCES: CPT

## 2020-06-30 PROCEDURE — 76377 3D RENDER W/INTRP POSTPROCES: CPT | Mod: 26

## 2020-06-30 RX ORDER — LIDOCAINE 4 G/100G
1 CREAM TOPICAL ONCE
Refills: 0 | Status: COMPLETED | OUTPATIENT
Start: 2020-06-30 | End: 2020-06-30

## 2020-06-30 RX ORDER — ACETAMINOPHEN 500 MG
975 TABLET ORAL ONCE
Refills: 0 | Status: COMPLETED | OUTPATIENT
Start: 2020-06-30 | End: 2020-06-30

## 2020-06-30 RX ADMIN — LIDOCAINE 1 PATCH: 4 CREAM TOPICAL at 19:26

## 2020-06-30 RX ADMIN — Medication 975 MILLIGRAM(S): at 19:26

## 2020-06-30 RX ADMIN — Medication 1 TABLET(S): at 21:09

## 2020-06-30 NOTE — ED ADULT NURSE NOTE - PMH
Bradycardia    Cervical disc displacement    Endometriosis, Mild    EP (Epilepsy)  - seizure x 2 - 2008, and in 2010    h/o - left Tennis Elbow    Hx of sleep apnea - had sleep study done in ? 2005 - does not use CPAP machine  uses oral appliance  Pacemaker - insertion in 2004    Radiculopathy of cervical region    Sick sinus syndrome

## 2020-06-30 NOTE — ED PROVIDER NOTE - PROGRESS NOTE DETAILS
Spoke to Neurosurgery, reviewed CT, no traumatic injury sequela. Patient ambulated in ED, states she feels better. RG Spoke to Neurosurgery, reviewed CT, no traumatic injury sequela. Patient ambulated in ED, states she feels better. Discussed follow up with Orthopedics if patient has persistent pain, States she will follow up with Dr. Connell's group. RG

## 2020-06-30 NOTE — PROGRESS NOTE ADULT - SUBJECTIVE AND OBJECTIVE BOX
Left ear wound after fall with flap of tissue and extension into ear canal base through cartilage  RBA of repair reviewed  Tolerated closure bedside    PO Abx  OK to wash w soap and water  Bacitracin to wound  FU 2 weeks for suture removal

## 2020-06-30 NOTE — ED ADULT NURSE NOTE - NSIMPLEMENTINTERV_GEN_ALL_ED
Implemented All Fall Risk Interventions:  [place] to call system. Call bell, personal items and telephone within reach. Instruct patient to call for assistance. Room bathroom lighting operational. Non-slip footwear when patient is off stretcher. Physically safe environment: no spills, clutter or unnecessary equipment. Stretcher in lowest position, wheels locked, appropriate side rails in place. Provide visual cue, wrist band, yellow gown, etc. Monitor gait and stability. Monitor for mental status changes and reorient to person, place, and time. Review medications for side effects contributing to fall risk. Reinforce activity limits and safety measures with patient and family.

## 2020-06-30 NOTE — ED PROVIDER NOTE - PATIENT PORTAL LINK FT
You can access the FollowMyHealth Patient Portal offered by [facility] by registering at the following website: [email or web address]. By joining [facility]’s FollowMyHealth portal, you will also be able to view your health information using other applications (apps) compatible with our system.

## 2020-06-30 NOTE — ED ADULT NURSE REASSESSMENT NOTE - NS ED NURSE REASSESS COMMENT FT1
Pt states she feels slightly better with medications, Pt given antibiotics, L ear sutured by Plastics MD.

## 2020-06-30 NOTE — ED PROVIDER NOTE - PSH
h/o cardiac pacemaker insertion - 2004  battery replaced 2017, [employer] Model L331/ serial [id number], interrogated 12/2018  H/O colonoscopy    History of D&C  2012  History of D&C - 15 yrs ago    History of laparoscopy - many yrs ago

## 2020-06-30 NOTE — ED PROVIDER NOTE - NSFOLLOWUPINSTRUCTIONS_ED_ALL_ED_FT
1. Follow up with PMD or [facility] at [phone] in 24-48hrs.  2. Follow up with Dr. Moore in 2 weeks.   3. Augmentin 875mg 1 tab by mouth twice a day x 7 days.   4. For pain, Take acetaminophen as directed.   5. If patient develops worsening symptoms, pain, numbness, tingling or any other concern return to the ER.

## 2020-06-30 NOTE — ED PROVIDER NOTE - CHPI ED SYMPTOMS NEG
no dizziness, no visual changes, no blurry vision, no CP, no neck tenderness./no loss of consciousness

## 2020-06-30 NOTE — ED PROVIDER NOTE - OBJECTIVE STATEMENT
59y F with PMHx of HTN (on Atenolol), SSS S/P Pacemaker in 2004, Cervical Radiculopathy, Bradycardia, Sleep apnea, Endometriosis, Epilepsy (on Lamictal) presents c/o left ear laceration along with tenderness on the left side of the head and multiple abrasions on left side of body after mechanical fall down flight of stairs today, approximately 5 steps in total. Pt was carrying her work bag and computer monitor when she fell and pt sustained a laceration on her left ear from impact from the computer monitor. Endorsing pain mainly in the left hip, left knee, and left ear. Ambulated with assistance after the fall. Denies LOC, dizziness, visual changes, blurry vision, neck tenderness, CP, rib pain, N/V, abdominal pain, or recent seizures. Last Tdap vaccine a few years ago, as per pt. Denies taking any meds for pain relief. Not on blood thinners.

## 2020-06-30 NOTE — ED PROVIDER NOTE - CLINICAL SUMMARY MEDICAL DECISION MAKING FREE TEXT BOX
Kirti Escobedo (MD): 59y F presents S/P mechanical fall today with left ear laceration and left hip pain. Patient is GCS 15. Check labs, CT, XRs to evaluate for traumatic injury, and pain control. Kirti Escobedo): 59y F presents S/P mechanical fall today with left ear laceration and left hip pain. Patient is GCS 15. Check labs, CT, XRs to evaluate for traumatic injury, and pain control.    58 y/o F s/p fall with head trauma and left hip, knee, ankle pain. CT head/neck given hx of cervical hardware and pelvis as patient has limited ROM of left hip. Plain films of left knee and ankle given pain and limited ROM. Plastics for left ear laceration. Dispo pending results, possible admit if unable to ambulate - Hung Franco, DO PGY-2

## 2020-06-30 NOTE — ED ADULT NURSE NOTE - OBJECTIVE STATEMENT
58 yo female with a PMH pacemaker, seizures, presents to the ED via waiting room from home s/p unwitnessed fall. Patient was walking down the stairs when her slipper got caught on the carpet and fell five steps. Head hit monitor and wall of landing, but did no LOC. Complaining of L pelvic pain. Patient sustained a L ear cut, L foot abrasions, and L elbow abrasion. 60 yo female with a PMH pacemaker, seizures, and c4 c5 surgery presents to the ED via waiting room from home s/p unwitnessed fall. Patient is able to recall history of events. Head hit monitor and wall of landing, but did no LOC. Patient was walking down the stairs when her slipper got caught on the carpet and fell five steps. Denies being on any blood thinners. Complaining of L pelvic pain, sustained a L ear cut, L foot abrasions, and L elbow abrasion. Endorsing neck pain but at baseline has neck pain s/p cervical surgery. Patient moving all extremities spontaneously. Denies headache, dizziness, vision changes, chest pain, shortness of breath, abdominal pain, nausea, vomiting, diarrhea, fevers, chills, dysuria, hematuria, recent illness travel.

## 2020-06-30 NOTE — ED PROVIDER NOTE - PHYSICAL EXAMINATION
gen: well appearing  Mentation: AAO x 3  psych: mood appropriate  ENT: airway patent  Eyes: conjunctivae clear bilaterally  Cardio: RRR, no m/r/g  Resp: normal BS b/l  GI: s/nt/nd  : no CVA tenderness  Neuro: sensation and motor function intact, CN 2-12 intact  Skin: +2 cm laceration of left preauricular region extending over intertragic notch; 2 cm skin avulsion near anterior portion of laceration; ecchymosis of left lateral neck and left shin  MSK: limited ROM of left hip, knee, ankle; TTP over left distal femur and proximal tib; no midline tenderness; normal movement of all other extremities  Lymph/Vasc: no LE edema

## 2020-06-30 NOTE — ED ADULT NURSE NOTE - PSH
h/o cardiac pacemaker insertion - 2004  battery replaced 2017, [employer] Model L331/ serial [id number], interrogated 12/2018  H/O colonoscopy    History of D&C  2012  History of D&C - 15 yrs ago    History of laparoscopy - many yrs ago

## 2020-07-10 ENCOUNTER — TRANSCRIPTION ENCOUNTER (OUTPATIENT)
Age: 60
End: 2020-07-10

## 2020-09-08 ENCOUNTER — APPOINTMENT (OUTPATIENT)
Dept: ORTHOPEDIC SURGERY | Facility: CLINIC | Age: 60
End: 2020-09-08
Payer: COMMERCIAL

## 2020-09-08 VITALS — WEIGHT: 153 LBS | HEIGHT: 65 IN | BODY MASS INDEX: 25.49 KG/M2

## 2020-09-08 VITALS — TEMPERATURE: 98.7 F

## 2020-09-08 PROCEDURE — 99213 OFFICE O/P EST LOW 20 MIN: CPT

## 2020-09-08 PROCEDURE — 72040 X-RAY EXAM NECK SPINE 2-3 VW: CPT

## 2020-09-10 ENCOUNTER — APPOINTMENT (OUTPATIENT)
Dept: CT IMAGING | Facility: CLINIC | Age: 60
End: 2020-09-10
Payer: COMMERCIAL

## 2020-09-10 ENCOUNTER — OUTPATIENT (OUTPATIENT)
Dept: OUTPATIENT SERVICES | Facility: HOSPITAL | Age: 60
LOS: 1 days | End: 2020-09-10
Payer: COMMERCIAL

## 2020-09-10 ENCOUNTER — RESULT REVIEW (OUTPATIENT)
Age: 60
End: 2020-09-10

## 2020-09-10 DIAGNOSIS — Z98.890 OTHER SPECIFIED POSTPROCEDURAL STATES: Chronic | ICD-10-CM

## 2020-09-10 DIAGNOSIS — Z00.8 ENCOUNTER FOR OTHER GENERAL EXAMINATION: ICD-10-CM

## 2020-09-10 PROCEDURE — 76376 3D RENDER W/INTRP POSTPROCES: CPT | Mod: 26

## 2020-09-10 PROCEDURE — 72125 CT NECK SPINE W/O DYE: CPT

## 2020-09-10 PROCEDURE — 72125 CT NECK SPINE W/O DYE: CPT | Mod: 26

## 2020-09-10 PROCEDURE — 76376 3D RENDER W/INTRP POSTPROCES: CPT

## 2020-10-07 ENCOUNTER — APPOINTMENT (OUTPATIENT)
Dept: INTERNAL MEDICINE | Facility: CLINIC | Age: 60
End: 2020-10-07
Payer: COMMERCIAL

## 2020-10-07 VITALS
HEIGHT: 65 IN | SYSTOLIC BLOOD PRESSURE: 120 MMHG | BODY MASS INDEX: 25.83 KG/M2 | OXYGEN SATURATION: 98 % | HEART RATE: 64 BPM | TEMPERATURE: 98.1 F | DIASTOLIC BLOOD PRESSURE: 70 MMHG | WEIGHT: 155 LBS

## 2020-10-07 DIAGNOSIS — Z00.00 ENCOUNTER FOR GENERAL ADULT MEDICAL EXAMINATION W/OUT ABNORMAL FINDINGS: ICD-10-CM

## 2020-10-07 PROCEDURE — 99396 PREV VISIT EST AGE 40-64: CPT

## 2020-10-07 RX ORDER — PREDNISONE 10 MG/1
10 TABLET ORAL
Qty: 39 | Refills: 0 | Status: COMPLETED | COMMUNITY
Start: 2020-09-08 | End: 2020-10-07

## 2020-10-07 NOTE — PLAN
[FreeTextEntry1] : Reviewed FBW results with pt Slightly increased LDL from prior labs\par Improvement with diet and exercise needed.\par Pt will make her follow up appointment with Cardiology as she has not been seen in a year\par She will see pain management on Friday for further evaluation of her neck and left arm pain secondary from new spurring @ C5-C6.\par

## 2020-10-07 NOTE — REVIEW OF SYSTEMS
[Suicidal] : not suicidal [Insomnia] : no insomnia [Anxiety] : anxiety [Depression] : no depression [Negative] : Heme/Lymph [FreeTextEntry9] : Neck and left arm pain  [de-identified] : Increased stressors

## 2020-10-07 NOTE — HISTORY OF PRESENT ILLNESS
[FreeTextEntry1] : 60 y/o female present today for a physical exam with outside labs.  [de-identified] : Pt presents to the office today for CPE and review of FBW\par She has been under increased stress and has been suffering with neck pain and left arm Neuropathy for the last few weeks.  She was Dx with spurring C5-C6.  She is seeing Dr Damon Friday \par Diet and exercise have been fair\par She is complaint with her medications \par She has not seen Dr Francois Cardio in a year and is making appointment\par She has seen Electrophysiologist  in June for her follow up

## 2020-10-12 ENCOUNTER — TRANSCRIPTION ENCOUNTER (OUTPATIENT)
Age: 60
End: 2020-10-12

## 2020-10-12 LAB
25(OH)D3 SERPL-MCNC: 32 NG/ML
ALBUMIN SERPL ELPH-MCNC: 4.5 G/DL
ALP BLD-CCNC: 87 U/L
ALT SERPL-CCNC: 11 U/L
ANION GAP SERPL CALC-SCNC: 13 MMOL/L
APPEARANCE: CLEAR
AST SERPL-CCNC: 18 U/L
BACTERIA: NEGATIVE
BASOPHILS # BLD AUTO: 0.03 K/UL
BASOPHILS NFR BLD AUTO: 0.8 %
BILIRUB SERPL-MCNC: 0.2 MG/DL
BILIRUBIN URINE: NEGATIVE
BLOOD URINE: NEGATIVE
BUN SERPL-MCNC: 11 MG/DL
CALCIUM SERPL-MCNC: 9.5 MG/DL
CHLORIDE SERPL-SCNC: 105 MMOL/L
CHOLEST SERPL-MCNC: 193 MG/DL
CHOLEST/HDLC SERPL: 2.4 RATIO
CO2 SERPL-SCNC: 25 MMOL/L
COLOR: YELLOW
CREAT SERPL-MCNC: 0.85 MG/DL
EOSINOPHIL # BLD AUTO: 0.09 K/UL
EOSINOPHIL NFR BLD AUTO: 2.4 %
ESTIMATED AVERAGE GLUCOSE: 114 MG/DL
GLUCOSE QUALITATIVE U: NEGATIVE
GLUCOSE SERPL-MCNC: 95 MG/DL
HBA1C MFR BLD HPLC: 5.6 %
HCT VFR BLD CALC: 39.4 %
HDLC SERPL-MCNC: 79 MG/DL
HGB BLD-MCNC: 12.2 G/DL
HYALINE CASTS: 1 /LPF
IMM GRANULOCYTES NFR BLD AUTO: 0 %
KETONES URINE: NEGATIVE
LDLC SERPL CALC-MCNC: 103 MG/DL
LEUKOCYTE ESTERASE URINE: NEGATIVE
LYMPHOCYTES # BLD AUTO: 1.27 K/UL
LYMPHOCYTES NFR BLD AUTO: 33.6 %
MAN DIFF?: NORMAL
MCHC RBC-ENTMCNC: 29.9 PG
MCHC RBC-ENTMCNC: 31 GM/DL
MCV RBC AUTO: 96.6 FL
MICROSCOPIC-UA: NORMAL
MONOCYTES # BLD AUTO: 0.27 K/UL
MONOCYTES NFR BLD AUTO: 7.1 %
NEUTROPHILS # BLD AUTO: 2.12 K/UL
NEUTROPHILS NFR BLD AUTO: 56.1 %
NITRITE URINE: NEGATIVE
PH URINE: 5.5
PLATELET # BLD AUTO: 221 K/UL
POTASSIUM SERPL-SCNC: 5 MMOL/L
PROT SERPL-MCNC: 6.2 G/DL
PROTEIN URINE: NORMAL
RBC # BLD: 4.08 M/UL
RBC # FLD: 13.8 %
RED BLOOD CELLS URINE: 4 /HPF
SODIUM SERPL-SCNC: 143 MMOL/L
SPECIFIC GRAVITY URINE: 1.03
SQUAMOUS EPITHELIAL CELLS: 0 /HPF
TRIGL SERPL-MCNC: 54 MG/DL
TSH SERPL-ACNC: 1.99 UIU/ML
UROBILINOGEN URINE: NORMAL
VIT B12 SERPL-MCNC: 522 PG/ML
WBC # FLD AUTO: 3.78 K/UL
WHITE BLOOD CELLS URINE: 1 /HPF

## 2020-10-20 ENCOUNTER — RX RENEWAL (OUTPATIENT)
Age: 60
End: 2020-10-20

## 2020-11-16 ENCOUNTER — APPOINTMENT (OUTPATIENT)
Dept: NEUROLOGY | Facility: CLINIC | Age: 60
End: 2020-11-16
Payer: COMMERCIAL

## 2020-11-16 VITALS
WEIGHT: 153 LBS | HEIGHT: 65 IN | DIASTOLIC BLOOD PRESSURE: 63 MMHG | SYSTOLIC BLOOD PRESSURE: 112 MMHG | BODY MASS INDEX: 25.49 KG/M2 | HEART RATE: 65 BPM

## 2020-11-16 PROCEDURE — 99213 OFFICE O/P EST LOW 20 MIN: CPT

## 2020-11-16 PROCEDURE — 99072 ADDL SUPL MATRL&STAF TM PHE: CPT

## 2020-11-16 NOTE — HISTORY OF PRESENT ILLNESS
[FreeTextEntry1] : /100, Atenolol\par \par Updates:  spurs at the left C5-6 foramen which correlates with  her symptoms of tingling numbness and some shooting pain in the forearm and hand 1st 3 digitis. getting inj to neck.\par s/p cervical neck surgery feb 2019. had neck pain, tension her neck, shoulder blades with head turn still.   no perceived loss of strength in hands or biceps.  no recent radiation of pain, numbness to left ear, throat area, no spasms of face.  numbness of feet better since surgery.  \par Less pain in left hip/buttock pain from sleep\par \par Denies migraines in past, occ milder HA with muscle tension in back of head/occiput.  using NSAIDs prn now. no sinus issues. has done accupunture.\par \par no seizures for many years. \par no szs, no ADRs on /150mg low dose.\par \par On ROS: +/-sleeping OK,  and she has not intentionally lost weight.  Mood is good.  some swelling in legs\par \par PMHX: 57 yo RH F with convulsive seizures in 5/2010, 5/2008 x2, and episodes of LOC prior to this since age 44 (unclear if seizures or cardiac).  She had asystole documented in 2008 and had pacemaker placed at that time, with prior h/o afib.  Describes prior spells of flushing sensation from head to toe with fatigue a few yrs ago.\par She has had no seizures in 5 years, and is driving now.  She is working and is . \par \par She thinks that she has had about three convulsions in her lifetime and cannot count the partial ones, but has not had one in many years.  \par \par Father had seizures after head trauma, and there are no other risk factors.

## 2020-11-16 NOTE — DISCUSSION/SUMMARY
[Well-controlled] : well-controlled [Secondary Generalization] : secondary generalization [Idiopathic] : idiopathic  [Focal] : focal [Safety Recommendations] : The patient was advised in regards to the risk of seizures and general seizure safety recommendations including not to be bathing alone, climbing to high places and operating heavy machinery. [Medication Side Effects] : High frequency and serious potential medication adverse effects were reviewed with the patient, including but not exclusive to psychiatric effects.  Information sheets on medication side effects were made available to the patient in our clinic.  The patient or advocate agrees to notify us for any concerns. [Sleep Hygiene/Sleep Disruption Risks] : Sleep hygiene and the risks of sleep disruption were discussed. 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    \par H/o syncope, s/p PPM.\par \par s/p 2019 surgery for cervical myelopathy/stenosis, max at C4-C5.  some hand numbness intermittently on left pre-op, improved.  Improved dysesthesia of the ear (numbness in the lower half of the ear/lobe, and behind mandible in neck in C2/3 distribution to pinprick), may have been of high superficial cervical plexus etiology (C2-C3 greater auricular n.). \par Unfortunately now, C5-6 left radiculopathy.\par L5-S1 likely causative of left buttock pain, sciatica, and foot dysesthesias. \par \par Cont /d\par consider trial low dose pregabalin or elavil for neuropathic radicular pain\par f/u ortho for other options if pain intractable.

## 2020-11-16 NOTE — REVIEW OF SYSTEMS
[As Noted in HPI] : as noted in HPI [Arthralgias] : arthralgias [Negative] : Heme/Lymph [de-identified] : She is complaining of loss of taste without loss of smell.   [FreeTextEntry9] : right arm pain

## 2020-11-16 NOTE — DATA REVIEWED
[de-identified] : 4/2015 CTs have been normal of head, and she cannot have MRI and did not have one prior to the pacemaker.  \par 2016 CT moderate canal stenosis, cord flattening.\par 2019 CT C4-5 central left disc with compression. C5-6, C6-7 root issues.  T11-12 right disc with root compression, L5-S1 disc and root compression. [de-identified] :  EEG showed left temporal spikes in past [de-identified] : Lamictal level was 6.3 in July 2013 on 300 mg per day but dose decreased to 250 for the day due to side effects.  Previous level was 4.1 on 200 mg per day.  LTG 5.2 2016

## 2020-12-15 ENCOUNTER — APPOINTMENT (OUTPATIENT)
Dept: ELECTROPHYSIOLOGY | Facility: CLINIC | Age: 60
End: 2020-12-15
Payer: COMMERCIAL

## 2020-12-15 VITALS — RESPIRATION RATE: 14 BRPM | DIASTOLIC BLOOD PRESSURE: 66 MMHG | SYSTOLIC BLOOD PRESSURE: 111 MMHG | HEART RATE: 65 BPM

## 2020-12-15 PROCEDURE — 99072 ADDL SUPL MATRL&STAF TM PHE: CPT

## 2020-12-15 PROCEDURE — 93280 PM DEVICE PROGR EVAL DUAL: CPT

## 2021-01-17 ENCOUNTER — TRANSCRIPTION ENCOUNTER (OUTPATIENT)
Age: 61
End: 2021-01-17

## 2021-01-21 ENCOUNTER — APPOINTMENT (OUTPATIENT)
Dept: INTERNAL MEDICINE | Facility: CLINIC | Age: 61
End: 2021-01-21
Payer: COMMERCIAL

## 2021-01-21 PROCEDURE — 99213 OFFICE O/P EST LOW 20 MIN: CPT

## 2021-01-21 PROCEDURE — 99072 ADDL SUPL MATRL&STAF TM PHE: CPT

## 2021-01-22 VITALS
TEMPERATURE: 98.2 F | DIASTOLIC BLOOD PRESSURE: 72 MMHG | HEART RATE: 68 BPM | OXYGEN SATURATION: 97 % | RESPIRATION RATE: 16 BRPM | SYSTOLIC BLOOD PRESSURE: 118 MMHG

## 2021-01-22 NOTE — PLAN
[FreeTextEntry1] : Pt is having bronchospasms and sinusitis \par She will take Mucinex DM max and start medications given above\par Follow up in 1 week if not feeling better\par

## 2021-01-22 NOTE — REVIEW OF SYSTEMS
[Shortness Of Breath] : no shortness of breath [Wheezing] : no wheezing [Cough] : cough [Negative] : Cardiovascular [FreeTextEntry4] : Sinus pressure and pain

## 2021-01-22 NOTE — PHYSICAL EXAM
[Normal] : normal rate, regular rhythm, normal S1 and S2 and no murmur heard [de-identified] : maxillary pain and pressure with palpation

## 2021-01-22 NOTE — HISTORY OF PRESENT ILLNESS
[FreeTextEntry8] : Pt presents to the office today with cough and upper resp symptoms for the last week.  She was seen in UC and COVID testing was negative.  She is starting to feel worse with cough and congestion

## 2021-02-08 NOTE — H&P PST ADULT - BP NONINVASIVE DIASTOLIC (MM HG)
Problem: Acute Coronary Syndrome (ACS) (Adult)  Goal: Signs and Symptoms of Listed Potential Problems Will be Absent or Manageable (Acute Coronary Syndrome)  Outcome: Ongoing (interventions implemented as appropriate)    Problem: Patient Care Overview (Adult)  Goal: Plan of Care Review  Outcome: Ongoing (interventions implemented as appropriate)  Goal: Adult Individualization and Mutuality  Outcome: Ongoing (interventions implemented as appropriate)  Goal: Discharge Needs Assessment  Outcome: Ongoing (interventions implemented as appropriate)    Problem: Pain, Chronic (Adult)  Goal: Identify Related Risk Factors and Signs and Symptoms  Outcome: Outcome(s) achieved Date Met:  12/14/17  Goal: Acceptable Pain Control/Comfort Level  Outcome: Ongoing (interventions implemented as appropriate)    Problem: Fall Risk (Adult)  Goal: Identify Related Risk Factors and Signs and Symptoms  Outcome: Outcome(s) achieved Date Met:  12/14/17  Goal: Absence of Falls  Outcome: Ongoing (interventions implemented as appropriate)      
unknown
77

## 2021-04-24 ENCOUNTER — NON-APPOINTMENT (OUTPATIENT)
Age: 61
End: 2021-04-24

## 2021-04-30 ENCOUNTER — APPOINTMENT (OUTPATIENT)
Dept: GASTROENTEROLOGY | Facility: CLINIC | Age: 61
End: 2021-04-30
Payer: COMMERCIAL

## 2021-04-30 DIAGNOSIS — D36.9 BENIGN NEOPLASM, UNSPECIFIED SITE: ICD-10-CM

## 2021-04-30 DIAGNOSIS — K80.20 CALCULUS OF GALLBLADDER W/OUT CHOLECYSTITIS W/OUT OBSTRUCTION: ICD-10-CM

## 2021-04-30 PROCEDURE — 99072 ADDL SUPL MATRL&STAF TM PHE: CPT

## 2021-04-30 PROCEDURE — 99215 OFFICE O/P EST HI 40 MIN: CPT

## 2021-04-30 NOTE — PHYSICAL EXAM
[General Appearance - Alert] : alert [General Appearance - In No Acute Distress] : in no acute distress [Sclera] : the sclera and conjunctiva were normal [Extraocular Movements] : extraocular movements were intact [Outer Ear] : the ears and nose were normal in appearance [Hearing Threshold Finger Rub Not Lewis] : hearing was normal [Neck Appearance] : the appearance of the neck was normal [Neck Cervical Mass (___cm)] : no neck mass was observed [Auscultation Breath Sounds / Voice Sounds] : lungs were clear to auscultation bilaterally [Heart Rate And Rhythm] : heart rate was normal and rhythm regular [Heart Sounds] : normal S1 and S2 [Heart Sounds Gallop] : no gallops [Murmurs] : no murmurs [Heart Sounds Pericardial Friction Rub] : no pericardial rub [Bowel Sounds] : normal bowel sounds [Abdomen Soft] : soft [Abdomen Tenderness] : non-tender [Abdomen Mass (___ Cm)] : no abdominal mass palpated [Cervical Lymph Nodes Enlarged Posterior Bilaterally] : posterior cervical [Cervical Lymph Nodes Enlarged Anterior Bilaterally] : anterior cervical [Supraclavicular Lymph Nodes Enlarged Bilaterally] : supraclavicular [Abnormal Walk] : normal gait [Nail Clubbing] : no clubbing  or cyanosis of the fingernails [Skin Color & Pigmentation] : normal skin color and pigmentation [] : no rash [Oriented To Time, Place, And Person] : oriented to person, place, and time [Impaired Insight] : insight and judgment were intact [Affect] : the affect was normal

## 2021-04-30 NOTE — ASSESSMENT
[FreeTextEntry1] : IMPRESSION:\par #  History of acid reflux - worsening since past 4 to 6 months \par \par #  Low risk tubular adenoma\par  -  Colonoscopy in 2018 - 5 mm polyp in the cecum removed (tubular adenoma) - few sigmoid diverticulosis - repeat colonoscopy 3 years\par -  Colonoscopy in  few diverticulosis - repeat exam in 5 years. \par \par #  Family history of pancreatic cancer - mother at 64\par -   EGD/EUS - normal pancreas \par -  CT scan of the abd/pelvis without IV contrast on  showed normal pancreas\par \par #  Gallstones - seen on EUS in 2018 - no longer with RUQ pain as she did in 2018\par Gallbladder polyp \par -  Abdominal ultrasound in 2018 showed a 2 mm gallbladder polyp unchanged since 2016\par \par #  ? Autoimmune gastritis in 2018\par -  EGD/EUS in 2018:  Gastric polyps in fundus with bx showing foveolar hyperplastic polyp - histology features of gastric polyp may be nonspecific changes secondary to the polyp but raise the concern of autoimmune gastritis as per pathologist \par -  Antiparietal cell antibody and intrinsic factor blocking antibody were negative in 2018\par -  H. pylori antibody negative in 2018\par \par #  History of RUQ pain in 2018 s/p abdominal ultrasound and HIDA scan (results not in chart) - no longer with RUQ pain since last visit - 2018 \par \par #  History of anemia in  but resolved in  - normal H/H in Oct 2020.\par \par #  Comorbidities:  Obstructive sleep apnea, seizures (at age 47 and 49), and history of syncope at age 44 with documented sinus arrest leading to permanent pacemaker implantation (Guidant insignia dual-chamber). \par \par \par \par \par DISCUSSION/PLAN: \par Since chronic reflux, history of gastric polyp with histology showing possible Autoimmune gastritis (although blood work did not support) she would need an upper endoscopy to r/o PUD, esophageal ulcer, autoimmune gastritis - she is worried about pancreatic cancer since her mom was diagnosed at 64 and  4 months later.  She would like assessment pancreas at time of upper endoscopy.  I have referred to Thu Hogan for EGD/EUS.   During the upper endoscopy she would need multiple gastric biopsies to confirm possibilities autoimmune gastritis.  \par \par She is due for a surveillance colonoscopy.  I have advised the patient to arrange for a colonoscopy to rule out colon polyps, colorectal cancer etc. under monitored anesthesia care.  Risks such as perforation  (4 in 10,000) requiring surgery, bleeding (8 in 10,000), infection, diverticulitis, colitis, missed colon cancer (2% to 6%), internal organ injury, etc, risks of bowel prep including colitis, syncope, adverse reaction to medication etc. and risks of anesthesia including cardiopulmonary compromise were discussed with patient.  Patient verbalized understanding and agrees to proceed with the planned procedure.\par \par Needs clearance from cardiologist and pacemaker check prior to procedures. \par \par Abdominal ultrasound for surveillance of gallbladder polyp - if the size of the gallbladder polyp is 1 cm or more then she would need gallbladder removed.   \par \par Get records of HIDA scan done in 2018 by surgeon Dr. Worthy \par If  RUQ pain especial after a fatty meal may be due to gallstones - she would need see surgeon Dr. Worthy

## 2021-04-30 NOTE — HISTORY OF PRESENT ILLNESS
[de-identified] : 61 y/o woman with Hx of Obstructive sleep apnea, history of seizures (at age 47 and 49), history of cervical spinal stenosis, and history of syncope at age 44 with documented sinus arrest leading to permanent pacemaker implantation (Guidant insignia dual-chamber) who presents for follow up - she is due for a colonoscopy this year.  She is concerned about her pancreas since mom had pancreatic cancer.  She is "concerned about her esophagus." \par \par Interview started at 12:51. \par \par Only one member in the family with pancreatic cancer - mom had pancreatic - mother was one of 9 sibling - one or two had cancer - but she is not sure what type of cancer.  \par \par Concerned about her esophagus - she gets acid reflux -takes papaya and it helps.\par has burning in chest - since past 4 to 6 months its been getting worse.  \par \par No difficulty swallowing, no painful swallowing.  \par Feels an irritation in back of throat. \par No nausea, no vomiting. \par No loss of appetite, no weight gain.  \par \par RUQ pain resolved since last visit - only when she presses her abdomen. \par \par No diarrhea.  Constipation on and off.  Stools are dark at times.   No melena and no hematochezia. \par \par She is feeling tired - has gained weight. \par \par Sees cardiology once a year - gets pacemaker checked 4 times a year.  \par \par Brother  of heart attack at age 60. \par \par \par \par Initial visit on 2018: \par Pt is referred by her general surgeon for evaluation of RUQ pain. The patient reports since the past 6 weeks she's had a right upper quadrant discomfort. She says it feels like a tenderness that is constant throughout the day but varies in intensity with maximal discomfort rated as 4-5/10 on pain scale. She denies radiation of pain but recently she felt a discomfort in right neck that is new. She recently started taking Nexium which has not helped. She was seen by general surgeon for the pain who ordered an abdominal ultrasound and is currently awaiting a HIDA scan with CCK.\par \par There are no alleviating factors - at times the pain is felt more upon turning or moving. \par \par She is worried about pancreatic cancer. Her mother was diagnosed with pancreatic cancer at 64 and  4 months later. \par \par She has changed her diet, trying to lose weight for an upcoming wedding - since the past 2-3 weeks she's been eating salads.\par \par She says her stools have changed somewhat she tends to be constipated but lately she can have loose stools. She has seen mucus in her stools and her stools have been lighter in color.\par \par On 2018 the patient had blood work revealed a hemoglobin of 10.6 with hematocrit of 34.0. Her liver function profile is normal. Her lipase was normal.\par \par On recent abdominal ultrasound in , she was found to have a 2 mm gallbladder polyp unchanged from ultrasound in 2016. There were no stones seen within the gallbladder. Otherwise normal. \par \par Her last CAT scan of the abdomen and pelvis was performed in 2016 without IV contrast. On the noncontrast study her pancreas looked normal.\par \par In May of 2015, the patient had an upper endoscopy and was found to have a 2 cm hiatal hernia. There was an erosion seen in the gastric body that was biopsied. Duodenal biopsies revealed chronic duodenitis with focal surface gastric metaplasia suggestive of peptic duodenitis. Gastric biopsies revealed mild chronic gastritis negative for H. pylori bacteria and negative for intestinal metaplasia. Esophageal biopsies revealed mild reactive epithelial changes.\par \par On 2013 the patient had a colonoscopy for screening and was found to have moderate internal hemorrhoids. She was found to have few diverticulosis. She was recommended to have a repeat colonoscopy in 5 years.\par \par Other cancers that run in her family her her paternal grandmother had bone cancer. Her Brother  of sudden MI in his 60s. \par \par \par Interval Hx: \par On 2018 underwent EUS found ot have multiple small polyps in the gastric fundus. Mild duodenitis in the bulb, D2 was normal. Pancreas was normal. There were a couple of small stones in the gallbladder. \par \par Gastric polyp was a foveolar hyperplastic polyp. The background gastric body/fundic mucosa with chronic gastritis negative for H. pylori. Lymphocytes, plasma cells, and eosinophils infiltrate into the entire thickness of the gastric fundic mucosa. These histologic features may be nonspecific changes secondary to the polyp but raise the concern of immune medicated gastritis. \par On 2018 she had a colonoscopy and found to have a 5 mm polyp in the cecum that was removed with cold snare polypectomy. Few sigmoid diverticulosis. Polyp was a tubular adenoma.\par Today's visit: \par \par \par May 2018 office visit: \par Patient reports feeling well. She says her RUQ pain has resolved. She offers no other complaints. \par \par \par Discussion/Summary May 2018 \par Results of serologies for autoimmune gastritis reviewed with patient over the phone. Patient was anemic in  with Hgb of 10.6. Discussed the possibility of CT enterography to evaluated Small bowel since patient not a candidate for capsule. \par \par Will recheck Blood test, and fecal occult blood test - if persistent anemia then CT enterography. \par \par \par \par  \par Discussion/Summary May 2018: \par Discussed recent blood test- In 2018 the patient had blood work revealed a hemoglobin of 10.6 with hematocrit of 34.0. Recent blood test showed H/H of 11.0/34.0, FOBT negative. will hold off on evaluation of small bowel - pt to follow up with PCP, may recheck CBC in 3 months - if persistently anemic without other explanation will consider CT enterography.

## 2021-05-01 LAB
ALBUMIN SERPL ELPH-MCNC: 4.8 G/DL
ALP BLD-CCNC: 110 U/L
ALT SERPL-CCNC: 11 U/L
AMYLASE/CREAT SERPL: 37 U/L
ANION GAP SERPL CALC-SCNC: 12 MMOL/L
AST SERPL-CCNC: 19 U/L
BASOPHILS # BLD AUTO: 0.04 K/UL
BASOPHILS NFR BLD AUTO: 0.7 %
BILIRUB SERPL-MCNC: 0.2 MG/DL
BUN SERPL-MCNC: 13 MG/DL
CALCIUM SERPL-MCNC: 10.3 MG/DL
CHLORIDE SERPL-SCNC: 106 MMOL/L
CO2 SERPL-SCNC: 26 MMOL/L
CREAT SERPL-MCNC: 0.88 MG/DL
EOSINOPHIL # BLD AUTO: 0.06 K/UL
EOSINOPHIL NFR BLD AUTO: 1 %
GLUCOSE SERPL-MCNC: 102 MG/DL
HCT VFR BLD CALC: 40.6 %
HGB BLD-MCNC: 12.6 G/DL
IMM GRANULOCYTES NFR BLD AUTO: 0.2 %
LPL SERPL-CCNC: 36 U/L
LYMPHOCYTES # BLD AUTO: 1.96 K/UL
LYMPHOCYTES NFR BLD AUTO: 33.7 %
MAN DIFF?: NORMAL
MCHC RBC-ENTMCNC: 29.4 PG
MCHC RBC-ENTMCNC: 31 GM/DL
MCV RBC AUTO: 94.6 FL
MONOCYTES # BLD AUTO: 0.49 K/UL
MONOCYTES NFR BLD AUTO: 8.4 %
NEUTROPHILS # BLD AUTO: 3.25 K/UL
NEUTROPHILS NFR BLD AUTO: 56 %
PLATELET # BLD AUTO: 266 K/UL
POTASSIUM SERPL-SCNC: 4.6 MMOL/L
PROT SERPL-MCNC: 6.9 G/DL
RBC # BLD: 4.29 M/UL
RBC # FLD: 13.2 %
SODIUM SERPL-SCNC: 144 MMOL/L
WBC # FLD AUTO: 5.81 K/UL

## 2021-05-06 ENCOUNTER — APPOINTMENT (OUTPATIENT)
Dept: CARDIOLOGY | Facility: CLINIC | Age: 61
End: 2021-05-06
Payer: COMMERCIAL

## 2021-05-06 ENCOUNTER — NON-APPOINTMENT (OUTPATIENT)
Age: 61
End: 2021-05-06

## 2021-05-06 VITALS
HEART RATE: 65 BPM | OXYGEN SATURATION: 99 % | WEIGHT: 158 LBS | BODY MASS INDEX: 26.33 KG/M2 | HEIGHT: 65 IN | SYSTOLIC BLOOD PRESSURE: 124 MMHG | DIASTOLIC BLOOD PRESSURE: 75 MMHG

## 2021-05-06 DIAGNOSIS — R53.83 OTHER FATIGUE: ICD-10-CM

## 2021-05-06 PROCEDURE — 99214 OFFICE O/P EST MOD 30 MIN: CPT

## 2021-05-06 PROCEDURE — 99072 ADDL SUPL MATRL&STAF TM PHE: CPT

## 2021-05-06 PROCEDURE — 93000 ELECTROCARDIOGRAM COMPLETE: CPT

## 2021-05-07 ENCOUNTER — OUTPATIENT (OUTPATIENT)
Dept: OUTPATIENT SERVICES | Facility: HOSPITAL | Age: 61
LOS: 1 days | End: 2021-05-07
Payer: COMMERCIAL

## 2021-05-07 ENCOUNTER — APPOINTMENT (OUTPATIENT)
Dept: ULTRASOUND IMAGING | Facility: CLINIC | Age: 61
End: 2021-05-07
Payer: COMMERCIAL

## 2021-05-07 ENCOUNTER — APPOINTMENT (OUTPATIENT)
Dept: MAMMOGRAPHY | Facility: CLINIC | Age: 61
End: 2021-05-07
Payer: COMMERCIAL

## 2021-05-07 ENCOUNTER — NON-APPOINTMENT (OUTPATIENT)
Age: 61
End: 2021-05-07

## 2021-05-07 DIAGNOSIS — N60.19 DIFFUSE CYSTIC MASTOPATHY OF UNSPECIFIED BREAST: ICD-10-CM

## 2021-05-07 DIAGNOSIS — Z12.31 ENCOUNTER FOR SCREENING MAMMOGRAM FOR MALIGNANT NEOPLASM OF BREAST: ICD-10-CM

## 2021-05-07 DIAGNOSIS — Z98.890 OTHER SPECIFIED POSTPROCEDURAL STATES: Chronic | ICD-10-CM

## 2021-05-07 PROCEDURE — 77063 BREAST TOMOSYNTHESIS BI: CPT | Mod: 26

## 2021-05-07 PROCEDURE — 76700 US EXAM ABDOM COMPLETE: CPT | Mod: 26

## 2021-05-07 PROCEDURE — 76641 ULTRASOUND BREAST COMPLETE: CPT | Mod: 26,50

## 2021-05-07 PROCEDURE — 77067 SCR MAMMO BI INCL CAD: CPT | Mod: 26

## 2021-05-07 PROCEDURE — 77067 SCR MAMMO BI INCL CAD: CPT

## 2021-05-07 PROCEDURE — 76641 ULTRASOUND BREAST COMPLETE: CPT

## 2021-05-07 PROCEDURE — 76700 US EXAM ABDOM COMPLETE: CPT

## 2021-05-07 PROCEDURE — 77063 BREAST TOMOSYNTHESIS BI: CPT

## 2021-05-08 ENCOUNTER — RESULT CHARGE (OUTPATIENT)
Age: 61
End: 2021-05-08

## 2021-05-09 PROBLEM — R53.83 FATIGUE: Status: ACTIVE | Noted: 2019-06-06

## 2021-05-09 NOTE — HISTORY OF PRESENT ILLNESS
[FreeTextEntry1] : Gricelda presents for evaluation of cv risk. She did well with her neck surgery and has had no more significant neck symptoms. She remains busy at work and has her first grandchild. She reports no chest pain, dyspnea, palpitations, lightheadedness, or syncope\par \par In March of 2017 she had a new [employer] generator replaced\par \par Past medical history is remarkable for her being perimenopausal, recent C4/C5 fusion, history of obstructive sleep apnea, history of seizures, history of cervical spinal stenosis, and history of syncope at age 44 with documented sinus arrest leading to permanent pacemaker implantation (Guidant insignia dual-chamber). Of note, on routine pacemaker interrogation in the past, she has been noted to have very brief burst of atrial fibrillation and rare nonsustained ventricular tachycardia.\par \par Social history is negative for alcohol and tobacco use. Her daughter Edel is working as a guidance counselor at [employer].

## 2021-05-09 NOTE — DISCUSSION/SUMMARY
[FreeTextEntry1] : Gricelda is doing well with no new active cardiac issues. Her pacemaker is working well and is followed by Dr. Khan. No other cardiac evaluation is necessary at this time and further management can be dependent on her clinical course.  Counseling on cardiovascular risk represented greater than 50% of her visit which was 30 minutes in duration

## 2021-05-09 NOTE — PHYSICAL EXAM
[General Appearance - Well Developed] : well developed [Normal Appearance] : normal appearance [Well Groomed] : well groomed [No Deformities] : no deformities [General Appearance - Well Nourished] : well nourished [General Appearance - In No Acute Distress] : no acute distress [Normal Conjunctiva] : the conjunctiva exhibited no abnormalities [Eyelids - No Xanthelasma] : the eyelids demonstrated no xanthelasmas [Normal Oral Mucosa] : normal oral mucosa [No Oral Pallor] : no oral pallor [No Oral Cyanosis] : no oral cyanosis [Normal Jugular Venous A Waves Present] : normal jugular venous A waves present [Normal Jugular Venous V Waves Present] : normal jugular venous V waves present [No Jugular Venous Rashid A Waves] : no jugular venous rashid A waves [Respiration, Rhythm And Depth] : normal respiratory rhythm and effort [Auscultation Breath Sounds / Voice Sounds] : lungs were clear to auscultation bilaterally [Exaggerated Use Of Accessory Muscles For Inspiration] : no accessory muscle use [Abdomen Soft] : soft [Abdomen Tenderness] : non-tender [Abnormal Walk] : normal gait [Abdomen Mass (___ Cm)] : no abdominal mass palpated [Gait - Sufficient For Exercise Testing] : the gait was sufficient for exercise testing [Nail Clubbing] : no clubbing of the fingernails [Cyanosis, Localized] : no localized cyanosis [Petechial Hemorrhages (___cm)] : no petechial hemorrhages [Skin Color & Pigmentation] : normal skin color and pigmentation [] : no rash [No Venous Stasis] : no venous stasis [Skin Lesions] : no skin lesions [No Skin Ulcers] : no skin ulcer [No Xanthoma] : no  xanthoma was observed [Oriented To Time, Place, And Person] : oriented to person, place, and time [Affect] : the affect was normal [Mood] : the mood was normal [No Anxiety] : not feeling anxious [5th Left ICS - MCL] : palpated at the 5th LICS in the midclavicular line [Normal] : normal [No Precordial Heave] : no precordial heave was noted [Apical Thrill] : no thrill palpable at the apex [Normal Rate] : normal [Heart Rate ___] : [unfilled] bpm [Rhythm Regular] : regular [Normal S1] : normal S1 [Normal S2] : normal S2 [No Gallop] : no gallop heard [S3] : no S3 [Click] : no click [Pericardial Rub] : no pericardial rub [No Murmur] : no murmurs heard [Right Carotid Bruit] : no bruit heard over the right carotid [Left Carotid Bruit] : no bruit heard over the left carotid [Right Femoral Bruit] : no bruit heard over the right femoral artery [Left Femoral Bruit] : no bruit heard over the left femoral artery [2+] : left 2+ [No Abnormalities] : the abdominal aorta was not enlarged and no bruit was heard [Bruit] : no bruit heard [No Pitting Edema] : no pitting edema present [Rt] : no varicose veins of the right leg [Lt] : no varicose veins of the left leg

## 2021-05-09 NOTE — CARDIOLOGY SUMMARY
[___] : [unfilled] [No Ischemia] : no Ischemia [LVEF ___%] : LVEF [unfilled]% [Normal] : normal LA size [None] : no mitral regurgitation

## 2021-05-14 DIAGNOSIS — K31.7 POLYP OF STOMACH AND DUODENUM: ICD-10-CM

## 2021-05-19 ENCOUNTER — NON-APPOINTMENT (OUTPATIENT)
Age: 61
End: 2021-05-19

## 2021-05-19 ENCOUNTER — APPOINTMENT (OUTPATIENT)
Dept: DERMATOLOGY | Facility: CLINIC | Age: 61
End: 2021-05-19
Payer: COMMERCIAL

## 2021-05-19 VITALS — BODY MASS INDEX: 25.99 KG/M2 | HEIGHT: 65 IN | WEIGHT: 156 LBS

## 2021-05-19 PROCEDURE — 99203 OFFICE O/P NEW LOW 30 MIN: CPT

## 2021-05-19 PROCEDURE — 99072 ADDL SUPL MATRL&STAF TM PHE: CPT

## 2021-05-20 ENCOUNTER — APPOINTMENT (OUTPATIENT)
Dept: ELECTROPHYSIOLOGY | Facility: CLINIC | Age: 61
End: 2021-05-20
Payer: COMMERCIAL

## 2021-05-20 PROCEDURE — 99072 ADDL SUPL MATRL&STAF TM PHE: CPT

## 2021-05-20 PROCEDURE — 93280 PM DEVICE PROGR EVAL DUAL: CPT

## 2021-06-01 ENCOUNTER — APPOINTMENT (OUTPATIENT)
Dept: DISASTER EMERGENCY | Facility: CLINIC | Age: 61
End: 2021-06-01

## 2021-06-04 ENCOUNTER — OUTPATIENT (OUTPATIENT)
Dept: OUTPATIENT SERVICES | Facility: HOSPITAL | Age: 61
LOS: 1 days | End: 2021-06-04
Payer: COMMERCIAL

## 2021-06-04 VITALS
SYSTOLIC BLOOD PRESSURE: 122 MMHG | WEIGHT: 156.97 LBS | DIASTOLIC BLOOD PRESSURE: 78 MMHG | TEMPERATURE: 98 F | RESPIRATION RATE: 14 BRPM | OXYGEN SATURATION: 99 % | HEIGHT: 65 IN | HEART RATE: 64 BPM

## 2021-06-04 DIAGNOSIS — Z98.890 OTHER SPECIFIED POSTPROCEDURAL STATES: Chronic | ICD-10-CM

## 2021-06-04 DIAGNOSIS — Z98.1 ARTHRODESIS STATUS: Chronic | ICD-10-CM

## 2021-06-04 DIAGNOSIS — G47.33 OBSTRUCTIVE SLEEP APNEA (ADULT) (PEDIATRIC): ICD-10-CM

## 2021-06-04 DIAGNOSIS — K31.7 POLYP OF STOMACH AND DUODENUM: ICD-10-CM

## 2021-06-04 DIAGNOSIS — Z01.818 ENCOUNTER FOR OTHER PREPROCEDURAL EXAMINATION: ICD-10-CM

## 2021-06-04 DIAGNOSIS — K82.4 CHOLESTEROLOSIS OF GALLBLADDER: ICD-10-CM

## 2021-06-04 LAB
ANION GAP SERPL CALC-SCNC: 10 MMOL/L — SIGNIFICANT CHANGE UP (ref 5–17)
BUN SERPL-MCNC: 13 MG/DL — SIGNIFICANT CHANGE UP (ref 7–23)
CALCIUM SERPL-MCNC: 9.7 MG/DL — SIGNIFICANT CHANGE UP (ref 8.4–10.5)
CHLORIDE SERPL-SCNC: 106 MMOL/L — SIGNIFICANT CHANGE UP (ref 96–108)
CO2 SERPL-SCNC: 27 MMOL/L — SIGNIFICANT CHANGE UP (ref 22–31)
CREAT SERPL-MCNC: 0.86 MG/DL — SIGNIFICANT CHANGE UP (ref 0.5–1.3)
GLUCOSE SERPL-MCNC: 84 MG/DL — SIGNIFICANT CHANGE UP (ref 70–99)
HCT VFR BLD CALC: 37.4 % — SIGNIFICANT CHANGE UP (ref 34.5–45)
HGB BLD-MCNC: 11.7 G/DL — SIGNIFICANT CHANGE UP (ref 11.5–15.5)
MCHC RBC-ENTMCNC: 29 PG — SIGNIFICANT CHANGE UP (ref 27–34)
MCHC RBC-ENTMCNC: 31.3 GM/DL — LOW (ref 32–36)
MCV RBC AUTO: 92.6 FL — SIGNIFICANT CHANGE UP (ref 80–100)
NRBC # BLD: 0 /100 WBCS — SIGNIFICANT CHANGE UP (ref 0–0)
PLATELET # BLD AUTO: 233 K/UL — SIGNIFICANT CHANGE UP (ref 150–400)
POTASSIUM SERPL-MCNC: 4.2 MMOL/L — SIGNIFICANT CHANGE UP (ref 3.5–5.3)
POTASSIUM SERPL-SCNC: 4.2 MMOL/L — SIGNIFICANT CHANGE UP (ref 3.5–5.3)
RBC # BLD: 4.04 M/UL — SIGNIFICANT CHANGE UP (ref 3.8–5.2)
RBC # FLD: 13.3 % — SIGNIFICANT CHANGE UP (ref 10.3–14.5)
SODIUM SERPL-SCNC: 143 MMOL/L — SIGNIFICANT CHANGE UP (ref 135–145)
WBC # BLD: 4.87 K/UL — SIGNIFICANT CHANGE UP (ref 3.8–10.5)
WBC # FLD AUTO: 4.87 K/UL — SIGNIFICANT CHANGE UP (ref 3.8–10.5)

## 2021-06-04 PROCEDURE — 80048 BASIC METABOLIC PNL TOTAL CA: CPT

## 2021-06-04 PROCEDURE — G0463: CPT

## 2021-06-04 PROCEDURE — 85027 COMPLETE CBC AUTOMATED: CPT

## 2021-06-04 NOTE — H&P PST ADULT - NSICDXPROBLEM_GEN_ALL_CORE_FT
PROBLEM DIAGNOSES  Problem: Polyp of gallbladder  Assessment and Plan: upper EUS Anes    Problem: REJI (obstructive sleep apnea)  Assessment and Plan: REJI precautions  endo booking notified

## 2021-06-04 NOTE — H&P PST ADULT - COMMENTS
travelled to [place] the past weekend travelled to [place] the past weekend. denies anosmia, fever, cough, myalgia, headache.  PPM on left anterior chest

## 2021-06-04 NOTE — H&P PST ADULT - PAIN CHRONIC, PROFILE
-- Confirmed on CT chest and fluids too small making it unsafe for bedside thoracentesis   -- Given it's bilateral with acute sickness over the past few months, this is likely secondary to hypoalbuminemia vs volume overload from post resuscitation. Cannot effectively rule out parapneumonic effusion but clinical picture with a negative procalcitonin, suggest parapneumonic effusion is low on my differential.   -- Recommend gentle diuresis   -- If cough is more productive with change in color or hypoxia develop then recommend diagnostic thoracentesis by IR to rule out parapneumonic effusion.   -- Outpatient follow up with pulmonary and obtain repeat CT chest in 4-6 weeks    yes

## 2021-06-04 NOTE — H&P PST ADULT - HISTORY OF PRESENT ILLNESS
59 Y/O Female H/O Seizures (last episode 2007), REJI Mild, SSS (s/p Pacemaker insertion and battery change 2004, 2018.  61 Y/O Female H/O Seizures (last episode 2007), REJI Mild, SSS (s/p Pacemaker insertion and battery change 2004, 2018). PMH gallbladder polyp and family h/o pancreatic cancer.  pt presents to PST scheduled for EUS for pancreatic cancer screening and follow up for gallbladder polyp on 6/10.  pt fully vaccinated with covid Moderna vaccine (proof on chart).  s/w Latasha, nurse manager, pt to keep covid test scheduled on 6/7 at [facility].

## 2021-06-04 NOTE — H&P PST ADULT - OTHER CARE PROVIDERS
cardio Jose Francois [phone], sudhir Gr neuro cardio Jose Francois [phone] (last cardio note in [facility] 5/9/2021), sudhir Gr neuro

## 2021-06-04 NOTE — H&P PST ADULT - NSICDXPASTMEDICALHX_GEN_ALL_CORE_FT
PAST MEDICAL HISTORY:  Bradycardia SSS    Cervical disc displacement     Endometriosis, Mild     EP (Epilepsy)  - seizure x 2 - 2008, and in 2010     h/o - left Tennis Elbow     Hx of sleep apnea - had sleep study done in ? 2005 - does not use CPAP machine uses oral appliance    Pacemaker - insertion in 2004     Radiculopathy of cervical region     Sick sinus syndrome

## 2021-06-04 NOTE — H&P PST ADULT - NSICDXPASTSURGICALHX_GEN_ALL_CORE_FT
PAST SURGICAL HISTORY:  h/o cardiac pacemaker insertion - 2004 battery replaced 2017, CRAVE Model L331/ serial [id number], interrogated 12/15/2020    H/O colonoscopy     History of D&C 2012    History of D&C - 15 yrs ago     History of laparoscopy - many yrs ago     S/P cervical spinal fusion C4-5 2019    S/P ear surgery s/p wound closure of left ear s/p mechanical fall

## 2021-06-05 RX ORDER — SODIUM CHLORIDE 9 MG/ML
500 INJECTION INTRAMUSCULAR; INTRAVENOUS; SUBCUTANEOUS
Refills: 0 | Status: DISCONTINUED | OUTPATIENT
Start: 2021-06-10 | End: 2021-06-24

## 2021-06-05 NOTE — PRE PROCEDURE NOTE - PRE PROCEDURE EVALUATION
Attending Physician:     Edison                       Procedure: EGD EUS    Indication for Procedure: pancreas cancer screening  ________________________________________________________  PAST MEDICAL & SURGICAL HISTORY:  EP (Epilepsy)  - seizure x 2 - 2008, and in 2010    Pacemaker - insertion in 2004    Endometriosis, Mild    h/o - left Tennis Elbow    Hx of sleep apnea - had sleep study done in ? 2005 - does not use CPAP machine  uses oral appliance    Bradycardia  SSS    Cervical disc displacement    Radiculopathy of cervical region    Sick sinus syndrome    History of D&amp;C - 15 yrs ago    History of laparoscopy - many yrs ago    h/o cardiac pacemaker insertion - 2004  battery replaced 2017, [employer] Model L331/ serial [id number], interrogated 12/15/2020    H/O colonoscopy    History of D&amp;C  2012    S/P cervical spinal fusion  C4-5 2019    S/P ear surgery  s/p wound closure of left ear s/p mechanical fall      ALLERGIES:  Diflucan (Rash)  Duricef (Unknown)  morphine (Vomiting)  sulfa drugs (Unknown)    HOME MEDICATIONS:  atenolol 25 mg oral tablet: 1 tab(s) orally once a day  lamoTRIgine 100 mg oral tablet: 1 tab(s) orally once a day (in the morning)  lamoTRIgine 150 mg oral tablet: 1 tab(s) orally once a day (at bedtime)    AICD/PPM: [ ] yes   [ ] no    PERTINENT LAB DATA:                        11.7   4.87  )-----------( 233      ( 04 Jun 2021 08:26 )             37.4     06-04    143  |  106  |  13  ----------------------------<  84  4.2   |  27  |  0.86    Ca    9.7      04 Jun 2021 08:26                  PHYSICAL EXAMINATION:    T(C): --  HR: --  BP: --  RR: --  SpO2: --    Constitutional: NAD  HEENT: PERRLA, EOMI,    Neck:  No JVD  Respiratory: CTAB/L  Cardiovascular: S1 and S2  Gastrointestinal: BS+, soft, NT/ND  Extremities: No peripheral edema  Neurological: A/O x 3, no focal deficits  Psychiatric: Normal mood, normal affect  Skin: No rashes    ASA Class: I [ ]  II [ ]  III [ ]  IV [ ]    COMMENTS:    The patient is a suitable candidate for the planned procedure unless box checked [ ]  No, explain:

## 2021-06-06 DIAGNOSIS — Z01.818 ENCOUNTER FOR OTHER PREPROCEDURAL EXAMINATION: ICD-10-CM

## 2021-06-07 ENCOUNTER — APPOINTMENT (OUTPATIENT)
Dept: DISASTER EMERGENCY | Facility: CLINIC | Age: 61
End: 2021-06-07

## 2021-06-07 LAB — SARS-COV-2 N GENE NPH QL NAA+PROBE: NOT DETECTED

## 2021-06-10 ENCOUNTER — OUTPATIENT (OUTPATIENT)
Dept: OUTPATIENT SERVICES | Facility: HOSPITAL | Age: 61
LOS: 1 days | End: 2021-06-10
Payer: COMMERCIAL

## 2021-06-10 ENCOUNTER — RESULT REVIEW (OUTPATIENT)
Age: 61
End: 2021-06-10

## 2021-06-10 ENCOUNTER — APPOINTMENT (OUTPATIENT)
Dept: GASTROENTEROLOGY | Facility: HOSPITAL | Age: 61
End: 2021-06-10

## 2021-06-10 VITALS
TEMPERATURE: 97 F | WEIGHT: 154.1 LBS | RESPIRATION RATE: 11 BRPM | DIASTOLIC BLOOD PRESSURE: 68 MMHG | SYSTOLIC BLOOD PRESSURE: 118 MMHG | HEIGHT: 65 IN | OXYGEN SATURATION: 99 % | HEART RATE: 67 BPM

## 2021-06-10 VITALS
RESPIRATION RATE: 19 BRPM | OXYGEN SATURATION: 98 % | SYSTOLIC BLOOD PRESSURE: 122 MMHG | DIASTOLIC BLOOD PRESSURE: 60 MMHG | HEART RATE: 65 BPM

## 2021-06-10 DIAGNOSIS — Z98.890 OTHER SPECIFIED POSTPROCEDURAL STATES: Chronic | ICD-10-CM

## 2021-06-10 DIAGNOSIS — Z98.1 ARTHRODESIS STATUS: Chronic | ICD-10-CM

## 2021-06-10 DIAGNOSIS — K31.7 POLYP OF STOMACH AND DUODENUM: ICD-10-CM

## 2021-06-10 PROCEDURE — 88305 TISSUE EXAM BY PATHOLOGIST: CPT | Mod: 26

## 2021-06-10 PROCEDURE — 88305 TISSUE EXAM BY PATHOLOGIST: CPT

## 2021-06-10 PROCEDURE — 43259 EGD US EXAM DUODENUM/JEJUNUM: CPT | Mod: GC

## 2021-06-10 PROCEDURE — 43259 EGD US EXAM DUODENUM/JEJUNUM: CPT

## 2021-06-10 PROCEDURE — 43239 EGD BIOPSY SINGLE/MULTIPLE: CPT | Mod: GC

## 2021-06-10 RX ORDER — LAMOTRIGINE 25 MG/1
1 TABLET, ORALLY DISINTEGRATING ORAL
Qty: 0 | Refills: 0 | DISCHARGE

## 2021-06-10 RX ORDER — ATENOLOL 25 MG/1
1 TABLET ORAL
Qty: 0 | Refills: 0 | DISCHARGE

## 2021-06-10 NOTE — ASU PATIENT PROFILE, ADULT - PSH
h/o cardiac pacemaker insertion - 2004  battery replaced 2017, [employer] Model L331/ serial [id number], interrogated 12/15/2020  H/O colonoscopy    History of D&C  2012  History of D&C - 15 yrs ago    History of laparoscopy - many yrs ago    S/P cervical spinal fusion  C4-5 2019  S/P ear surgery  s/p wound closure of left ear s/p mechanical fall

## 2021-06-10 NOTE — ASU PATIENT PROFILE, ADULT - PMH
Bradycardia  SSS  Cervical disc displacement    Endometriosis, Mild    EP (Epilepsy)  - seizure x 2 - 2008, and in 2010    h/o - left Tennis Elbow    Hx of sleep apnea - had sleep study done in ? 2005 - does not use CPAP machine  uses oral appliance  Pacemaker - insertion in 2004    Radiculopathy of cervical region    Sick sinus syndrome

## 2021-07-08 ENCOUNTER — NON-APPOINTMENT (OUTPATIENT)
Age: 61
End: 2021-07-08

## 2021-08-20 PROBLEM — R00.1 BRADYCARDIA, UNSPECIFIED: Chronic | Status: ACTIVE | Noted: 2019-02-12

## 2021-08-31 NOTE — H&P PST ADULT - NS HIV RISK FACTOR NO
Pt aox4. Pt c/o rt elbow pain started yesterday. Pt states hit rt elbow on door knob yesterday morning. Pt c/o pinpoint tenderness to elbow. Pt c/o pain with movement of elbow. Swelling noted to rt elbow.
No, Declined

## 2021-09-10 ENCOUNTER — RESULT REVIEW (OUTPATIENT)
Age: 61
End: 2021-09-10

## 2021-09-27 ENCOUNTER — RX RENEWAL (OUTPATIENT)
Age: 61
End: 2021-09-27

## 2021-09-30 ENCOUNTER — APPOINTMENT (OUTPATIENT)
Dept: OTOLARYNGOLOGY | Facility: CLINIC | Age: 61
End: 2021-09-30
Payer: COMMERCIAL

## 2021-09-30 VITALS
HEART RATE: 67 BPM | WEIGHT: 158 LBS | HEIGHT: 65.5 IN | BODY MASS INDEX: 26.01 KG/M2 | SYSTOLIC BLOOD PRESSURE: 133 MMHG | DIASTOLIC BLOOD PRESSURE: 75 MMHG

## 2021-09-30 PROCEDURE — 31575 DIAGNOSTIC LARYNGOSCOPY: CPT

## 2021-09-30 PROCEDURE — 99203 OFFICE O/P NEW LOW 30 MIN: CPT | Mod: 25

## 2021-09-30 RX ORDER — SODIUM SULFATE, POTASSIUM SULFATE, MAGNESIUM SULFATE 17.5; 3.13; 1.6 G/ML; G/ML; G/ML
17.5-3.13-1.6 SOLUTION, CONCENTRATE ORAL
Qty: 1 | Refills: 0 | Status: COMPLETED | COMMUNITY
Start: 2021-04-30 | End: 2021-09-30

## 2021-09-30 RX ORDER — FLUTICASONE PROPIONATE AND SALMETEROL 250; 50 UG/1; UG/1
250-50 POWDER RESPIRATORY (INHALATION) TWICE DAILY
Qty: 1 | Refills: 0 | Status: COMPLETED | COMMUNITY
Start: 2021-01-21 | End: 2021-09-30

## 2021-09-30 NOTE — HISTORY OF PRESENT ILLNESS
[de-identified] : 60 yr old female c/o sore throat and hoarseness for 5d. Voice seems to be getting worse and pain persists.\par +cough non productive\par -fever, otalgia\par +heartburn, but not recently\par -cigs\par denies voice abuse

## 2021-09-30 NOTE — REVIEW OF SYSTEMS
[Post Nasal Drip] : post nasal drip [Nasal Congestion] : nasal congestion [Problem Snoring] : problem snoring [Sinus Pain] : sinus pain [Sinus Pressure] : sinus pressure [Cough] : cough [Easy Bruising] : a tendency for easy bruising [Negative] : Ear [FreeTextEntry1] : Fatigue, skin/hair changes and strawberry birthmark

## 2021-09-30 NOTE — PHYSICAL EXAM
[Normal] : mucosa is normal [Midline] : trachea located in midline position [de-identified] : mild erythema

## 2021-10-04 ENCOUNTER — APPOINTMENT (OUTPATIENT)
Dept: ORTHOPEDIC SURGERY | Facility: CLINIC | Age: 61
End: 2021-10-04
Payer: COMMERCIAL

## 2021-10-04 VITALS — HEART RATE: 66 BPM | DIASTOLIC BLOOD PRESSURE: 62 MMHG | SYSTOLIC BLOOD PRESSURE: 99 MMHG

## 2021-10-04 DIAGNOSIS — G57.30 LESION OF LATERAL POPLITEAL NERVE, UNSPECIFIED LOWER LIMB: ICD-10-CM

## 2021-10-04 DIAGNOSIS — M22.2X1 PATELLOFEMORAL DISORDERS, RIGHT KNEE: ICD-10-CM

## 2021-10-04 PROCEDURE — 73564 X-RAY EXAM KNEE 4 OR MORE: CPT | Mod: LT

## 2021-10-04 PROCEDURE — 99215 OFFICE O/P EST HI 40 MIN: CPT

## 2021-10-11 ENCOUNTER — APPOINTMENT (OUTPATIENT)
Dept: INTERNAL MEDICINE | Facility: CLINIC | Age: 61
End: 2021-10-11
Payer: COMMERCIAL

## 2021-10-11 VITALS
SYSTOLIC BLOOD PRESSURE: 102 MMHG | BODY MASS INDEX: 26.01 KG/M2 | TEMPERATURE: 98 F | WEIGHT: 158 LBS | OXYGEN SATURATION: 99 % | DIASTOLIC BLOOD PRESSURE: 69 MMHG | HEIGHT: 65.5 IN | RESPIRATION RATE: 18 BRPM | HEART RATE: 65 BPM

## 2021-10-11 LAB
25(OH)D3 SERPL-MCNC: 30.5 NG/ML
ALBUMIN SERPL ELPH-MCNC: 4.4 G/DL
ALP BLD-CCNC: 85 U/L
ALT SERPL-CCNC: 8 U/L
ANION GAP SERPL CALC-SCNC: 13 MMOL/L
APPEARANCE: CLEAR
AST SERPL-CCNC: 19 U/L
BACTERIA: NEGATIVE
BASOPHILS # BLD AUTO: 0.03 K/UL
BASOPHILS NFR BLD AUTO: 0.6 %
BILIRUB SERPL-MCNC: 0.2 MG/DL
BILIRUBIN URINE: NEGATIVE
BLOOD URINE: NEGATIVE
BUN SERPL-MCNC: 15 MG/DL
CALCIUM SERPL-MCNC: 9.5 MG/DL
CHLORIDE SERPL-SCNC: 106 MMOL/L
CHOLEST SERPL-MCNC: 206 MG/DL
CO2 SERPL-SCNC: 24 MMOL/L
COLOR: YELLOW
CREAT SERPL-MCNC: 0.82 MG/DL
EOSINOPHIL # BLD AUTO: 0.08 K/UL
EOSINOPHIL NFR BLD AUTO: 1.7 %
ESTIMATED AVERAGE GLUCOSE: 108 MG/DL
GLUCOSE QUALITATIVE U: NEGATIVE
GLUCOSE SERPL-MCNC: 85 MG/DL
HBA1C MFR BLD HPLC: 5.4 %
HCT VFR BLD CALC: 35.1 %
HDLC SERPL-MCNC: 76 MG/DL
HGB BLD-MCNC: 11.2 G/DL
HYALINE CASTS: 0 /LPF
IMM GRANULOCYTES NFR BLD AUTO: 0.2 %
KETONES URINE: NEGATIVE
LDLC SERPL CALC-MCNC: 116 MG/DL
LEUKOCYTE ESTERASE URINE: NEGATIVE
LYMPHOCYTES # BLD AUTO: 1.6 K/UL
LYMPHOCYTES NFR BLD AUTO: 34.4 %
MAN DIFF?: NORMAL
MCHC RBC-ENTMCNC: 29.6 PG
MCHC RBC-ENTMCNC: 31.9 GM/DL
MCV RBC AUTO: 92.6 FL
MICROSCOPIC-UA: NORMAL
MONOCYTES # BLD AUTO: 0.33 K/UL
MONOCYTES NFR BLD AUTO: 7.1 %
NEUTROPHILS # BLD AUTO: 2.6 K/UL
NEUTROPHILS NFR BLD AUTO: 56 %
NITRITE URINE: NEGATIVE
NONHDLC SERPL-MCNC: 130 MG/DL
PH URINE: 6
PLATELET # BLD AUTO: 253 K/UL
POTASSIUM SERPL-SCNC: 4.5 MMOL/L
PROT SERPL-MCNC: 6.3 G/DL
PROTEIN URINE: NEGATIVE
RBC # BLD: 3.79 M/UL
RBC # FLD: 13.6 %
RED BLOOD CELLS URINE: 1 /HPF
SODIUM SERPL-SCNC: 143 MMOL/L
SPECIFIC GRAVITY URINE: >=1.03
SQUAMOUS EPITHELIAL CELLS: 1 /HPF
TRIGL SERPL-MCNC: 67 MG/DL
TSH SERPL-ACNC: 2.99 UIU/ML
UROBILINOGEN URINE: NORMAL
VIT B12 SERPL-MCNC: 1035 PG/ML
WBC # FLD AUTO: 4.65 K/UL
WHITE BLOOD CELLS URINE: 2 /HPF

## 2021-10-11 PROCEDURE — 99396 PREV VISIT EST AGE 40-64: CPT

## 2021-10-11 PROCEDURE — 99386 PREV VISIT NEW AGE 40-64: CPT

## 2021-10-11 NOTE — HISTORY OF PRESENT ILLNESS
[de-identified] : 57 y/o female present for Annual Physical Exam.\par Pt has been feeling well overall \par diet is fair exercise has been poor

## 2021-10-11 NOTE — PHYSICAL EXAM
[No Acute Distress] : no acute distress [Well Nourished] : well nourished [Well Developed] : well developed [Well-Appearing] : well-appearing [Normal Sclera/Conjunctiva] : normal sclera/conjunctiva [PERRL] : pupils equal round and reactive to light [EOMI] : extraocular movements intact [Normal Outer Ear/Nose] : the outer ears and nose were normal in appearance [Normal Oropharynx] : the oropharynx was normal [No JVD] : no jugular venous distention [No Lymphadenopathy] : no lymphadenopathy [Supple] : supple [Thyroid Normal, No Nodules] : the thyroid was normal and there were no nodules present [No Respiratory Distress] : no respiratory distress  [No Accessory Muscle Use] : no accessory muscle use [Clear to Auscultation] : lungs were clear to auscultation bilaterally [Normal Rate] : normal rate  [Regular Rhythm] : with a regular rhythm [Normal S1, S2] : normal S1 and S2 [No Murmur] : no murmur heard [No Carotid Bruits] : no carotid bruits [No Abdominal Bruit] : a ~M bruit was not heard ~T in the abdomen [No Varicosities] : no varicosities [Pedal Pulses Present] : the pedal pulses are present [No Edema] : there was no peripheral edema [No Palpable Aorta] : no palpable aorta [No Extremity Clubbing/Cyanosis] : no extremity clubbing/cyanosis [Soft] : abdomen soft [Non Tender] : non-tender [Non-distended] : non-distended [No Masses] : no abdominal mass palpated [No HSM] : no HSM [Normal Bowel Sounds] : normal bowel sounds [Normal Posterior Cervical Nodes] : no posterior cervical lymphadenopathy [Normal Anterior Cervical Nodes] : no anterior cervical lymphadenopathy [No CVA Tenderness] : no CVA  tenderness [No Spinal Tenderness] : no spinal tenderness [No Joint Swelling] : no joint swelling [Grossly Normal Strength/Tone] : grossly normal strength/tone [No Rash] : no rash [Coordination Grossly Intact] : coordination grossly intact [Normal Gait] : normal gait [No Focal Deficits] : no focal deficits [Normal Affect] : the affect was normal [Normal Insight/Judgement] : insight and judgment were intact [de-identified] : dryness upper eye  lids

## 2021-10-27 ENCOUNTER — LABORATORY RESULT (OUTPATIENT)
Age: 61
End: 2021-10-27

## 2021-10-27 ENCOUNTER — APPOINTMENT (OUTPATIENT)
Dept: DERMATOLOGY | Facility: CLINIC | Age: 61
End: 2021-10-27
Payer: COMMERCIAL

## 2021-10-27 DIAGNOSIS — Z12.83 ENCOUNTER FOR SCREENING FOR MALIGNANT NEOPLASM OF SKIN: ICD-10-CM

## 2021-10-27 PROCEDURE — 17110 DESTRUCTION B9 LES UP TO 14: CPT | Mod: 59

## 2021-10-27 PROCEDURE — 11102 TANGNTL BX SKIN SINGLE LES: CPT | Mod: 59

## 2021-10-27 PROCEDURE — 99213 OFFICE O/P EST LOW 20 MIN: CPT | Mod: 25

## 2021-10-27 NOTE — HISTORY OF PRESENT ILLNESS
[FreeTextEntry1] : skin check [de-identified] : 60 year old female here for skin check. growth on forehead. several growths on face. multiplying.

## 2021-10-27 NOTE — ASSESSMENT
[FreeTextEntry1] : 1) benign findings as above- education\par \par 2) ISK\par -treated with cautery at setting of 2; SED including burning, scarring, and incomplete tx. patient verbalized understanding\par \par 3) Shave bx location forehead\par diagnosis: r/o VV vs. ISK\par  \par Shave biopsy performed today over above location, risks and benefits discussed including incomplete removal, not enough tissue for diagnosis scarring and infection, informed consent obtained, pictures taken,  cleaned with alcohol and anesthetized with 1%lido+epi, 0.3 cc total, hemostasis obtained with monsels, vaseline and bandaid placed, tolerated well, wound care reviewed, specimen sent to pathology.\par \par

## 2021-10-27 NOTE — PHYSICAL EXAM
[FreeTextEntry3] : AAOx3, pleasant, NAD, no visual lymphadenopathy\par hair, scalp, face, nose, eyelids, ears, lips, oropharynx, neck, chest, abdomen, back, right arm, left arm, nails, and hands examined with all normal findings,\par pertinent findings include:\par \par multiple benign nevi and lentigines\par + inflamed, waxy, keratotic papule on left cheek and forehead\par verrucous plaque on forehead

## 2021-11-08 ENCOUNTER — NON-APPOINTMENT (OUTPATIENT)
Age: 61
End: 2021-11-08

## 2021-11-08 ENCOUNTER — APPOINTMENT (OUTPATIENT)
Dept: ORTHOPEDIC SURGERY | Facility: CLINIC | Age: 61
End: 2021-11-08

## 2021-11-15 ENCOUNTER — APPOINTMENT (OUTPATIENT)
Dept: DISASTER EMERGENCY | Facility: CLINIC | Age: 61
End: 2021-11-15

## 2021-11-15 ENCOUNTER — LABORATORY RESULT (OUTPATIENT)
Age: 61
End: 2021-11-15

## 2021-11-15 ENCOUNTER — APPOINTMENT (OUTPATIENT)
Dept: ORTHOPEDIC SURGERY | Facility: CLINIC | Age: 61
End: 2021-11-15
Payer: COMMERCIAL

## 2021-11-15 VITALS — HEART RATE: 65 BPM | SYSTOLIC BLOOD PRESSURE: 119 MMHG | DIASTOLIC BLOOD PRESSURE: 75 MMHG

## 2021-11-15 PROCEDURE — 99213 OFFICE O/P EST LOW 20 MIN: CPT

## 2021-11-17 ENCOUNTER — TRANSCRIPTION ENCOUNTER (OUTPATIENT)
Age: 61
End: 2021-11-17

## 2021-11-18 ENCOUNTER — OUTPATIENT (OUTPATIENT)
Dept: OUTPATIENT SERVICES | Facility: HOSPITAL | Age: 61
LOS: 1 days | End: 2021-11-18
Payer: COMMERCIAL

## 2021-11-18 ENCOUNTER — RESULT REVIEW (OUTPATIENT)
Age: 61
End: 2021-11-18

## 2021-11-18 ENCOUNTER — APPOINTMENT (OUTPATIENT)
Dept: GASTROENTEROLOGY | Facility: HOSPITAL | Age: 61
End: 2021-11-18

## 2021-11-18 DIAGNOSIS — Z98.890 OTHER SPECIFIED POSTPROCEDURAL STATES: Chronic | ICD-10-CM

## 2021-11-18 DIAGNOSIS — Z98.1 ARTHRODESIS STATUS: Chronic | ICD-10-CM

## 2021-11-18 DIAGNOSIS — D12.6 BENIGN NEOPLASM OF COLON, UNSPECIFIED: ICD-10-CM

## 2021-11-18 PROCEDURE — 45385 COLONOSCOPY W/LESION REMOVAL: CPT | Mod: PT

## 2021-11-18 PROCEDURE — 88305 TISSUE EXAM BY PATHOLOGIST: CPT

## 2021-11-18 PROCEDURE — 45385 COLONOSCOPY W/LESION REMOVAL: CPT

## 2021-11-18 PROCEDURE — 88305 TISSUE EXAM BY PATHOLOGIST: CPT | Mod: 26

## 2021-11-21 ENCOUNTER — NON-APPOINTMENT (OUTPATIENT)
Age: 61
End: 2021-11-21

## 2021-11-29 ENCOUNTER — NON-APPOINTMENT (OUTPATIENT)
Age: 61
End: 2021-11-29

## 2021-12-01 DIAGNOSIS — M25.551 PAIN IN RIGHT HIP: ICD-10-CM

## 2021-12-03 ENCOUNTER — APPOINTMENT (OUTPATIENT)
Dept: ORTHOPEDIC SURGERY | Facility: CLINIC | Age: 61
End: 2021-12-03
Payer: COMMERCIAL

## 2021-12-03 PROCEDURE — 99214 OFFICE O/P EST MOD 30 MIN: CPT

## 2021-12-03 PROCEDURE — 73502 X-RAY EXAM HIP UNI 2-3 VIEWS: CPT

## 2021-12-03 NOTE — PHYSICAL EXAM
[de-identified] : Patient is well nourished, well-developed, in no acute distress, with appropriate mood and affect. The patient is oriented to time, place, and person. Respirations are even and unlabored. Gait evaluation does not reveal a limp. There is no inguinal adenopathy. Examination of the contralateral hip shows normal range of motion, strength, no tenderness, and intact skin. The affected limb is well-perfused and showed 2+ dp/pt pulses, without skin lesions, shows a grossly normal motor and sensory examination. Examination of the hip shows no skin lesions. Hip motion is full and painless from 0-90 degrees extension to flexion, 20 degrees adduction and 20 degrees abduction, and 15 degrees internal and 30 degrees external rotation. Leg lengths are approximately equal. FADIR is negative and NADIRA is positive. Stinchfield test is negative. Both hips are stable and muscle strength is normal with good strength with resisted abduction and adduction. Pedal pulses are palpable. [de-identified] : AP and lateral x-rays of the right hip, pelvis, and femur were ordered and taken in the office and demonstrate mild degenerative joint disease of the hip with joint space narrowing, osteophyte formation, and subchondral sclerosis.\par

## 2021-12-03 NOTE — DISCUSSION/SUMMARY
[de-identified] : This patient is mild right hip osteoarthritis.  The patient is not an appropriate candidate for surgical intervention at this time. An extensive discussion was conducted on the natural history of the disease and the variety of surgical and non-surgical options available to the patient including, but not limited to non-steroidal anti-inflammatory medications, steroid injections, physical therapy, maintenance of ideal body weight, and reduction of activity.  I recommended and prescribed a course of Mobic and physical therapy. The patient will schedule an appointment as needed.\par

## 2021-12-03 NOTE — HISTORY OF PRESENT ILLNESS
[de-identified] : This is very nice 61-year-old female experiencing right hip and groin and thigh pain, which is moderate in intensity.  Pain is been present for 6 months.  The pain mildly limits activities of daily living. Walking tolerance is not reduced.  She has been dealing with patellofemoral syndrome for bilateral knees.  She is doing physical therapy for that but is never done physical therapy for the hip.  Does not use a cane or walker.  Does not take NSAIDs.  The patient denies any radiation of the pain to the feet and it is not associated with numbness, tingling, or weakness.

## 2021-12-06 ENCOUNTER — APPOINTMENT (OUTPATIENT)
Dept: ELECTROPHYSIOLOGY | Facility: CLINIC | Age: 61
End: 2021-12-06
Payer: COMMERCIAL

## 2021-12-06 VITALS — SYSTOLIC BLOOD PRESSURE: 122 MMHG | DIASTOLIC BLOOD PRESSURE: 75 MMHG | RESPIRATION RATE: 14 BRPM | HEART RATE: 66 BPM

## 2021-12-06 PROCEDURE — 93280 PM DEVICE PROGR EVAL DUAL: CPT

## 2021-12-15 ENCOUNTER — APPOINTMENT (OUTPATIENT)
Dept: OTOLARYNGOLOGY | Facility: CLINIC | Age: 61
End: 2021-12-15
Payer: COMMERCIAL

## 2021-12-15 VITALS
SYSTOLIC BLOOD PRESSURE: 131 MMHG | BODY MASS INDEX: 25.52 KG/M2 | HEIGHT: 65.5 IN | DIASTOLIC BLOOD PRESSURE: 76 MMHG | WEIGHT: 155 LBS | HEART RATE: 66 BPM

## 2021-12-15 PROCEDURE — 99214 OFFICE O/P EST MOD 30 MIN: CPT

## 2021-12-15 NOTE — PHYSICAL EXAM
[Midline] : trachea located in midline position [Normal] : no rashes [de-identified] : Bilateral inferior turbinate hypertrophy [FreeTextEntry2] : Bilateral frontal sinus tenderness

## 2021-12-15 NOTE — ASSESSMENT
[FreeTextEntry1] : Gricelda Garcia presents with signs and symptoms of acute sinusitis. This is her first infection this year, no chronic symptoms at baseline. Will treat with oral antibiotics and topical medication.\par \par - Augmentin x 10 days. Side effects were discussed and include but are not limited to nausea, vomiting, diarrhea, and skin rash. She has tolerated this medication previously.\par - Nasal saline sprays TID\par - Afrin 1 spray BID for 48 hours only\par - Fluticasone 2 sprays BID\par - Follow up in 2-3 weeks\par

## 2021-12-15 NOTE — REVIEW OF SYSTEMS
[Post Nasal Drip] : post nasal drip [Nasal Congestion] : nasal congestion [Sinus Pressure] : sinus pressure [Discolored Nasal Discharge] : discolored nasal discharge [Cough] : cough [Negative] : Heme/Lymph

## 2021-12-15 NOTE — HISTORY OF PRESENT ILLNESS
[de-identified] : Gricelda Garcia is a 60 yo female with pacemaker who was last seen in 9/2021 for laryngitis by Dr. Kent who presents for evaluation of sinonasal symptoms. She states that about one week ago, she began experiencing bilateral nasal congestion and sinus pressure. She notes significant postnasal drainage and yellow rhinorrhea. She has been using nasal saline sprays and is taking tylenol. She has not yet been on antibiotics. She denies vision changes or pain/restriction of extraocular movements. She states that this is her first sinus infection this year and denies chronic symptoms at baseline. She denies fevers but has had chills. Covid test was negative.

## 2022-01-04 ENCOUNTER — APPOINTMENT (OUTPATIENT)
Dept: OTOLARYNGOLOGY | Facility: CLINIC | Age: 62
End: 2022-01-04

## 2022-01-25 ENCOUNTER — RX RENEWAL (OUTPATIENT)
Age: 62
End: 2022-01-25

## 2022-01-26 ENCOUNTER — NON-APPOINTMENT (OUTPATIENT)
Age: 62
End: 2022-01-26

## 2022-01-27 ENCOUNTER — NON-APPOINTMENT (OUTPATIENT)
Age: 62
End: 2022-01-27

## 2022-01-27 NOTE — ASU DISCHARGE PLAN (ADULT/PEDIATRIC) - NSDISCH_ACTIVITY_ENDO_ALL_CORE_FT
OARRS reviewed. UDS: + for  Hydrocodone consistent. Last seen: 11/10/2021.  Follow-up:   Future Appointments   Date Time Provider Ezra Macdonald   1/31/2022  9:15 AM GERARDO Ogden CNP [facility]   5/31/2022  8:20 AM GERARDO Maldonado CNP [facility] - [facility]   12/21/2022  8:20 AM GERARDO Maldonado 88 As you may have been given sedative drugs and medications, you should not drive or operate heavy machinery the next 24 hours. You should avoid any heavy lifting, straining or running today. To the extent possible, defer any important decisions for the next 24 hours.

## 2022-02-20 ENCOUNTER — TRANSCRIPTION ENCOUNTER (OUTPATIENT)
Age: 62
End: 2022-02-20

## 2022-02-21 ENCOUNTER — APPOINTMENT (OUTPATIENT)
Dept: INTERNAL MEDICINE | Facility: CLINIC | Age: 62
End: 2022-02-21
Payer: COMMERCIAL

## 2022-02-21 VITALS
TEMPERATURE: 98 F | HEART RATE: 70 BPM | BODY MASS INDEX: 24.99 KG/M2 | WEIGHT: 150 LBS | SYSTOLIC BLOOD PRESSURE: 125 MMHG | OXYGEN SATURATION: 98 % | HEIGHT: 65 IN | DIASTOLIC BLOOD PRESSURE: 70 MMHG | RESPIRATION RATE: 16 BRPM

## 2022-02-21 PROCEDURE — 99214 OFFICE O/P EST MOD 30 MIN: CPT

## 2022-02-21 RX ORDER — FLUTICASONE PROPIONATE 50 UG/1
50 SPRAY, METERED NASAL TWICE DAILY
Qty: 1 | Refills: 1 | Status: COMPLETED | COMMUNITY
Start: 2021-12-15 | End: 2022-02-21

## 2022-02-21 RX ORDER — MELOXICAM 15 MG/1
15 TABLET ORAL
Qty: 14 | Refills: 0 | Status: COMPLETED | COMMUNITY
Start: 2021-10-05 | End: 2022-02-21

## 2022-02-21 RX ORDER — AMOXICILLIN AND CLAVULANATE POTASSIUM 875; 125 MG/1; MG/1
875-125 TABLET, COATED ORAL
Qty: 20 | Refills: 0 | Status: COMPLETED | COMMUNITY
Start: 2021-09-30 | End: 2022-02-21

## 2022-02-21 RX ORDER — AMOXICILLIN AND CLAVULANATE POTASSIUM 875; 125 MG/1; MG/1
875-125 TABLET, COATED ORAL
Qty: 20 | Refills: 0 | Status: COMPLETED | COMMUNITY
Start: 2021-12-15 | End: 2022-02-21

## 2022-02-21 NOTE — REVIEW OF SYSTEMS
[Cough] : cough [Negative] : Cardiovascular [Shortness Of Breath] : no shortness of breath [Wheezing] : no wheezing [Dyspnea on Exertion] : no dyspnea on exertion [FreeTextEntry4] : Nasal congestion

## 2022-02-21 NOTE — PLAN
[FreeTextEntry1] : Patient will start Augmentin and prednisone as given above\par Patient will start Mucinex DM max over-the-counter for her cough she will start Tessalon Perles\par Patient will use Afrin over-the-counter for only 2 to 3 days until she starts getting some relief from antibiotic and prednisone.  Patient has been advised if using Afrin for more than 2 to 3 days she can get rebound effect\par Patient will follow up with me within the next 5 to 7 days if no improvement or worsening symptoms

## 2022-02-21 NOTE — PHYSICAL EXAM
[Normal Oropharynx] : the oropharynx was normal [Normal TMs] : both tympanic membranes were normal [Normal] : no jugular venous distention, supple, no lymphadenopathy and the thyroid was normal and there were no nodules present [Normal S1, S2] : normal S1 and S2 [de-identified] : Nasal mucosa erythema and swelling  [de-identified] : Diffuse rhonchi patient having bronchospasms with deep breaths

## 2022-02-21 NOTE — HISTORY OF PRESENT ILLNESS
[FreeTextEntry8] : 61 year old female here for coughing and chest congestion\par Patient was seen in urgent care yesterday symptoms started on Friday patient has been getting progressively worse\par She was given Tessalon Perles Allegra and ipratropium bromide patient feels her symptoms have been getting worse with medication

## 2022-03-01 ENCOUNTER — RX RENEWAL (OUTPATIENT)
Age: 62
End: 2022-03-01

## 2022-03-05 ENCOUNTER — OUTPATIENT (OUTPATIENT)
Dept: OUTPATIENT SERVICES | Facility: HOSPITAL | Age: 62
LOS: 1 days | End: 2022-03-05
Payer: COMMERCIAL

## 2022-03-05 ENCOUNTER — APPOINTMENT (OUTPATIENT)
Dept: MRI IMAGING | Facility: HOSPITAL | Age: 62
End: 2022-03-05

## 2022-03-05 ENCOUNTER — RESULT REVIEW (OUTPATIENT)
Age: 62
End: 2022-03-05

## 2022-03-05 DIAGNOSIS — Z98.890 OTHER SPECIFIED POSTPROCEDURAL STATES: Chronic | ICD-10-CM

## 2022-03-05 DIAGNOSIS — M22.2X9 PATELLOFEMORAL DISORDERS, UNSPECIFIED KNEE: ICD-10-CM

## 2022-03-05 DIAGNOSIS — G95.9 DISEASE OF SPINAL CORD, UNSPECIFIED: ICD-10-CM

## 2022-03-05 DIAGNOSIS — Z98.1 ARTHRODESIS STATUS: Chronic | ICD-10-CM

## 2022-03-05 PROCEDURE — 73721 MRI JNT OF LWR EXTRE W/O DYE: CPT

## 2022-03-05 PROCEDURE — 73721 MRI JNT OF LWR EXTRE W/O DYE: CPT | Mod: 26,LT

## 2022-03-05 PROCEDURE — 71046 X-RAY EXAM CHEST 2 VIEWS: CPT

## 2022-03-05 PROCEDURE — 93280 PM DEVICE PROGR EVAL DUAL: CPT | Mod: 26,76

## 2022-03-05 PROCEDURE — 71046 X-RAY EXAM CHEST 2 VIEWS: CPT | Mod: 26
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
## 2022-03-15 ENCOUNTER — APPOINTMENT (OUTPATIENT)
Dept: ORTHOPEDIC SURGERY | Facility: CLINIC | Age: 62
End: 2022-03-15
Payer: COMMERCIAL

## 2022-03-15 VITALS — HEART RATE: 64 BPM | SYSTOLIC BLOOD PRESSURE: 120 MMHG | DIASTOLIC BLOOD PRESSURE: 82 MMHG

## 2022-03-15 DIAGNOSIS — M22.2X2 PATELLOFEMORAL DISORDERS, LEFT KNEE: ICD-10-CM

## 2022-03-15 DIAGNOSIS — M23.92 UNSPECIFIED INTERNAL DERANGEMENT OF LEFT KNEE: ICD-10-CM

## 2022-03-15 DIAGNOSIS — S83.249A OTHER TEAR OF MEDIAL MENISCUS, CURRENT INJURY, UNSPECIFIED KNEE, INITIAL ENCOUNTER: ICD-10-CM

## 2022-03-15 PROCEDURE — 20610 DRAIN/INJ JOINT/BURSA W/O US: CPT | Mod: LT

## 2022-03-15 PROCEDURE — 99214 OFFICE O/P EST MOD 30 MIN: CPT | Mod: 25

## 2022-03-15 RX ORDER — LIDOCAINE HYDROCHLORIDE 10 MG/ML
1 INJECTION, SOLUTION INFILTRATION; PERINEURAL
Refills: 0 | Status: COMPLETED | OUTPATIENT
Start: 2022-03-15

## 2022-03-15 RX ORDER — METHYLPRED ACET/NACL,ISO-OS/PF 40 MG/ML
40 VIAL (ML) INJECTION
Qty: 1 | Refills: 0 | Status: COMPLETED | OUTPATIENT
Start: 2022-03-15

## 2022-03-15 RX ADMIN — METHYLPREDNISOLONE ACETATE MG/ML: 40 INJECTION, SUSPENSION INTRA-ARTICULAR; INTRALESIONAL; INTRAMUSCULAR; SOFT TISSUE at 00:00

## 2022-03-15 RX ADMIN — Medication %: at 00:00

## 2022-03-23 ENCOUNTER — APPOINTMENT (OUTPATIENT)
Dept: ORTHOPEDIC SURGERY | Facility: CLINIC | Age: 62
End: 2022-03-23
Payer: COMMERCIAL

## 2022-03-23 PROCEDURE — 99214 OFFICE O/P EST MOD 30 MIN: CPT

## 2022-04-12 ENCOUNTER — OUTPATIENT (OUTPATIENT)
Dept: OUTPATIENT SERVICES | Facility: HOSPITAL | Age: 62
LOS: 1 days | End: 2022-04-12

## 2022-04-12 ENCOUNTER — APPOINTMENT (OUTPATIENT)
Dept: RADIOLOGY | Facility: HOSPITAL | Age: 62
End: 2022-04-12
Payer: COMMERCIAL

## 2022-04-12 ENCOUNTER — APPOINTMENT (OUTPATIENT)
Dept: MRI IMAGING | Facility: HOSPITAL | Age: 62
End: 2022-04-12
Payer: COMMERCIAL

## 2022-04-12 ENCOUNTER — RESULT REVIEW (OUTPATIENT)
Age: 62
End: 2022-04-12

## 2022-04-12 DIAGNOSIS — M47.816 SPONDYLOSIS WITHOUT MYELOPATHY OR RADICULOPATHY, LUMBAR REGION: ICD-10-CM

## 2022-04-12 DIAGNOSIS — M54.16 RADICULOPATHY, LUMBAR REGION: ICD-10-CM

## 2022-04-12 DIAGNOSIS — Z98.890 OTHER SPECIFIED POSTPROCEDURAL STATES: Chronic | ICD-10-CM

## 2022-04-12 DIAGNOSIS — Z98.1 ARTHRODESIS STATUS: Chronic | ICD-10-CM

## 2022-04-12 DIAGNOSIS — M54.50 LOW BACK PAIN, UNSPECIFIED: ICD-10-CM

## 2022-04-12 PROCEDURE — 71046 X-RAY EXAM CHEST 2 VIEWS: CPT | Mod: 26

## 2022-04-12 PROCEDURE — 72148 MRI LUMBAR SPINE W/O DYE: CPT | Mod: 26

## 2022-04-20 ENCOUNTER — APPOINTMENT (OUTPATIENT)
Dept: ORTHOPEDIC SURGERY | Facility: CLINIC | Age: 62
End: 2022-04-20
Payer: COMMERCIAL

## 2022-04-20 VITALS
HEART RATE: 69 BPM | WEIGHT: 150 LBS | SYSTOLIC BLOOD PRESSURE: 133 MMHG | BODY MASS INDEX: 24.99 KG/M2 | HEIGHT: 65 IN | DIASTOLIC BLOOD PRESSURE: 76 MMHG

## 2022-04-20 PROCEDURE — 99214 OFFICE O/P EST MOD 30 MIN: CPT

## 2022-04-20 NOTE — HEALTH RISK ASSESSMENT
Laurent Choi is a 77 year old who has consented to receive services via billable video visit.      Pt will join video visit via: [employer]  If there are problems joining the visit, send backup video invite via: Text to preferred phone: [phone]      Originating Location (patient location): Patient's home  Distant Location (provider location): [facility]    Will anyone else be joining the video visit? No    How would you prefer to obtain AVS?: [employer]   [Patient reported mammogram was normal] : Patient reported mammogram was normal [Patient reported colonoscopy was abnormal] : Patient reported colonoscopy was abnormal [MammogramDate] : 12/18/2019 [MammogramComments] : BI-RADS 2  [ColonoscopyDate] : 04/13/2018 [ColonoscopyComments] : 5mm polyp

## 2022-05-05 ENCOUNTER — APPOINTMENT (OUTPATIENT)
Dept: ORTHOPEDIC SURGERY | Facility: CLINIC | Age: 62
End: 2022-05-05
Payer: COMMERCIAL

## 2022-05-05 VITALS
HEART RATE: 71 BPM | WEIGHT: 150 LBS | BODY MASS INDEX: 24.99 KG/M2 | DIASTOLIC BLOOD PRESSURE: 71 MMHG | HEIGHT: 65 IN | SYSTOLIC BLOOD PRESSURE: 118 MMHG

## 2022-05-05 PROCEDURE — 99214 OFFICE O/P EST MOD 30 MIN: CPT

## 2022-05-05 NOTE — DISCUSSION/SUMMARY
[de-identified] : Left lumbar radiculopathy. \par Discussed all options. \par MDP x 2\par Refer NATHALIE L4-L5 Dr. Bell. 2nd injection.\par 30% better after 1 injection.\par F/u 2 weeks. If improvement, transition to Mobic. \par All options discussed including rest, medicine, home exercise, acupuncture, Chiropractic care, Physical Therapy, Pain management, and last resort surgery. All questions were answered, all alternatives discussed and the patient is in complete agreement with that plan. Follow-up appointment as instructed. Any issues and the patient will call or come in sooner.
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
## 2022-05-05 NOTE — PHYSICAL EXAM
[Normal] : Gait: normal [Burnette's Sign] : negative Burnette's sign [Pronator Drift] : negative pronator drift [SLR] : negative straight leg raise [de-identified] : 5 out of 5 motor strength, sensation is intact and symmetrical full range of motion flexion extension and rotation, no palpatory tenderness full range of motion of hips knees shoulders and elbows (all four extremities), no atrophy, negative straight leg raise, no pathological reflexes, no swelling, normal ambulation, no apparent distress skin is intact, no palpable lymph nodes, no upper or lower extremity instability, alert and oriented x3 and normal mood. Normal finger-to nose test. Pain with ROM bilateral hips.  [de-identified] : MR SPINE LUMBAR   04/12/2022    (PACS) \par \par FINDINGS:\par \par SPINAL SEGMENTATION: The study assumes 5 non-rib bearing lumbar type vertebral bodies.\par SPINAL ALIGNMENT: Grade 1 retrolisthesis of L5 on S1. Maintained lumbar lordosis.\par MARROW: Preserved vertebral body heights. Normal marrow signal.\par DISTAL CORD AND CONUS: The conus terminates at the level of L2. Normal signal in the cord and distal conus. Normal cauda equina morphology.\par DISC SPACES: Disc desiccation most prominent at L5-S1.\par PARASPINAL MUSCLE AND SOFT TISSUES: Moderate paraspinal muscular atrophy\par INTRAABDOMINAL/INTRAPELVIC SOFT TISSUES: Unremarkable.\par \par DISC LEVEL EVALUATION:\par \par T12/L1: No spinal canal or neural foraminal stenosis.\par L1/L2: No spinal canal or neural foraminal stenosis.\par L2/L3: No spinal canal or neural foraminal stenosis.\par L3/L4: No spinal canal or neural foraminal stenosis.\par L4/L5: Small disc bulge and mild bilateral facet arthrosis. No spinal canal or neural foraminal stenosis.\par L5/S1: Grade 1 retrolisthesis in the setting of bilateral facet arthrosis. Central disc protrusion with posterior osseous ridging. These results in severe bilateral neural foraminal stenosis with disc material closely abutting the exiting L5 nerve roots. Mild spinal canal stenosis.\par \par IMPRESSION:\par Lumbar spondylosis most prominent at L5-S1 where there is severe bilateral neural foraminal stenosis and mild spinal canal stenosis.\par \par --- End of Report ---\par \par \par \par XR AP Lat Lumbar 03/23/2022-L5-S1 degenerative disc disease and left hip arthritis-reviewed with patient. \par \par \par \par MR KNEE LT     03/05/2022    (PACS) \par \par \par \par INTERPRETATION: EXAMINATION: MR KNEE LEFT\par \par Localizer: No additional findings.\par \par Joint space and synovium: There is no knee joint effusion. There is a trace popliteal cyst.\par \par Bones and articular cartilage: There is no fracture or bone marrow edema. The alignment is normal. There is mild cartilage thinning diffusely in the medial compartment.\par \par Menisci: The attachment of the posterior horn of the meniscus, there is a tiny, focal tear with an adjacent lobulated parameniscal cyst posterior to PCL measuring approximately 2.1 x 1.9 x 0.7 cm. Lateral meniscus is intact.\par \par Tendons and Ligaments: The anterior and posterior cruciate ligaments are normal. The medial collateral ligament and lateral collateral ligament complex are intact. The extensor mechanism is unremarkable.\par \par \par \par IMPRESSION:\par \par 1. Medial meniscal tear at the root attachment with adjacent parameniscal cyst measuring 2.1 cm. Associated medial compartment mild diffuse cartilage thinning.\par \par 2. Trace popliteal cyst.\par \par --- End of Report ---\par \par

## 2022-06-06 ENCOUNTER — APPOINTMENT (OUTPATIENT)
Dept: ELECTROPHYSIOLOGY | Facility: CLINIC | Age: 62
End: 2022-06-06
Payer: COMMERCIAL

## 2022-06-06 VITALS — RESPIRATION RATE: 14 BRPM | DIASTOLIC BLOOD PRESSURE: 70 MMHG | SYSTOLIC BLOOD PRESSURE: 119 MMHG | HEART RATE: 60 BPM

## 2022-06-06 PROCEDURE — 93280 PM DEVICE PROGR EVAL DUAL: CPT

## 2022-06-06 RX ORDER — AMOXICILLIN AND CLAVULANATE POTASSIUM 875; 125 MG/1; MG/1
875-125 TABLET, COATED ORAL
Qty: 20 | Refills: 0 | Status: DISCONTINUED | COMMUNITY
Start: 2022-02-21 | End: 2022-06-06

## 2022-06-10 ENCOUNTER — APPOINTMENT (OUTPATIENT)
Dept: DERMATOLOGY | Facility: CLINIC | Age: 62
End: 2022-06-10
Payer: COMMERCIAL

## 2022-06-10 PROCEDURE — 99214 OFFICE O/P EST MOD 30 MIN: CPT

## 2022-07-06 ENCOUNTER — APPOINTMENT (OUTPATIENT)
Dept: ORTHOPEDIC SURGERY | Facility: CLINIC | Age: 62
End: 2022-07-06

## 2022-07-06 VITALS
HEIGHT: 65 IN | WEIGHT: 155 LBS | SYSTOLIC BLOOD PRESSURE: 127 MMHG | DIASTOLIC BLOOD PRESSURE: 77 MMHG | BODY MASS INDEX: 25.83 KG/M2 | HEART RATE: 67 BPM

## 2022-07-06 PROCEDURE — 99213 OFFICE O/P EST LOW 20 MIN: CPT

## 2022-07-19 ENCOUNTER — APPOINTMENT (OUTPATIENT)
Dept: DERMATOLOGY | Facility: CLINIC | Age: 62
End: 2022-07-19

## 2022-07-19 DIAGNOSIS — L98.9 DISORDER OF THE SKIN AND SUBCUTANEOUS TISSUE, UNSPECIFIED: ICD-10-CM

## 2022-07-19 DIAGNOSIS — L82.1 OTHER SEBORRHEIC KERATOSIS: ICD-10-CM

## 2022-07-19 PROCEDURE — 99213 OFFICE O/P EST LOW 20 MIN: CPT

## 2022-07-22 ENCOUNTER — APPOINTMENT (OUTPATIENT)
Dept: ORTHOPEDIC SURGERY | Facility: CLINIC | Age: 62
End: 2022-07-22

## 2022-07-22 VITALS
HEIGHT: 65 IN | SYSTOLIC BLOOD PRESSURE: 128 MMHG | DIASTOLIC BLOOD PRESSURE: 79 MMHG | WEIGHT: 159 LBS | HEART RATE: 65 BPM | BODY MASS INDEX: 26.49 KG/M2

## 2022-07-22 PROCEDURE — 99213 OFFICE O/P EST LOW 20 MIN: CPT | Mod: 25

## 2022-07-22 PROCEDURE — 20610 DRAIN/INJ JOINT/BURSA W/O US: CPT | Mod: LT

## 2022-07-25 ENCOUNTER — RX RENEWAL (OUTPATIENT)
Age: 62
End: 2022-07-25

## 2022-07-26 ENCOUNTER — APPOINTMENT (OUTPATIENT)
Dept: OTOLARYNGOLOGY | Facility: CLINIC | Age: 62
End: 2022-07-26

## 2022-07-26 VITALS
DIASTOLIC BLOOD PRESSURE: 69 MMHG | BODY MASS INDEX: 26.17 KG/M2 | WEIGHT: 159 LBS | HEART RATE: 77 BPM | HEIGHT: 65.5 IN | SYSTOLIC BLOOD PRESSURE: 127 MMHG

## 2022-07-26 PROCEDURE — 99213 OFFICE O/P EST LOW 20 MIN: CPT

## 2022-07-26 RX ORDER — PNV NO.95/FERROUS FUM/FOLIC AC 28MG-0.8MG
TABLET ORAL
Refills: 0 | Status: DISCONTINUED | COMMUNITY
End: 2022-07-26

## 2022-07-26 RX ORDER — PREDNISONE 20 MG/1
20 TABLET ORAL
Qty: 10 | Refills: 0 | Status: DISCONTINUED | COMMUNITY
Start: 2022-02-21 | End: 2022-07-26

## 2022-07-26 RX ORDER — HYDROCORTISONE VALERATE 2 MG/G
0.2 CREAM TOPICAL
Qty: 1 | Refills: 0 | Status: DISCONTINUED | COMMUNITY
Start: 2021-10-11 | End: 2022-07-26

## 2022-07-26 RX ORDER — IPRATROPIUM BROMIDE 42 UG/1
0.06 SPRAY NASAL
Qty: 15 | Refills: 0 | Status: DISCONTINUED | COMMUNITY
Start: 2022-02-20

## 2022-07-26 RX ORDER — BENZONATATE 200 MG/1
200 CAPSULE ORAL
Qty: 15 | Refills: 0 | Status: DISCONTINUED | COMMUNITY
Start: 2022-02-20

## 2022-07-26 NOTE — ASSESSMENT
[FreeTextEntry1] : Afrin for 3d, Flonase\par rx Augmentin\par \par wear a mask around grandchildren when they're sick\par f/u prn failure to improve

## 2022-07-26 NOTE — HISTORY OF PRESENT ILLNESS
[de-identified] : 61 yr old female with increasing nasal congestion and yellow mucous for a week.\par +cough for 3d\par ears clogged last night\par -fever\par daughter and granddaughter were sick last week\par home Covid -, PCR pending\par \par +sinusitis 12/2021, rare sinusitis prior

## 2022-07-26 NOTE — PHYSICAL EXAM
[Normal] : mucosa is normal [Midline] : trachea located in midline position [de-identified] : enlarged with erythema

## 2022-07-29 ENCOUNTER — APPOINTMENT (OUTPATIENT)
Dept: ORTHOPEDIC SURGERY | Facility: CLINIC | Age: 62
End: 2022-07-29

## 2022-07-29 VITALS
WEIGHT: 159 LBS | DIASTOLIC BLOOD PRESSURE: 74 MMHG | HEIGHT: 65.5 IN | SYSTOLIC BLOOD PRESSURE: 132 MMHG | BODY MASS INDEX: 26.17 KG/M2 | HEART RATE: 65 BPM

## 2022-07-29 PROCEDURE — 20610 DRAIN/INJ JOINT/BURSA W/O US: CPT | Mod: LT

## 2022-08-01 ENCOUNTER — RX RENEWAL (OUTPATIENT)
Age: 62
End: 2022-08-01

## 2022-08-05 ENCOUNTER — APPOINTMENT (OUTPATIENT)
Dept: ORTHOPEDIC SURGERY | Facility: CLINIC | Age: 62
End: 2022-08-05

## 2022-08-05 VITALS
BODY MASS INDEX: 26.17 KG/M2 | HEART RATE: 64 BPM | DIASTOLIC BLOOD PRESSURE: 75 MMHG | SYSTOLIC BLOOD PRESSURE: 124 MMHG | HEIGHT: 65.5 IN | WEIGHT: 159 LBS

## 2022-08-05 DIAGNOSIS — M94.262 CHONDROMALACIA, LEFT KNEE: ICD-10-CM

## 2022-08-05 DIAGNOSIS — M17.12 UNILATERAL PRIMARY OSTEOARTHRITIS, LEFT KNEE: ICD-10-CM

## 2022-08-05 DIAGNOSIS — M23.204 DERANGEMENT OF UNSPECIFIED MEDIAL MENISCUS DUE TO OLD TEAR OR INJURY, LEFT KNEE: ICD-10-CM

## 2022-08-05 PROCEDURE — 20610 DRAIN/INJ JOINT/BURSA W/O US: CPT | Mod: LT

## 2022-09-09 ENCOUNTER — APPOINTMENT (OUTPATIENT)
Dept: GASTROENTEROLOGY | Facility: CLINIC | Age: 62
End: 2022-09-09

## 2022-09-09 VITALS
WEIGHT: 155 LBS | DIASTOLIC BLOOD PRESSURE: 68 MMHG | SYSTOLIC BLOOD PRESSURE: 108 MMHG | HEART RATE: 66 BPM | BODY MASS INDEX: 25.52 KG/M2 | HEIGHT: 65.5 IN | OXYGEN SATURATION: 98 %

## 2022-09-09 DIAGNOSIS — Z12.11 ENCOUNTER FOR SCREENING FOR MALIGNANT NEOPLASM OF COLON: ICD-10-CM

## 2022-09-09 DIAGNOSIS — Z12.12 ENCOUNTER FOR SCREENING FOR MALIGNANT NEOPLASM OF COLON: ICD-10-CM

## 2022-09-09 PROCEDURE — 99214 OFFICE O/P EST MOD 30 MIN: CPT

## 2022-09-09 NOTE — ASSESSMENT
[FreeTextEntry1] : IMPRESSION:\par #  Due for a screening test for colon polyps/early stage colon cancer\par -  Colonoscopy on 11/2021: 1.2 cm flat polyp in the cecum removed by exacto snare in a piecemeal fashion (sessile serrated adenoma).  Diminutive sigmoid colon polyp removed by exacto snare (prominent lymphoid aggregates).  Few scattered diverticula with an inverted diverticulum in transverse colon.  Small internal hemorrhoids.  Good prep. Repeat in 1 to 2 years. \par  - Colonoscopy in April 2018 - 5 mm polyp in the cecum removed (tubular adenoma) - few sigmoid diverticulosis - repeat colonoscopy 3 years\par - Colonoscopy in 2013 few diverticulosis - repeat exam in 5 years. \par \par #  Gallbladder polyp - due for surveillance \par -  Abdominal ultrasound on 5/2021:  4 mm gallbladder polyp.\par - Abdominal ultrasound in 2/2018 showed a 2 mm gallbladder polyp unchanged since 2016\par \par #  Family Hx of pancreatic cancer - mother at 64 and maternal cousin at age late 40s\par -  EGD/EUS on 6/2021 by Dr. Hogan:  Scattered small fundic gland polyps. Gastric bx showed chronic gastritis negative for HP, IM and autoimmune gastritis.  Normal pancreas. 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 \par \par \par \par \par DISCUSSION/PLAN: \par She is due for a surveillance colonoscopy. I have advised the patient to arrange for a colonoscopy to rule out colon polyps, colorectal cancer etc. under monitored anesthesia care. Risks such as perforation (4 in 10,000) requiring surgery, bleeding (8 in 10,000), infection, diverticulitis, colitis, missed colon cancer (2% to 6%), internal organ injury, etc, risks of bowel prep including colitis, syncope, adverse reaction to medication etc. and risks of anesthesia including cardiopulmonary compromise were discussed with patient. Patient verbalized understanding and agrees to proceed with the planned procedure.\par \par Needs clearance from cardiologist and pacemaker check prior to procedures. \par \par To establish with pancreatic center - will call patient.

## 2022-09-09 NOTE — HISTORY OF PRESENT ILLNESS
[de-identified] : 62 y/o woman with Hx of Obstructive sleep apnea, history of seizures (at age 47 and 49), history of cervical spinal stenosis, and history of syncope at age 44 with documented sinus arrest leading to permanent pacemaker implantation (Guidant insignia dual-chamber) who presents for follow up. \par \par Patient denies having abdominal pain, constipation, diarrhea, loss of appetite, weight loss, melena and hematochezia.\par \par \par \par Initial visit on 2018: \par Pt is referred by her general surgeon for evaluation of RUQ pain. The patient reports since the past 6 weeks she's had a right upper quadrant discomfort. She says it feels like a tenderness that is constant throughout the day but varies in intensity with maximal discomfort rated as 4-5/10 on pain scale. She denies radiation of pain but recently she felt a discomfort in right neck that is new. She recently started taking Nexium which has not helped. She was seen by general surgeon for the pain who ordered an abdominal ultrasound and is currently awaiting a HIDA scan with CCK.\par \par There are no alleviating factors - at times the pain is felt more upon turning or moving. \par \par She is worried about pancreatic cancer. Her mother was diagnosed with pancreatic cancer at 64 and  4 months later. \par \par She has changed her diet, trying to lose weight for an upcoming wedding - since the past 2-3 weeks she's been eating salads.\par \par She says her stools have changed somewhat she tends to be constipated but lately she can have loose stools. She has seen mucus in her stools and her stools have been lighter in color.\par \par On 2018 the patient had blood work revealed a hemoglobin of 10.6 with hematocrit of 34.0. Her liver function profile is normal. Her lipase was normal.\par \par On recent abdominal ultrasound in , she was found to have a 2 mm gallbladder polyp unchanged from ultrasound in 2016. There were no stones seen within the gallbladder. Otherwise normal. \par \par Her last CAT scan of the abdomen and pelvis was performed in 2016 without IV contrast. On the noncontrast study her pancreas looked normal.\par \par In May of 2015, the patient had an upper endoscopy and was found to have a 2 cm hiatal hernia. There was an erosion seen in the gastric body that was biopsied. Duodenal biopsies revealed chronic duodenitis with focal surface gastric metaplasia suggestive of peptic duodenitis. Gastric biopsies revealed mild chronic gastritis negative for H. pylori bacteria and negative for intestinal metaplasia. Esophageal biopsies revealed mild reactive epithelial changes.\par \par On 2013 the patient had a colonoscopy for screening and was found to have moderate internal hemorrhoids. She was found to have few diverticulosis. She was recommended to have a repeat colonoscopy in 5 years.\par \par Other cancers that run in her family her her paternal grandmother had bone cancer. Her Brother  of sudden MI in his 60s. \par \par On 2018 underwent EUS found ot have multiple small polyps in the gastric fundus. Mild duodenitis in the bulb, D2 was normal. Pancreas was normal. There were a couple of small stones in the gallbladder. \par \par Gastric polyp was a foveolar hyperplastic polyp. The background gastric body/fundic mucosa with chronic gastritis negative for H. pylori. Lymphocytes, plasma cells, and eosinophils infiltrate into the entire thickness of the gastric fundic mucosa. These histologic features may be nonspecific changes secondary to the polyp but raise the concern of immune medicated gastritis. \par On 2018 she had a colonoscopy and found to have a 5 mm polyp in the cecum that was removed with cold snare polypectomy. Few sigmoid diverticulosis. Polyp was a tubular adenoma.\par \par May 2018 office visit: \par Patient reports feeling well. She says her RUQ pain has resolved. She offers no other complaints. \par \par Discussion/Summary May 2018 \par Results of serologies for autoimmune gastritis reviewed with patient over the phone. Patient was anemic in  with Hgb of 10.6. Discussed the possibility of CT enterography to evaluated Small bowel since patient not a candidate for capsule. \par \par Will recheck Blood test, and fecal occult blood test - if persistent anemia then CT enterography. \par  \par Discussion/Summary May 2018: \par Discussed recent blood test- In 2018 the patient had blood work revealed a hemoglobin of 10.6 with hematocrit of 34.0. Recent blood test showed H/H of 11.0/34.0, FOBT negative. will hold off on evaluation of small bowel - pt to follow up with PCP, may recheck CBC in 3 months - if persistently anemic without other explanation will consider CT enterography. \par \par \par Office visit 2021: \par Only one member in the family with pancreatic cancer - mom had pancreatic - mother was one of 9 sibling - one or two had cancer - but she is not sure what type of cancer. \par \par Concerned about her esophagus - she gets acid reflux -takes papaya and it helps.\par has burning in chest - since past 4 to 6 months its been getting worse. \par \par No difficulty swallowing, no painful swallowing. Feels an irritation in back of throat. No nausea, no vomiting. No loss of appetite, no weight gain. RUQ pain resolved since last visit - only when she presses her abdomen. No diarrhea. Constipation on and off. Stools are dark at times. No melena and no hematochezia. She is feeling tired - has gained weight. \par Sees cardiology once a year - gets pacemaker checked 4 times a year. \par Brother  of heart attack at age 60. \par \par \par Discussion/Summary 2021: \par Called patient and results of recent ultrasound reviewed \par Gallbladder polyp 4 mm - in 2018 it was 2 mm.\par \par Continued surveillance of gallbladder polyp - repeat exam in one year - she verbalized understanding. \par \par \par Discussion/Summary 2021: \par Maternal cousin and mom have had pancreatic cancer - EUS findings reviewed from 2021 - normal pancreas - recommended yearly observation of pancreas - she can't have MRI because of defibrillator - she will arrange for EUS in 2022. She will arrange for surveillance colonoscopy in 2022. \par \par

## 2022-09-09 NOTE — PHYSICAL EXAM
[General Appearance - Alert] : alert [General Appearance - In No Acute Distress] : in no acute distress [Sclera] : the sclera and conjunctiva were normal [Extraocular Movements] : extraocular movements were intact [Outer Ear] : the ears and nose were normal in appearance [Hearing Threshold Finger Rub Not [place]] : hearing was normal [Neck Appearance] : the appearance of the neck was normal [Neck Cervical Mass (___cm)] : no neck mass was observed [Auscultation Breath Sounds / Voice Sounds] : lungs were clear to auscultation bilaterally [Heart Rate And Rhythm] : heart rate was normal and rhythm regular [Heart Sounds] : normal S1 and S2 [Heart Sounds Gallop] : no gallops [Murmurs] : no murmurs [Heart Sounds Pericardial Friction Rub] : no pericardial rub [Bowel Sounds] : normal bowel sounds [Abdomen Soft] : soft [Abdomen Tenderness] : non-tender [Abdomen Mass (___ Cm)] : no abdominal mass palpated [Abnormal Walk] : normal gait [Nail Clubbing] : no clubbing  or cyanosis of the fingernails [Skin Color & Pigmentation] : normal skin color and pigmentation [] : no rash [Oriented To Time, Place, And Person] : oriented to person, place, and time [Impaired Insight] : insight and judgment were intact [Affect] : the affect was normal

## 2022-09-12 ENCOUNTER — APPOINTMENT (OUTPATIENT)
Dept: MAMMOGRAPHY | Facility: CLINIC | Age: 62
End: 2022-09-12

## 2022-09-12 ENCOUNTER — APPOINTMENT (OUTPATIENT)
Dept: ULTRASOUND IMAGING | Facility: CLINIC | Age: 62
End: 2022-09-12

## 2022-09-12 PROCEDURE — 77063 BREAST TOMOSYNTHESIS BI: CPT

## 2022-09-12 PROCEDURE — 77067 SCR MAMMO BI INCL CAD: CPT

## 2022-09-12 PROCEDURE — 76641 ULTRASOUND BREAST COMPLETE: CPT | Mod: 50

## 2022-09-14 DIAGNOSIS — Z12.11 ENCOUNTER FOR SCREENING FOR MALIGNANT NEOPLASM OF COLON: ICD-10-CM

## 2022-09-16 ENCOUNTER — APPOINTMENT (OUTPATIENT)
Dept: ULTRASOUND IMAGING | Facility: CLINIC | Age: 62
End: 2022-09-16

## 2022-09-23 ENCOUNTER — APPOINTMENT (OUTPATIENT)
Dept: ULTRASOUND IMAGING | Facility: CLINIC | Age: 62
End: 2022-09-23

## 2022-09-23 ENCOUNTER — APPOINTMENT (OUTPATIENT)
Dept: MAMMOGRAPHY | Facility: CLINIC | Age: 62
End: 2022-09-23

## 2022-09-30 ENCOUNTER — APPOINTMENT (OUTPATIENT)
Dept: ULTRASOUND IMAGING | Facility: CLINIC | Age: 62
End: 2022-09-30

## 2022-09-30 ENCOUNTER — APPOINTMENT (OUTPATIENT)
Dept: CARDIOLOGY | Facility: CLINIC | Age: 62
End: 2022-09-30

## 2022-09-30 PROCEDURE — 76700 US EXAM ABDOM COMPLETE: CPT

## 2022-10-03 ENCOUNTER — NON-APPOINTMENT (OUTPATIENT)
Age: 62
End: 2022-10-03

## 2022-10-14 ENCOUNTER — APPOINTMENT (OUTPATIENT)
Dept: INTERNAL MEDICINE | Facility: CLINIC | Age: 62
End: 2022-10-14

## 2022-10-14 VITALS
TEMPERATURE: 98.4 F | SYSTOLIC BLOOD PRESSURE: 118 MMHG | HEART RATE: 65 BPM | BODY MASS INDEX: 26.34 KG/M2 | OXYGEN SATURATION: 98 % | DIASTOLIC BLOOD PRESSURE: 70 MMHG | HEIGHT: 65.5 IN | WEIGHT: 160 LBS

## 2022-10-14 DIAGNOSIS — M25.50 PAIN IN UNSPECIFIED JOINT: ICD-10-CM

## 2022-10-14 PROCEDURE — 36415 COLL VENOUS BLD VENIPUNCTURE: CPT

## 2022-10-14 PROCEDURE — 99396 PREV VISIT EST AGE 40-64: CPT | Mod: 25

## 2022-10-15 NOTE — HISTORY OF PRESENT ILLNESS
[FreeTextEntry1] : 60 y/o female presents to the office today for a physical exam with fasting labs.  [de-identified] : Pt presents to the office today for CPE and FBW \par She has been suffering with lower back pain and has seen spine surgeon and pain management.  She has had 2 epidurals done.\par She is compliant with medications and specialist follow ups.\par Diet is good.\par She has up coming appointment with new cardiologist

## 2022-10-15 NOTE — PHYSICAL EXAM
[No Focal Deficits] : no focal deficits [Normal Gait] : normal gait [Normal] : affect was normal and insight and judgment were intact

## 2022-10-15 NOTE — PLAN
[FreeTextEntry1] : FBW done in office today\par Mammo and US up to date\par Colonoscopy needed\par Follow up with pain management for possible 3rd epidural \par Spine surgeon or neurosurgeon consult

## 2022-10-17 ENCOUNTER — APPOINTMENT (OUTPATIENT)
Dept: CARDIOLOGY | Facility: CLINIC | Age: 62
End: 2022-10-17

## 2022-10-17 VITALS — SYSTOLIC BLOOD PRESSURE: 127 MMHG | OXYGEN SATURATION: 97 % | HEART RATE: 67 BPM | DIASTOLIC BLOOD PRESSURE: 74 MMHG

## 2022-10-17 PROCEDURE — 93000 ELECTROCARDIOGRAM COMPLETE: CPT

## 2022-10-17 PROCEDURE — 99214 OFFICE O/P EST MOD 30 MIN: CPT

## 2022-10-20 ENCOUNTER — APPOINTMENT (OUTPATIENT)
Dept: DERMATOLOGY | Facility: CLINIC | Age: 62
End: 2022-10-20

## 2022-10-20 PROCEDURE — 99214 OFFICE O/P EST MOD 30 MIN: CPT

## 2022-10-23 ENCOUNTER — TRANSCRIPTION ENCOUNTER (OUTPATIENT)
Age: 62
End: 2022-10-23

## 2022-10-23 LAB
25(OH)D3 SERPL-MCNC: 31.7 NG/ML
ALBUMIN SERPL ELPH-MCNC: 4.7 G/DL
ALP BLD-CCNC: 92 U/L
ALT SERPL-CCNC: 10 U/L
ANA SER IF-ACNC: NEGATIVE
ANION GAP SERPL CALC-SCNC: 10 MMOL/L
APPEARANCE: CLEAR
AST SERPL-CCNC: 18 U/L
BACTERIA: NEGATIVE
BASOPHILS # BLD AUTO: 0.03 K/UL
BASOPHILS NFR BLD AUTO: 0.6 %
BILIRUB SERPL-MCNC: 0.2 MG/DL
BILIRUBIN URINE: NEGATIVE
BLOOD URINE: NEGATIVE
BUN SERPL-MCNC: 15 MG/DL
CALCIUM SERPL-MCNC: 9.7 MG/DL
CHLORIDE SERPL-SCNC: 107 MMOL/L
CHOLEST SERPL-MCNC: 197 MG/DL
CO2 SERPL-SCNC: 26 MMOL/L
COLOR: NORMAL
CREAT SERPL-MCNC: 0.85 MG/DL
CRP SERPL-MCNC: <3 MG/L
EGFR: 79 ML/MIN/1.73M2
EOSINOPHIL # BLD AUTO: 0.06 K/UL
EOSINOPHIL NFR BLD AUTO: 1.2 %
ERYTHROCYTE [SEDIMENTATION RATE] IN BLOOD BY WESTERGREN METHOD: 11 MM/HR
ESTIMATED AVERAGE GLUCOSE: 117 MG/DL
GLUCOSE QUALITATIVE U: NEGATIVE
GLUCOSE SERPL-MCNC: 93 MG/DL
HBA1C MFR BLD HPLC: 5.7 %
HCT VFR BLD CALC: 40.1 %
HDLC SERPL-MCNC: 73 MG/DL
HGB BLD-MCNC: 12.7 G/DL
IMM GRANULOCYTES NFR BLD AUTO: 0.2 %
KETONES URINE: NEGATIVE
LDLC SERPL CALC-MCNC: 112 MG/DL
LEUKOCYTE ESTERASE URINE: NEGATIVE
LYMPHOCYTES # BLD AUTO: 1.67 K/UL
LYMPHOCYTES NFR BLD AUTO: 33 %
MAN DIFF?: NORMAL
MCHC RBC-ENTMCNC: 29.3 PG
MCHC RBC-ENTMCNC: 31.7 GM/DL
MCV RBC AUTO: 92.6 FL
MICROSCOPIC-UA: NORMAL
MONOCYTES # BLD AUTO: 0.31 K/UL
MONOCYTES NFR BLD AUTO: 6.1 %
NEUTROPHILS # BLD AUTO: 2.98 K/UL
NEUTROPHILS NFR BLD AUTO: 58.9 %
NITRITE URINE: NEGATIVE
NONHDLC SERPL-MCNC: 124 MG/DL
PH URINE: 6.5
PLATELET # BLD AUTO: 246 K/UL
POTASSIUM SERPL-SCNC: 4.8 MMOL/L
PROT SERPL-MCNC: 6.6 G/DL
PROTEIN URINE: NEGATIVE
RBC # BLD: 4.33 M/UL
RBC # FLD: 13.7 %
RED BLOOD CELLS URINE: 0 /HPF
SODIUM SERPL-SCNC: 143 MMOL/L
SPECIFIC GRAVITY URINE: 1.01
SQUAMOUS EPITHELIAL CELLS: 3 /HPF
TRIGL SERPL-MCNC: 59 MG/DL
TSH SERPL-ACNC: 1.79 UIU/ML
UROBILINOGEN URINE: NORMAL
VIT B12 SERPL-MCNC: 634 PG/ML
WBC # FLD AUTO: 5.06 K/UL
WHITE BLOOD CELLS URINE: 0 /HPF

## 2022-11-29 ENCOUNTER — RESULT REVIEW (OUTPATIENT)
Age: 62
End: 2022-11-29

## 2022-11-29 ENCOUNTER — RX RENEWAL (OUTPATIENT)
Age: 62
End: 2022-11-29

## 2022-11-30 ENCOUNTER — NON-APPOINTMENT (OUTPATIENT)
Age: 62
End: 2022-11-30

## 2022-11-30 ENCOUNTER — LABORATORY RESULT (OUTPATIENT)
Age: 62
End: 2022-11-30

## 2022-12-01 ENCOUNTER — APPOINTMENT (OUTPATIENT)
Dept: DERMATOLOGY | Facility: CLINIC | Age: 62
End: 2022-12-01

## 2022-12-01 PROCEDURE — 99214 OFFICE O/P EST MOD 30 MIN: CPT | Mod: 25

## 2022-12-01 PROCEDURE — 11104 PUNCH BX SKIN SINGLE LESION: CPT

## 2022-12-05 ENCOUNTER — APPOINTMENT (OUTPATIENT)
Dept: CT IMAGING | Facility: CLINIC | Age: 62
End: 2022-12-05

## 2022-12-05 PROCEDURE — 74160 CT ABDOMEN W/CONTRAST: CPT

## 2022-12-06 ENCOUNTER — TRANSCRIPTION ENCOUNTER (OUTPATIENT)
Age: 62
End: 2022-12-06

## 2022-12-06 ENCOUNTER — NON-APPOINTMENT (OUTPATIENT)
Age: 62
End: 2022-12-06

## 2022-12-06 ENCOUNTER — APPOINTMENT (OUTPATIENT)
Dept: ELECTROPHYSIOLOGY | Facility: CLINIC | Age: 62
End: 2022-12-06

## 2022-12-06 VITALS — HEART RATE: 65 BPM | SYSTOLIC BLOOD PRESSURE: 115 MMHG | DIASTOLIC BLOOD PRESSURE: 70 MMHG

## 2022-12-06 PROCEDURE — 93280 PM DEVICE PROGR EVAL DUAL: CPT

## 2022-12-06 NOTE — ASSESSMENT
[FreeTextEntry1] : 60 yo w hx of PPM after holter revealed symptomatic SSS.Care provided by [facility] and she has been a pt in device clinic since implant generator change 15 y later.\par \par Other unusual clinical hx of sz - twice , both at night . w/up not revealing but MRI not done bc of implant.\par \par WIll speak w [facility] regarding initial w/up for SSS to better understand etiology of these conditions.
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
## 2022-12-07 ENCOUNTER — OUTPATIENT (OUTPATIENT)
Dept: OUTPATIENT SERVICES | Facility: HOSPITAL | Age: 62
LOS: 1 days | End: 2022-12-07
Payer: COMMERCIAL

## 2022-12-07 ENCOUNTER — APPOINTMENT (OUTPATIENT)
Dept: GASTROENTEROLOGY | Facility: HOSPITAL | Age: 62
End: 2022-12-07

## 2022-12-07 ENCOUNTER — RESULT REVIEW (OUTPATIENT)
Age: 62
End: 2022-12-07

## 2022-12-07 DIAGNOSIS — Z12.12 ENCOUNTER FOR SCREENING FOR MALIGNANT NEOPLASM OF RECTUM: ICD-10-CM

## 2022-12-07 DIAGNOSIS — Z98.890 OTHER SPECIFIED POSTPROCEDURAL STATES: Chronic | ICD-10-CM

## 2022-12-07 DIAGNOSIS — Z98.1 ARTHRODESIS STATUS: Chronic | ICD-10-CM

## 2022-12-07 DIAGNOSIS — Z12.11 ENCOUNTER FOR SCREENING FOR MALIGNANT NEOPLASM OF COLON: ICD-10-CM

## 2022-12-07 PROCEDURE — 45381 COLONOSCOPY SUBMUCOUS NJX: CPT | Mod: PT

## 2022-12-07 PROCEDURE — 45385 COLONOSCOPY W/LESION REMOVAL: CPT | Mod: PT

## 2022-12-07 PROCEDURE — 45380 COLONOSCOPY AND BIOPSY: CPT | Mod: PT,XS

## 2022-12-07 PROCEDURE — C1889: CPT

## 2022-12-07 PROCEDURE — 88305 TISSUE EXAM BY PATHOLOGIST: CPT | Mod: 26

## 2022-12-07 PROCEDURE — 45385 COLONOSCOPY W/LESION REMOVAL: CPT

## 2022-12-07 PROCEDURE — 88305 TISSUE EXAM BY PATHOLOGIST: CPT

## 2022-12-08 ENCOUNTER — OUTPATIENT (OUTPATIENT)
Dept: OUTPATIENT SERVICES | Facility: HOSPITAL | Age: 62
LOS: 1 days | Discharge: HOME | End: 2022-12-08

## 2022-12-08 DIAGNOSIS — Z15.89 GENETIC SUSCEPTIBILITY TO OTHER DISEASE: ICD-10-CM

## 2022-12-08 DIAGNOSIS — Z98.890 OTHER SPECIFIED POSTPROCEDURAL STATES: Chronic | ICD-10-CM

## 2022-12-08 DIAGNOSIS — Z98.1 ARTHRODESIS STATUS: Chronic | ICD-10-CM

## 2022-12-09 ENCOUNTER — APPOINTMENT (OUTPATIENT)
Dept: SURGICAL ONCOLOGY | Facility: CLINIC | Age: 62
End: 2022-12-09
Payer: COMMERCIAL

## 2022-12-09 ENCOUNTER — NON-APPOINTMENT (OUTPATIENT)
Age: 62
End: 2022-12-09

## 2022-12-09 VITALS
HEART RATE: 65 BPM | DIASTOLIC BLOOD PRESSURE: 67 MMHG | BODY MASS INDEX: 26.66 KG/M2 | SYSTOLIC BLOOD PRESSURE: 113 MMHG | OXYGEN SATURATION: 98 % | RESPIRATION RATE: 16 BRPM | HEIGHT: 65 IN | WEIGHT: 160 LBS

## 2022-12-09 PROCEDURE — 99202 OFFICE O/P NEW SF 15 MIN: CPT

## 2022-12-12 ENCOUNTER — NON-APPOINTMENT (OUTPATIENT)
Age: 62
End: 2022-12-12

## 2022-12-13 ENCOUNTER — LABORATORY RESULT (OUTPATIENT)
Age: 62
End: 2022-12-13

## 2022-12-13 ENCOUNTER — APPOINTMENT (OUTPATIENT)
Dept: HEMATOLOGY ONCOLOGY | Facility: CLINIC | Age: 62
End: 2022-12-13

## 2022-12-15 ENCOUNTER — APPOINTMENT (OUTPATIENT)
Dept: DERMATOLOGY | Facility: CLINIC | Age: 62
End: 2022-12-15

## 2022-12-15 ENCOUNTER — APPOINTMENT (OUTPATIENT)
Dept: GASTROENTEROLOGY | Facility: CLINIC | Age: 62
End: 2022-12-15

## 2022-12-15 VITALS
DIASTOLIC BLOOD PRESSURE: 72 MMHG | SYSTOLIC BLOOD PRESSURE: 127 MMHG | HEIGHT: 65 IN | HEART RATE: 70 BPM | BODY MASS INDEX: 26.66 KG/M2 | WEIGHT: 160 LBS | OXYGEN SATURATION: 97 %

## 2022-12-15 DIAGNOSIS — L30.9 DERMATITIS, UNSPECIFIED: ICD-10-CM

## 2022-12-15 DIAGNOSIS — L85.3 XEROSIS CUTIS: ICD-10-CM

## 2022-12-15 PROCEDURE — 99214 OFFICE O/P EST MOD 30 MIN: CPT

## 2022-12-15 NOTE — ASSESSMENT
[FreeTextEntry1] : IMPRESSION:\par #  High risk adenoma \par -  Colonoscopy on 12/2022:  Cecal polypectomy site normal and biopsied (neg path).  Diminutive ascending colon polyp removed by cold snare (no tissue identified).  Large flat polyp more than 10 mm with similar color as rest of colon with indistinct borders - polyp lifted with ORISE for better view and then removed by cold snare (sessile serrated lesion).  Diminutive rectal polyp removed with cold snare (tubular adenoma).  Scattered right colon diverticulosis.  Mild to moderate sigmoid diverticulosis.  Repeat in one year.  \par -  Colonoscopy on 11/2021: 1.2 cm flat polyp in the cecum removed by exacto snare in a piecemeal fashion (sessile serrated adenoma). Diminutive sigmoid colon polyp removed by exacto snare (prominent lymphoid aggregates). Few scattered diverticula with an inverted diverticulum in transverse colon. Small internal hemorrhoids. Good prep. Repeat in 1 to 2 years. \par  - Colonoscopy in April 2018 - 5 mm polyp in the cecum removed (tubular adenoma) - few sigmoid diverticulosis - repeat colonoscopy 3 years\par -  Colonoscopy in 2013 few diverticulosis - repeat exam in 5 years. \par \par #  Family Hx of pancreatic cancer - Follows with pancreatic center \par -  Mother at 64 and maternal cousin at age late 40s \par -  CT scan of the abd with IV contrast on 12/2022:  Right hepatic cyst at 1.4 cm.  Normal pancreas.  Mild atherosclerotic calcifications.  \par - EGD/EUS on 6/2021 by Dr. Hogan: Scattered small fundic gland polyps. Gastric bx showed chronic gastritis negative for HP, IM and autoimmune gastritis. Normal pancreas. \par - EGD/EUS in 2018: Gastric polyps in fundus with bx showing foveolar hyperplastic polyp - histology features of gastric polyp may be nonspecific changes secondary to the polyp but raise the concern of autoimmune gastritis as per pathologist - Antiparietal cell antibody and intrinsic factor blocking antibody were negative in 2018\par - CT scan of the abd/pelvis without IV contrast on 2016 showed normal pancreas\par \par #  Gallbladder polyp - small \par -  Abdominal ultrasound on 10/2022:  3 mm GB polyp.  \par -  Abdominal ultrasound on 5/2021: 4 mm gallbladder polyp.\par -  Abdominal ultrasound in 2/2018 showed a 2 mm gallbladder polyp unchanged since 2016\par \par #  Comorbidities: Obstructive sleep apnea, seizures (at age 47 and 49), and history of syncope at age 44 with documented sinus arrest leading to permanent pacemaker implantation (Guidant insignia dual-chamber). \par \par \par \par \par DISCUSSION/PLAN: \par We discussed possible future clinical diagnosis of Serrated Polyposis Syndrome - to meet criteria she would need a total of 5 or more Serrated lesions proximal to rectum with two being large.  Repeat colonoscopy in one year.  Follow up in office 2 to 3 month prior. \par \par Advised children to start screening exams for colon polyps/lesions at age 40. \par \par \par \par \par

## 2022-12-15 NOTE — HISTORY OF PRESENT ILLNESS
[FreeTextEntry1] : 61 y/o woman with Hx of Obstructive sleep apnea, history of seizures (at age 47 and 49), history of cervical spinal stenosis, and history of syncope at age 44 with documented sinus arrest leading to permanent pacemaker implantation (Guidant insignia dual-chamber) who presents for follow up after recent colonoscopy. \par \par Feels well.  \par \par Maternal grandparent had throat or esophageal cancer - she is currently undergoing genetic testing.  Has followed up with Dr. Carter Mabry given family history of pancreatic cancer and will continue to follow up with pancreatic clinic. \par \par \par Initial visit on 2018: \par Pt is referred by her general surgeon for evaluation of RUQ pain. The patient reports since the past 6 weeks she's had a right upper quadrant discomfort. She says it feels like a tenderness that is constant throughout the day but varies in intensity with maximal discomfort rated as 4-5/10 on pain scale. She denies radiation of pain but recently she felt a discomfort in right neck that is new. She recently started taking Nexium which has not helped. She was seen by general surgeon for the pain who ordered an abdominal ultrasound and is currently awaiting a HIDA scan with [facility].\par \par There are no alleviating factors - at times the pain is felt more upon turning or moving. \par \par She is worried about pancreatic cancer. Her mother was diagnosed with pancreatic cancer at 64 and  4 months later. \par \par She has changed her diet, trying to lose weight for an upcoming wedding - since the past 2-3 weeks she's been eating salads.\par \par She says her stools have changed somewhat she tends to be constipated but lately she can have loose stools. She has seen mucus in her stools and her stools have been lighter in color.\par \par On 2018 the patient had blood work revealed a hemoglobin of 10.6 with hematocrit of 34.0. Her liver function profile is normal. Her lipase was normal.\par \par On recent abdominal ultrasound in , she was found to have a 2 mm gallbladder polyp unchanged from ultrasound in 2016. There were no stones seen within the gallbladder. Otherwise normal. \par \par Her last CAT scan of the abdomen and pelvis was performed in 2016 without IV contrast. On the noncontrast study her pancreas looked normal.\par \par In May of 2015, the patient had an upper endoscopy and was found to have a 2 cm hiatal hernia. There was an erosion seen in the gastric body that was biopsied. Duodenal biopsies revealed chronic duodenitis with focal surface gastric metaplasia suggestive of peptic duodenitis. Gastric biopsies revealed mild chronic gastritis negative for H. pylori bacteria and negative for intestinal metaplasia. Esophageal biopsies revealed mild reactive epithelial changes.\par \par On 2013 the patient had a colonoscopy for screening and was found to have moderate internal hemorrhoids. She was found to have few diverticulosis. She was recommended to have a repeat colonoscopy in 5 years.\par \par Other cancers that run in her family her her paternal grandmother had bone cancer. Her Brother  of sudden MI in his 60s. \par \par On 2018 underwent EUS found ot have multiple small polyps in the gastric fundus. Mild duodenitis in the bulb, D2 was normal. Pancreas was normal. There were a couple of small stones in the gallbladder. \par \par Gastric polyp was a foveolar hyperplastic polyp. The background gastric body/fundic mucosa with chronic gastritis negative for H. pylori. Lymphocytes, plasma cells, and eosinophils infiltrate into the entire thickness of the gastric fundic mucosa. These histologic features may be nonspecific changes secondary to the polyp but raise the concern of immune medicated gastritis. \par On 2018 she had a colonoscopy and found to have a 5 mm polyp in the cecum that was removed with cold snare polypectomy. Few sigmoid diverticulosis. Polyp was a tubular adenoma.\par \par May 2018 office visit: \par Patient reports feeling well. She says her RUQ pain has resolved. She offers no other complaints. \par \par Discussion/Summary May 2018 \par Results of serologies for autoimmune gastritis reviewed with patient over the phone. Patient was anemic in  with Hgb of 10.6. Discussed the possibility of CT enterography to evaluated Small bowel since patient not a candidate for capsule. \par \par Will recheck Blood test, and fecal occult blood test - if persistent anemia then CT enterography. \par  \par Discussion/Summary May 2018: \par Discussed recent blood test- In 2018 the patient had blood work revealed a hemoglobin of 10.6 with hematocrit of 34.0. Recent blood test showed H/H of 11.0/34.0, FOBT negative. will hold off on evaluation of small bowel - pt to follow up with PCP, may recheck CBC in 3 months - if persistently anemic without other explanation will consider CT enterography. \par \par \par Office visit 2021: \par Only one member in the family with pancreatic cancer - mom had pancreatic - mother was one of 9 sibling - one or two had cancer - but she is not sure what type of cancer. \par \par Concerned about her esophagus - she gets acid reflux -takes papaya and it helps.\par has burning in chest - since past 4 to 6 months its been getting worse. \par \par No difficulty swallowing, no painful swallowing. Feels an irritation in back of throat. No nausea, no vomiting. No loss of appetite, no weight gain. RUQ pain resolved since last visit - only when she presses her abdomen. No diarrhea. Constipation on and off. Stools are dark at times. No melena and no hematochezia. She is feeling tired - has gained weight. \par Sees cardiology once a year - gets pacemaker checked 4 times a year. \par Brother  of heart attack at age 60. \par \par \par Discussion/Summary 2021: \par Called patient and results of recent ultrasound reviewed \par Gallbladder polyp 4 mm - in 2018 it was 2 mm.\par \par Continued surveillance of gallbladder polyp - repeat exam in one year - she verbalized understanding. \par \par \par Discussion/Summary 2021: \par Maternal cousin and mom have had pancreatic cancer - EUS findings reviewed from 2021 - normal pancreas - recommended yearly observation of pancreas - she can't have MRI because of defibrillator - she will arrange for EUS in 2022. She will arrange for surveillance colonoscopy in 2022. \par \par Office visit 2022: \par Patient denies having abdominal pain, constipation, diarrhea, loss of appetite, weight loss, melena and hematochezia.\par \par

## 2022-12-19 LAB
C3 SERPL-MCNC: 116 MG/DL
C4 SERPL-MCNC: 30 MG/DL

## 2022-12-20 LAB
ENA RNP AB SER IA-ACNC: 0.5 AL
ENA SM AB SER IA-ACNC: <0.2 AL
ENA SS-A AB SER IA-ACNC: <0.2 AL
ENA SS-B AB SER IA-ACNC: <0.2 AL

## 2022-12-21 ENCOUNTER — RESULT CHARGE (OUTPATIENT)
Age: 62
End: 2022-12-21

## 2022-12-21 LAB
DSDNA AB SER-ACNC: <12 IU/ML
HISTONE AB SER QL: 0.4 UNITS

## 2022-12-22 ENCOUNTER — APPOINTMENT (OUTPATIENT)
Dept: INTERNAL MEDICINE | Facility: CLINIC | Age: 62
End: 2022-12-22

## 2022-12-22 VITALS
BODY MASS INDEX: 26.66 KG/M2 | SYSTOLIC BLOOD PRESSURE: 110 MMHG | WEIGHT: 160 LBS | HEIGHT: 65 IN | DIASTOLIC BLOOD PRESSURE: 70 MMHG | TEMPERATURE: 100.3 F | HEART RATE: 81 BPM | OXYGEN SATURATION: 97 %

## 2022-12-22 LAB
FLUAV SPEC QL CULT: NEGATIVE
FLUBV AG SPEC QL IA: NEGATIVE
SARS-COV-2 AG RESP QL IA.RAPID: NEGATIVE

## 2022-12-22 PROCEDURE — 99213 OFFICE O/P EST LOW 20 MIN: CPT

## 2022-12-22 NOTE — REVIEW OF SYSTEMS
[Nasal Discharge] : nasal discharge [Postnasal Drip] : postnasal drip [Joint Pain] : joint pain [Muscle Pain] : muscle pain [Headache] : headache [Negative] : Heme/Lymph [FreeTextEntry4] : Sinus pressure and congestion

## 2022-12-22 NOTE — HISTORY OF PRESENT ILLNESS
[FreeTextEntry8] : 63 y/o female presents to the office today with congestion and headache as well as fever and chills that started on Monday and has been getting worse since then

## 2022-12-22 NOTE — PHYSICAL EXAM
[Ill-Appearing] : ill-appearing [Normal] : affect was normal and insight and judgment were intact [de-identified] : Nasal mucosa erythema and swelling.  TM intact Bl bulging present no erythema

## 2022-12-22 NOTE — PLAN
[FreeTextEntry1] : Rapid Influenza and COVID negative\par PCR sent out\par Pt will start above medications and will be advised of results once reviewed.\par Fluids rest \par Call with new or worsening symptoms \par

## 2022-12-23 LAB
RAPID RVP RESULT: NOT DETECTED
SARS-COV-2 RNA PNL RESP NAA+PROBE: NOT DETECTED

## 2022-12-23 RX ORDER — AZITHROMYCIN 250 MG/1
250 TABLET, FILM COATED ORAL
Qty: 1 | Refills: 0 | Status: COMPLETED | COMMUNITY
Start: 2022-12-22 | End: 2022-12-23

## 2022-12-28 ENCOUNTER — NON-APPOINTMENT (OUTPATIENT)
Age: 62
End: 2022-12-28
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
## 2023-01-03 NOTE — HISTORY OF PRESENT ILLNESS
[de-identified] : Ms. DANA GROSS is a 62 year old female who presents for initial consultation.  She contacted our [facility] to inquire about pancreas screening.  Her mother unfortunately  of pancreatic cancer (was dx at age 64). She also has a maternal first cousin (male) who has pancreatic cancer.  Additional family cancer history- lung cancer.   She never underwent cancer genetic testing / counseling and is pending appointment with genetics next week. Her mother & cousin w/ PDAC history never had genetic testing.   Patient offers no complaints. Denies any GI symptoms, weight loss, or abdominal pain.  Denies any history of DM.    \par \par PMH:  Obstructive sleep apnea, history of seizures (at age 47 and 49), history of cervical spinal stenosis, and history of syncope at age 44 with documented sinus arrest leading to permanent pacemaker implantation (Guidant insignia dual-chamber)\par \par Patient underwent EUS in the past for pancreas screening (last 6/10/21) showing normal pancreas. Prior to that EUS done 3/6/18, also normal.    She presents today after CT Abdomen performed for screening on 22, demonstrating normal pancreas/ findings.

## 2023-01-03 NOTE — ASSESSMENT
[FreeTextEntry1] : Ms. DANA GROSS is a 62 year old female who presents for initial consultation for pancreas screening. Family ca history of mother w/ PDAC (dx at age 64) and maternal first cousin (male) with PDAC. They never underwent genetic testing.  Patient never had genetic testing / counseling and is pending appointment with genetics next week. Patient offers no complaints.\par She previously underwent EUS in June 2021 showing normal pancreas. She presents today after CT Abdomen performed for screening on 12/5/22, demonstrating normal pancreas/ findings.   \par Discussed that pancreas cancer screening is recommended in her case given she has one first degree relative (mother) and second degree relative (mother's nephew) with PDAC. Also recommend that she sees genetics as planned.   She will follow-up in our pancreas high risk screening clinic in one year, after repeat imaging is performed.  Discussed with patient and she agrees with this plan. \par \par Today, I personally spent 30 minutes in direct face to face time with the patient, of which greater than 50% of the time was spent in patient education and counseling as described above.

## 2023-01-03 NOTE — REASON FOR VISIT
[Follow-up] : a follow-up of an existing diagnosis [Initial Consultation] : an initial consultation for [FreeTextEntry2] : pancreas screening

## 2023-02-21 ENCOUNTER — NON-APPOINTMENT (OUTPATIENT)
Age: 63
End: 2023-02-21

## 2023-02-22 ENCOUNTER — APPOINTMENT (OUTPATIENT)
Dept: DERMATOLOGY | Facility: CLINIC | Age: 63
End: 2023-02-22
Payer: COMMERCIAL

## 2023-02-22 DIAGNOSIS — D18.01 HEMANGIOMA OF SKIN AND SUBCUTANEOUS TISSUE: ICD-10-CM

## 2023-02-22 DIAGNOSIS — L85.3 XEROSIS CUTIS: ICD-10-CM

## 2023-02-22 PROCEDURE — 99213 OFFICE O/P EST LOW 20 MIN: CPT | Mod: 25

## 2023-02-22 PROCEDURE — 11102 TANGNTL BX SKIN SINGLE LES: CPT

## 2023-02-22 NOTE — PHYSICAL EXAM
[FreeTextEntry3] : AAOx3, pleasant, NAD, no visual lymphadenopathy\par hair, scalp, face, nose, eyelids, ears, lips, oropharynx, neck, chest, abdomen, back, right arm, left arm, nails, and hands examined with all normal findings,\par pertinent findings include:\par \par + inflamed, waxy, keratotic papule on lower back

## 2023-02-22 NOTE — ASSESSMENT
[FreeTextEntry1] : 1) benign findings as above- education\par f/u skin check\par \par 2) Shave bx location lower back\par diagnosis: r/o ISK\par  \par Shave biopsy performed today over above location, risks and benefits discussed including incomplete removal, not enough tissue for diagnosis scarring and infection, informed consent obtained, pictures taken,  cleaned with alcohol and anesthetized with 1%lido+epi, 0.3 cc total, hemostasis obtained with light cautery, vaseline and bandaid placed, tolerated well, wound care reviewed, specimen sent to pathology.\par \par

## 2023-02-22 NOTE — HISTORY OF PRESENT ILLNESS
[FreeTextEntry1] : growth on back [de-identified] : 62 year old female here with growth on lower back. bleeding.

## 2023-02-27 ENCOUNTER — NON-APPOINTMENT (OUTPATIENT)
Age: 63
End: 2023-02-27

## 2023-03-01 LAB — DERMATOLOGY BIOPSY: NORMAL

## 2023-03-03 ENCOUNTER — NON-APPOINTMENT (OUTPATIENT)
Age: 63
End: 2023-03-03

## 2023-03-07 ENCOUNTER — NON-APPOINTMENT (OUTPATIENT)
Age: 63
End: 2023-03-07

## 2023-03-08 ENCOUNTER — APPOINTMENT (OUTPATIENT)
Dept: DERMATOLOGY | Facility: CLINIC | Age: 63
End: 2023-03-08
Payer: COMMERCIAL

## 2023-03-08 DIAGNOSIS — L72.3 SEBACEOUS CYST: ICD-10-CM

## 2023-03-08 DIAGNOSIS — R21 RASH AND OTHER NONSPECIFIC SKIN ERUPTION: ICD-10-CM

## 2023-03-08 PROCEDURE — 17110 DESTRUCTION B9 LES UP TO 14: CPT | Mod: 59

## 2023-03-08 PROCEDURE — 11104 PUNCH BX SKIN SINGLE LESION: CPT | Mod: 59

## 2023-03-08 PROCEDURE — 99214 OFFICE O/P EST MOD 30 MIN: CPT | Mod: 25

## 2023-03-08 PROCEDURE — 11900 INJECT SKIN LESIONS </W 7: CPT | Mod: 59

## 2023-03-08 NOTE — ASSESSMENT
[FreeTextEntry1] : 1) benign findings as above- education\par \par 2) rash; discussed non specific biopsy from Dec\par monitor\par \par 3) ISK\par The specified lesions were treated with liquid nitrogen cryotherapy.  Discussed risks including pain, blistering, crusting, discoloration, recurrence.\par \par 4) PUNCH BIOPSY Location right NLF\par Diagnosis:  r/o BCC vs seb derm\par \par 2 mm Punch biopsy performed today over above location, risks and benefits discussed including incomplete removal, not enough tissue for diagnosis scarring and infection, informed consent obtained, lesion cleaned with alcohol and anesthetized with 1% lido+epi, 1 cc total, hemostasis obtained with cautery, vaseline and bandaid placed, tolerated well, wound care reviewed, specimen sent to pathology\par \par 5) inflamed cyst on left breast\par Risks and benefits were discussed including including atrophy, discoloration\par Intralesional triamcinolone, concentration   2.5 mg/cc;\par Total volume    0.1  cc\par Sites: 1\par doxy 100 mg PO BID x 7 days; SED\par \par \par

## 2023-03-08 NOTE — PHYSICAL EXAM
[FreeTextEntry3] : AAOx3, pleasant, NAD, no visual lymphadenopathy\par hair, scalp, face, nose, eyelids, ears, lips, oropharynx, neck, chest, abdomen, back, right arm, left arm, nails, and hands examined with all normal findings,\par pertinent findings include:\par \par multiple benign nevi and lentigines\par resolving xerosis\par red plaque on right NLF\par abscess on left breast\par + inflamed, waxy, keratotic papule on left temple

## 2023-03-08 NOTE — HISTORY OF PRESENT ILLNESS
[FreeTextEntry1] : skin check [de-identified] : 62 year old female here for skin check.\par 1. rash on right NLF. not responding to triamcinolone x1 month.\par 2. spot on left temple. tender\par 3. abscess on left breast

## 2023-03-14 ENCOUNTER — LABORATORY RESULT (OUTPATIENT)
Age: 63
End: 2023-03-14

## 2023-03-14 ENCOUNTER — APPOINTMENT (OUTPATIENT)
Dept: BREAST CENTER | Facility: CLINIC | Age: 63
End: 2023-03-14
Payer: COMMERCIAL

## 2023-03-14 VITALS
HEIGHT: 65.5 IN | SYSTOLIC BLOOD PRESSURE: 130 MMHG | BODY MASS INDEX: 26.34 KG/M2 | HEART RATE: 64 BPM | WEIGHT: 160 LBS | DIASTOLIC BLOOD PRESSURE: 78 MMHG

## 2023-03-14 DIAGNOSIS — Z80.9 FAMILY HISTORY OF MALIGNANT NEOPLASM, UNSPECIFIED: ICD-10-CM

## 2023-03-14 DIAGNOSIS — Z80.0 FAMILY HISTORY OF MALIGNANT NEOPLASM OF DIGESTIVE ORGANS: ICD-10-CM

## 2023-03-14 PROCEDURE — 99202 OFFICE O/P NEW SF 15 MIN: CPT

## 2023-03-14 RX ORDER — FLUTICASONE PROPIONATE 50 UG/1
50 SPRAY, METERED NASAL
Qty: 1 | Refills: 0 | Status: DISCONTINUED | COMMUNITY
Start: 2022-12-22 | End: 2023-03-14

## 2023-03-14 RX ORDER — FLUTICASONE PROPIONATE 50 UG/1
50 SPRAY, METERED NASAL
Qty: 3 | Refills: 3 | Status: DISCONTINUED | COMMUNITY
Start: 2022-07-26 | End: 2023-03-14

## 2023-03-14 RX ORDER — PREDNISONE 20 MG/1
20 TABLET ORAL
Qty: 10 | Refills: 0 | Status: DISCONTINUED | COMMUNITY
Start: 2022-12-22 | End: 2023-03-14

## 2023-03-14 RX ORDER — MELOXICAM 15 MG/1
15 TABLET ORAL
Qty: 30 | Refills: 1 | Status: DISCONTINUED | COMMUNITY
Start: 2021-12-03 | End: 2023-03-14

## 2023-03-14 RX ORDER — MUPIROCIN 20 MG/G
2 OINTMENT TOPICAL
Qty: 1 | Refills: 1 | Status: DISCONTINUED | COMMUNITY
Start: 2022-06-10 | End: 2023-03-14

## 2023-03-14 RX ORDER — HYALURONATE SODIUM 20 MG/2 ML
20 SYRINGE (ML) INTRAARTICULAR
Qty: 1 | Refills: 0 | Status: DISCONTINUED | COMMUNITY
Start: 2022-07-06 | End: 2023-03-14

## 2023-03-14 RX ORDER — AMOXICILLIN AND CLAVULANATE POTASSIUM 875; 125 MG/1; MG/1
875-125 TABLET, COATED ORAL
Qty: 20 | Refills: 0 | Status: DISCONTINUED | COMMUNITY
Start: 2022-12-23 | End: 2023-03-14

## 2023-03-14 NOTE — PAST MEDICAL HISTORY
[Menarche Age ____] : age at menarche was [unfilled] [Total Preg ___] : G[unfilled] [Live Births ___] : P[unfilled]  [Age At Live Birth ___] : Age at live birth: [unfilled] [History of Hormone Replacement Treatment] : has no history of hormone replacement treatment [FreeTextEntry7] : IUD- 5-7 years [FreeTextEntry8] : N/A

## 2023-03-14 NOTE — DATA REVIEWED
[FreeTextEntry1] : 9/12/2022: mammogram /US bilateral - No mammographic of or sono evidence of malignancy.  [id number]

## 2023-03-14 NOTE — HISTORY OF PRESENT ILLNESS
[FreeTextEntry1] : This is a 62 year old patient who calls with a c/o infected cyst of left breast.  She was seen by dermatologist six days ago and had a steroid shot into the lesion and was started on Doxycycline .  She denies fevers, chills but c/o pain at the site which she said has not improved.  She notes the redness has improved somewhat. She denies drainage from the area now but notes it did drain a bit of yellowish fluid three days ago.  \par \par Patient notes she had a surgical excision for a benign lesion in 2014 (Dr. Rubio) but does not recall what it was.  She denies any family history of breast or ovarian cancer .  She is up to date on her breast imaging  with   most recent mammogram in  September 2022.  \par \par Patient has a family h/o of pancreatic cancer in her mother and a maternal 1st cousin.  She has undergone genetic testing / counseling and is followed by pancreatic cancer center. \par \par (LOU T-C v 8 : 12.7 percent lifetime risk )

## 2023-03-14 NOTE — PHYSICAL EXAM
[Normocephalic] : normocephalic [Atraumatic] : atraumatic [Sclera nonicteric] : sclera nonicteric [Conjunctiva pink] : conjunctiva pink [No Supraclavicular Adenopathy] : no supraclavicular adenopathy [No Cervical Adenopathy] : no cervical adenopathy [No JVD] : no jugular venous distension [Clear to Auscultation Bilat] : clear to auscultation bilaterally [Symmetrical] : symmetrical [No dominant masses] : no dominant masses in right breast  [No dominant masses] : no dominant masses left breast [No Nipple Retraction] : no left nipple retraction [No Nipple Discharge] : no left nipple discharge [No Axillary Lymphadenopathy] : no left axillary lymphadenopathy [Soft] : abdomen soft [No Edema] : no edema [No Rashes] : no rashes [No Ulceration] : no ulceration [de-identified] : area of erythema in upper inner quadrant of breast approximately 4 cm in diameter , small pustule/white head in the center mild induration surrounding it , painful to palpation

## 2023-03-15 ENCOUNTER — NON-APPOINTMENT (OUTPATIENT)
Age: 63
End: 2023-03-15

## 2023-03-15 DIAGNOSIS — L21.9 SEBORRHEIC DERMATITIS, UNSPECIFIED: ICD-10-CM

## 2023-03-15 LAB — DERMATOLOGY BIOPSY: NORMAL

## 2023-03-17 ENCOUNTER — APPOINTMENT (OUTPATIENT)
Dept: BREAST CENTER | Facility: CLINIC | Age: 63
End: 2023-03-17
Payer: COMMERCIAL

## 2023-03-17 VITALS
BODY MASS INDEX: 26.34 KG/M2 | HEIGHT: 65.5 IN | WEIGHT: 160 LBS | DIASTOLIC BLOOD PRESSURE: 65 MMHG | HEART RATE: 65 BPM | SYSTOLIC BLOOD PRESSURE: 105 MMHG

## 2023-03-17 DIAGNOSIS — L72.0 EPIDERMAL CYST: ICD-10-CM

## 2023-03-17 DIAGNOSIS — L08.9 EPIDERMAL CYST: ICD-10-CM

## 2023-03-17 PROCEDURE — 99212 OFFICE O/P EST SF 10 MIN: CPT

## 2023-03-17 NOTE — HISTORY OF PRESENT ILLNESS
[FreeTextEntry1] : This is a 62 year old female seen two days ago for an infected cyst of left breast.  She underwent an I&D with placement of packing to the area.  She is here today for wound check.  She is on Docycycline..  She denies fevers, chills or drainage from the area.  Redness has resolved as well.  \par \par

## 2023-03-17 NOTE — PHYSICAL EXAM
[No Rashes] : no rashes [No Ulceration] : no ulceration [de-identified] : area of erythema in upper inner quadrant of breast has resolved , Incision from I&D is C/D/I ,  induration surrounding incision still present but has resolved somewhat

## 2023-04-13 ENCOUNTER — APPOINTMENT (OUTPATIENT)
Dept: INTERNAL MEDICINE | Facility: CLINIC | Age: 63
End: 2023-04-13
Payer: COMMERCIAL

## 2023-04-13 ENCOUNTER — APPOINTMENT (OUTPATIENT)
Dept: OTOLARYNGOLOGY | Facility: CLINIC | Age: 63
End: 2023-04-13
Payer: COMMERCIAL

## 2023-04-13 VITALS
WEIGHT: 160 LBS | HEIGHT: 65.5 IN | DIASTOLIC BLOOD PRESSURE: 70 MMHG | SYSTOLIC BLOOD PRESSURE: 110 MMHG | TEMPERATURE: 98.5 F | HEART RATE: 95 BPM | OXYGEN SATURATION: 98 % | BODY MASS INDEX: 26.34 KG/M2

## 2023-04-13 VITALS
HEART RATE: 86 BPM | WEIGHT: 160 LBS | SYSTOLIC BLOOD PRESSURE: 131 MMHG | BODY MASS INDEX: 26.34 KG/M2 | DIASTOLIC BLOOD PRESSURE: 74 MMHG | HEIGHT: 65.5 IN

## 2023-04-13 PROCEDURE — 99213 OFFICE O/P EST LOW 20 MIN: CPT

## 2023-04-13 RX ORDER — TRIAMCINOLONE ACETONIDE 1 MG/G
0.1 OINTMENT TOPICAL
Qty: 1 | Refills: 1 | Status: DISCONTINUED | COMMUNITY
Start: 2022-12-01 | End: 2023-04-13

## 2023-04-13 RX ORDER — HYDROCORTISONE 25 MG/G
2.5 OINTMENT TOPICAL
Qty: 20 | Refills: 1 | Status: DISCONTINUED | COMMUNITY
Start: 2022-10-20 | End: 2023-04-13

## 2023-04-13 RX ORDER — DOXYCYCLINE HYCLATE 100 MG/1
100 TABLET ORAL TWICE DAILY
Qty: 14 | Refills: 3 | Status: DISCONTINUED | COMMUNITY
Start: 2023-03-08 | End: 2023-04-13

## 2023-04-13 NOTE — HISTORY OF PRESENT ILLNESS
[de-identified] : 62 yr old female c/o lots of otalgia AU,  runny nose and cough productive of discolored mucous since 4/7\par +SOB today\par -fever\par +hx sinusitis\par +bronchitis once last year

## 2023-04-13 NOTE — ASSESSMENT
[FreeTextEntry1] : URI, no evidence of otitis or sinusitis\par rec: f/u with internist for cough, SOB, poss bronchitis\par rx biaxin

## 2023-04-13 NOTE — PLAN
[FreeTextEntry1] : Pt will start medications given above\par She was given Biaxin by ENT that is good for treatment but pt is worried about metallic taste so abx changed to Azithromycin \par Pt will get DayQuil severe cold and flu to further help with symptoms\par Follow up with any new or worsening symptoms

## 2023-04-13 NOTE — PHYSICAL EXAM
[Normal] : mucosa is normal [Midline] : trachea located in midline position [de-identified] : erythema

## 2023-04-13 NOTE — REVIEW OF SYSTEMS
[Nasal Discharge] : nasal discharge [Postnasal Drip] : postnasal drip [Shortness Of Breath] : no shortness of breath [Wheezing] : no wheezing [Cough] : cough [Dyspnea on Exertion] : no dyspnea on exertion [Negative] : Cardiovascular

## 2023-04-13 NOTE — PHYSICAL EXAM
[No Accessory Muscle Use] : no accessory muscle use [Normal] : no posterior cervical lymphadenopathy and no anterior cervical lymphadenopathy [de-identified] : Nasal mucosa erythema and swelling  [de-identified] : mild rhonchi

## 2023-04-13 NOTE — REASON FOR VISIT
[Subsequent Evaluation] : a subsequent evaluation for [FreeTextEntry2] : Sinus infection/Bronchitis

## 2023-04-13 NOTE — HISTORY OF PRESENT ILLNESS
[FreeTextEntry8] : Pt presents to the office today with cough and sinus congestion\par She was at ENT office today and was advised to see PCP for cough\par Cough started on Tue\par No fever or chills no body aches

## 2023-05-02 ENCOUNTER — RX RENEWAL (OUTPATIENT)
Age: 63
End: 2023-05-02

## 2023-05-04 ENCOUNTER — TRANSCRIPTION ENCOUNTER (OUTPATIENT)
Age: 63
End: 2023-05-04

## 2023-05-08 ENCOUNTER — RX CHANGE (OUTPATIENT)
Age: 63
End: 2023-05-08

## 2023-05-17 ENCOUNTER — APPOINTMENT (OUTPATIENT)
Dept: PULMONOLOGY | Facility: CLINIC | Age: 63
End: 2023-05-17
Payer: COMMERCIAL

## 2023-05-17 VITALS
SYSTOLIC BLOOD PRESSURE: 136 MMHG | HEART RATE: 65 BPM | BODY MASS INDEX: 27.83 KG/M2 | DIASTOLIC BLOOD PRESSURE: 72 MMHG | WEIGHT: 165 LBS | OXYGEN SATURATION: 99 % | HEIGHT: 64.4 IN

## 2023-05-17 PROCEDURE — 94729 DIFFUSING CAPACITY: CPT

## 2023-05-17 PROCEDURE — ZZZZZ: CPT

## 2023-05-17 PROCEDURE — 99203 OFFICE O/P NEW LOW 30 MIN: CPT | Mod: 25

## 2023-05-17 PROCEDURE — 94726 PLETHYSMOGRAPHY LUNG VOLUMES: CPT

## 2023-05-17 PROCEDURE — 94060 EVALUATION OF WHEEZING: CPT

## 2023-05-17 RX ORDER — FLUTICASONE PROPIONATE 50 UG/1
50 SPRAY, METERED NASAL TWICE DAILY
Qty: 1 | Refills: 2 | Status: ACTIVE | COMMUNITY
Start: 2023-05-17 | End: 1900-01-01

## 2023-05-17 NOTE — PHYSICAL EXAM
[No Acute Distress] : no acute distress [Normal Rate/Rhythm] : normal rate/rhythm [Normal S1, S2] : normal s1, s2 [No Resp Distress] : no resp distress [Clear to Auscultation Bilaterally] : clear to auscultation bilaterally [Normal Gait] : normal gait [No Clubbing] : no clubbing [No Edema] : no edema [Oriented x3] : oriented x3 [TextBox_11] : Nasal turbinate erythema and edema

## 2023-05-17 NOTE — ASSESSMENT
[FreeTextEntry1] : 61YO Female never smoker PMH seizure (last 2010), sick sinus syndrome s/p PPM and cervical radiculopathy who presents for initial visit for cough and rhinorrhea.\par \par #Upper airway cough syndrome\par #Post Viral Cough\par - Cough likely due to upper airway cough syndrome \par - Nasal turbinates appears edematous and erythematous\par - PFTs without any abnormality\par - Flonase BID\par - Start Asteline nasal spray BID\par - Will trial current regimen for 2 weeks and if no improvement will obtain CXR\par \par Case discussed with Dr. Lisker

## 2023-05-17 NOTE — REVIEW OF SYSTEMS
[Cough] : cough [Sputum] : sputum [Fever] : no fever [Chills] : no chills [Wheezing] : no wheezing [Chest Discomfort] : no chest discomfort [Abdominal Pain] : no abdominal pain

## 2023-05-17 NOTE — HISTORY OF PRESENT ILLNESS
[Never] : never [TextBox_4] : 63YO Female never smoker PMH seizure (last 2010), sick sinus syndrome s/p PPM and cervical radiculopathy who presents for initial visit for cough and rhinorrhea.\par \par She reports having 3 episodes of a URI since Dec 2022 with associated rhinorrhea, cough productive of yellowish sputum. COVID testing and RVP were negative in Dec however she notably felt sick at that time. The cough has gotten worse however the sputum appears to have gotten better. She denies seasonal allergies. The cough is worse laying flat. Her PCP Rx her Fluticasone which she has intermittently taken with some benefit but not lasting. She was seen by ENT who deemed her to have a URI but did not scope her. \par \par Social: Tobacco: never Alcohol: rarely Drugs: never\par Occupation: Administrative \par Exposure: denies\par Surgical Hx: Cervical C4-C5 fusion\par Family Hx: Father: CAD, Mother: Pancreatic ca, Brother: MI, CAD

## 2023-05-25 ENCOUNTER — APPOINTMENT (OUTPATIENT)
Dept: OTOLARYNGOLOGY | Facility: CLINIC | Age: 63
End: 2023-05-25
Payer: COMMERCIAL

## 2023-05-25 VITALS
WEIGHT: 165 LBS | BODY MASS INDEX: 27.49 KG/M2 | TEMPERATURE: 98 F | SYSTOLIC BLOOD PRESSURE: 120 MMHG | DIASTOLIC BLOOD PRESSURE: 79 MMHG | HEART RATE: 65 BPM | HEIGHT: 65 IN

## 2023-05-25 DIAGNOSIS — J06.9 ACUTE UPPER RESPIRATORY INFECTION, UNSPECIFIED: ICD-10-CM

## 2023-05-25 DIAGNOSIS — J40 BRONCHITIS, NOT SPECIFIED AS ACUTE OR CHRONIC: ICD-10-CM

## 2023-05-25 DIAGNOSIS — J98.01 ACUTE BRONCHOSPASM: ICD-10-CM

## 2023-05-25 PROCEDURE — 99214 OFFICE O/P EST MOD 30 MIN: CPT | Mod: 25

## 2023-05-25 PROCEDURE — 31575 DIAGNOSTIC LARYNGOSCOPY: CPT

## 2023-05-25 RX ORDER — OMEPRAZOLE 40 MG/1
40 CAPSULE, DELAYED RELEASE ORAL TWICE DAILY
Qty: 60 | Refills: 2 | Status: ACTIVE | COMMUNITY
Start: 2023-05-25 | End: 1900-01-01

## 2023-05-25 NOTE — REVIEW OF SYSTEMS
Last seen 06/28/2018  Last refill 09/23/2019  Routed to  for sign off. [As Noted in HPI] : as noted in HPI [Negative] : Heme/Lymph 3

## 2023-05-26 ENCOUNTER — NON-APPOINTMENT (OUTPATIENT)
Age: 63
End: 2023-05-26

## 2023-05-26 NOTE — HISTORY OF PRESENT ILLNESS
[de-identified] : 62 year old female presents with persistent cough and post nasal drip for 2 months. She had bronchitis 2 months ago, states shes been getting recurrent bronchitis, 2-3 infections per year usually, took antibiotics but now has cough and post nasal drip that isnt going away. Saw a pulmonologist last week, but lungs were clear. Tried azelastine but didn’t like how she felt on it. Using flonase twice a day for last month. Taking mucinex for cough. \par Patient states she does have a history of sinus infections, 2-3 in last year. Has pressure at her forehead which goes away between infections. \par Took 20mg of prednisone when bronchitis started.

## 2023-05-26 NOTE — CONSULT LETTER
[Please see my note below.] : Please see my note below. [FreeTextEntry1] : Dear Dr. NANCY HARVEY \par I had the pleasure of evaluating your patient DANA GROSS, thank you for allowing us to participate in their care. please see full note detailing our visit below.\par If you have any questions, please do not hesitate to call me and I would be happy to discuss further. \par \par Davis Madden M.D.\par Attending Physician,  \par Department of Otolaryngology - Head and Neck Surgery\par [facility] \par Office: [phone]\par Fax: [phone]\par

## 2023-05-26 NOTE — ASSESSMENT
[FreeTextEntry1] : 62 year old female presents for persistent cough and post nasal drip for 2 months. On exam, thin secretions in nose. Mild reflux visualized at larynx. \par Will proceed to start lifestyle regiment to reduce overproduction of acid and reduce laryngeal reflux including avoiding caffiene, alcohol, eating before bed, peppermint, spicy and fatty foods, and head elevation at night etc. Handout detailing regiment also given.\par - pepcid at bedtime\par - BID PPI\par - tessalon pearls \par - medrol dose pack \par - atrovent for drip\par - follow up in one month

## 2023-05-26 NOTE — PROCEDURE
[FreeTextEntry6] : Procedure performed: Nasal Endoscopy- Diagnostic\par Pre-op indication(s): nasal congestion\par Post-op indication(s): nasal congestion\par Verbal and/or written consent obtained from patient\par Anterior rhinoscopy insufficient to account for symptoms\par Scope #: 3, flexible fiber optic telescope\par The scope was introduced in the nasal passage between the middle and inferior turbinates to exam the inferior portion of the middle meatus and the fontanelle, as well as the maxillary ostia. Next, the scope was passed medically and posteriorly to the middle turbinates to examine the sphenoethmoid recess and the superior turbinate region.\par \par Upon visualization the finders are as follows:\par Nasal Septum: sigmoidal septal deviation\par Bilateral - Mucosa: boggy turbinates, Mucous: scant, Polyp: not seen, Inferior Turbinate: boggy, Middle Turbinate: normal, Superior Turbinate: normal, Inferior Meatus: narrow, Middle Meatus: narrow, Super Meatus: normal, Sphenoethmoidal Recess: clear\par thin secretions [de-identified] : Procedure performed: laryngeal Endoscopy- Diagnostic\par Pre-op/post op indication: dysphonia\par Verbal and/or written consent obtained from patient, Patient was unable to cooperate with mirror\par Scope #: 3, flexible fiber optic telescope used\par \par Scope was introduced through the nose passed on the floor of the nose to the nasopharynx and then followed down the soft palate to the lower pharynx. The tongue Base, Larynx, Hypopharynx were examined. Base of tongue was symmetric, vallecular was clear, epiglottis was not deformed, subglottis/ pyriform and posterior pharyngeal walls were clear. No erythema, edema, pooling of secretions, masses or lesions. Airway patent, no foreign body visualized. Postcricoid area with moderate erythema and mild edema. + intra-artenoid bar. No pooling of secretions. True vocal cords, vestibular folds, ventricles, pyriform sinuses, and aryepiglottic folds appear normal bilaterally. Vocal cords mobile with good contact b/l.\par

## 2023-06-02 ENCOUNTER — APPOINTMENT (OUTPATIENT)
Dept: RADIOLOGY | Facility: CLINIC | Age: 63
End: 2023-06-02
Payer: COMMERCIAL

## 2023-06-02 ENCOUNTER — APPOINTMENT (OUTPATIENT)
Dept: BREAST CENTER | Facility: CLINIC | Age: 63
End: 2023-06-02
Payer: COMMERCIAL

## 2023-06-02 VITALS
SYSTOLIC BLOOD PRESSURE: 114 MMHG | WEIGHT: 165 LBS | HEIGHT: 65 IN | HEART RATE: 75 BPM | DIASTOLIC BLOOD PRESSURE: 69 MMHG | BODY MASS INDEX: 27.49 KG/M2

## 2023-06-02 DIAGNOSIS — Z09 ENCOUNTER FOR FOLLOW-UP EXAMINATION AFTER COMPLETED TREATMENT FOR CONDITIONS OTHER THAN MALIGNANT NEOPLASM: ICD-10-CM

## 2023-06-02 PROCEDURE — 71046 X-RAY EXAM CHEST 2 VIEWS: CPT

## 2023-06-02 PROCEDURE — 99213 OFFICE O/P EST LOW 20 MIN: CPT

## 2023-06-02 RX ORDER — PREDNISONE 20 MG/1
20 TABLET ORAL
Qty: 5 | Refills: 0 | Status: DISCONTINUED | COMMUNITY
Start: 2023-04-13 | End: 2023-06-02

## 2023-06-02 RX ORDER — FLUTICASONE PROPIONATE AND SALMETEROL 250; 50 UG/1; UG/1
250-50 POWDER RESPIRATORY (INHALATION)
Qty: 1 | Refills: 0 | Status: DISCONTINUED | COMMUNITY
Start: 2023-04-13 | End: 2023-06-02

## 2023-06-02 RX ORDER — METHYLPREDNISOLONE 4 MG/1
4 TABLET ORAL
Qty: 1 | Refills: 0 | Status: DISCONTINUED | COMMUNITY
Start: 2023-05-25 | End: 2023-06-02

## 2023-06-02 RX ORDER — AZELASTINE HYDROCHLORIDE 137 UG/1
137 SPRAY, METERED NASAL TWICE DAILY
Qty: 1 | Refills: 2 | Status: DISCONTINUED | COMMUNITY
Start: 2023-05-17 | End: 2023-06-02

## 2023-06-02 RX ORDER — AZITHROMYCIN 500 MG/1
500 TABLET, FILM COATED ORAL DAILY
Qty: 5 | Refills: 0 | Status: DISCONTINUED | COMMUNITY
Start: 2023-04-13 | End: 2023-06-02

## 2023-06-02 RX ORDER — CLARITHROMYCIN 500 MG/1
500 TABLET, FILM COATED ORAL
Qty: 20 | Refills: 0 | Status: DISCONTINUED | COMMUNITY
Start: 2023-04-13 | End: 2023-06-02

## 2023-06-02 NOTE — DATA REVIEWED
[FreeTextEntry1] : Bilateral mammogram and breast ultrasound  9/12/2022:  No mammographic of or sonographic evidence of malignancy.  Left 10 N5 4x3x4 mm subdermal nodule.  BIRADS2\par \par (Images reviewed on PACS.)

## 2023-06-02 NOTE — PHYSICAL EXAM
[Normocephalic] : normocephalic [Atraumatic] : atraumatic [Sclera nonicteric] : sclera nonicteric [Conjunctiva pink] : conjunctiva pink [No Supraclavicular Adenopathy] : no supraclavicular adenopathy [No Cervical Adenopathy] : no cervical adenopathy [No JVD] : no jugular venous distension [Clear to Auscultation Bilat] : clear to auscultation bilaterally [Symmetrical] : symmetrical [No dominant masses] : no dominant masses in right breast  [No Nipple Retraction] : no left nipple retraction [No Nipple Discharge] : no left nipple discharge [No Axillary Lymphadenopathy] : no left axillary lymphadenopathy [Soft] : abdomen soft [No Edema] : no edema [No Rashes] : no rashes [No Ulceration] : no ulceration [EOMI] : extra ocular movement intact [Supple] : supple [de-identified] : 5 mm sebaceous cyst medial breast.

## 2023-06-02 NOTE — REVIEW OF SYSTEMS
[As Noted in HPI] : as noted in HPI [Negative] : Heme/Lymph [FreeTextEntry4] : current postnasal drip/cough

## 2023-06-02 NOTE — HISTORY OF PRESENT ILLNESS
[FreeTextEntry1] : This is a 62 year old female who was seen here by KARLA Justin in March for an infected sebaceous cyst of her left breast.  She was seen by dermatologist six days prior and had a steroid shot into the lesion and was started on Doxycycline .  An I+D was performed on March 14.  She has no current complaints.\par \par The patient had a surgical excision of a benign epidermal inclusion cyst from her right breast in 2014  \par \par Patient has a family h/o of pancreatic cancer in her mother and a maternal 1st cousin.  She has undergone genetic testing / counseling and is followed by pancreatic cancer center. \par \par

## 2023-06-06 ENCOUNTER — LABORATORY RESULT (OUTPATIENT)
Age: 63
End: 2023-06-06

## 2023-06-06 ENCOUNTER — APPOINTMENT (OUTPATIENT)
Dept: ELECTROPHYSIOLOGY | Facility: CLINIC | Age: 63
End: 2023-06-06
Payer: COMMERCIAL

## 2023-06-06 PROCEDURE — 93280 PM DEVICE PROGR EVAL DUAL: CPT

## 2023-06-08 ENCOUNTER — APPOINTMENT (OUTPATIENT)
Dept: BREAST CENTER | Facility: CLINIC | Age: 63
End: 2023-06-08
Payer: COMMERCIAL

## 2023-06-08 ENCOUNTER — LABORATORY RESULT (OUTPATIENT)
Age: 63
End: 2023-06-08

## 2023-06-08 VITALS
DIASTOLIC BLOOD PRESSURE: 66 MMHG | SYSTOLIC BLOOD PRESSURE: 108 MMHG | HEART RATE: 65 BPM | BODY MASS INDEX: 26.66 KG/M2 | WEIGHT: 160 LBS | HEIGHT: 65 IN

## 2023-06-08 DIAGNOSIS — N60.82 OTHER BENIGN MAMMARY DYSPLASIAS OF LEFT BREAST: ICD-10-CM

## 2023-06-08 PROCEDURE — 11401 EXC TR-EXT B9+MARG 0.6-1 CM: CPT

## 2023-06-08 NOTE — PROCEDURE
[FreeTextEntry1] : Excision of left breast sebaceous cyst. [FreeTextEntry2] : Previously infected cyst [FreeTextEntry3] : The left breast was prepped and draped.  Local 1% lidocaine was infiltrated.  A transverse elliptical excision was performed of the small sebaceous cyst at the 9:30-10:00 breast encompassin the overlying skin punctum. The lesion remained intact. The incision was closed with 2 #4-0 nylons.  Telfa and Tegaderm dressing was applied.  The patient tolerated the procedure extremely well.

## 2023-06-09 LAB
A ALTERNATA IGE QN: <0.1 KUA/L
A FUMIGATUS IGE QN: <0.1 KUA/L
C ALBICANS IGE QN: <0.1 KUA/L
C HERBARUM IGE QN: <0.1 KUA/L
CAT DANDER IGE QN: <0.1 KUA/L
COMMON RAGWEED IGE QN: <0.1 KUA/L
D FARINAE IGE QN: <0.1 KUA/L
D PTERONYSS IGE QN: <0.1 KUA/L
DEPRECATED A ALTERNATA IGE RAST QL: 0
DEPRECATED A FUMIGATUS IGE RAST QL: 0
DEPRECATED C ALBICANS IGE RAST QL: 0
DEPRECATED C HERBARUM IGE RAST QL: 0
DEPRECATED CAT DANDER IGE RAST QL: 0
DEPRECATED COMMON RAGWEED IGE RAST QL: 0
DEPRECATED D FARINAE IGE RAST QL: 0
DEPRECATED D PTERONYSS IGE RAST QL: 0
DEPRECATED DOG DANDER IGE RAST QL: 0
DEPRECATED M RACEMOSUS IGE RAST QL: 0
DEPRECATED ROACH IGE RAST QL: 0
DEPRECATED TIMOTHY IGE RAST QL: 0
DEPRECATED WHITE OAK IGE RAST QL: 0
DOG DANDER IGE QN: <0.1 KUA/L
M RACEMOSUS IGE QN: <0.1 KUA/L
ROACH IGE QN: <0.1 KUA/L
TIMOTHY IGE QN: <0.1 KUA/L
TOTAL IGE SMQN RAST: <2 KU/L
WHITE OAK IGE QN: <0.1 KUA/L

## 2023-06-16 ENCOUNTER — APPOINTMENT (OUTPATIENT)
Dept: BREAST CENTER | Facility: CLINIC | Age: 63
End: 2023-06-16
Payer: COMMERCIAL

## 2023-06-16 VITALS
DIASTOLIC BLOOD PRESSURE: 82 MMHG | HEIGHT: 65 IN | HEART RATE: 64 BPM | BODY MASS INDEX: 26.66 KG/M2 | WEIGHT: 160 LBS | SYSTOLIC BLOOD PRESSURE: 135 MMHG

## 2023-06-16 DIAGNOSIS — Z48.02 ENCOUNTER FOR REMOVAL OF SUTURES: ICD-10-CM

## 2023-06-16 PROCEDURE — 99024 POSTOP FOLLOW-UP VISIT: CPT

## 2023-06-16 NOTE — PHYSICAL EXAM
[de-identified] : Small incision healing well medial breast. Sutures removed. Steristrips applied.

## 2023-06-16 NOTE — HISTORY OF PRESENT ILLNESS
[FreeTextEntry1] : This is a 62 year old female who was seen here by KARLA Justin in March for an infected sebaceous cyst of her left breast.  She was seen by dermatologist six days prior and had a steroid shot into the lesion and was started on Doxycycline .  An I+D was performed on March 14.  She has no current complaints.\par \par The patient had a surgical excision of a benign epidermal inclusion cyst from her right breast in 2014  \par \par Patient has a family h/o of pancreatic cancer in her mother and a maternal 1st cousin.  She has undergone genetic testing / counseling and is followed by pancreatic cancer center. \par \par A cyst was removed from the skin of her left breast on 6/8/2023. Pathology shows an epidermoid cyst.\par \par

## 2023-06-16 NOTE — DATA REVIEWED
[FreeTextEntry1] : Bilateral mammogram and breast ultrasound  9/12/2022:  No mammographic of or sonographic evidence of malignancy.  Left 10 N5 4x3x4 mm subdermal nodule.  BIRADS2\par \par Pathology 6/8/2023:  Left breast skin= epidermoid cyst with rupture

## 2023-06-20 ENCOUNTER — APPOINTMENT (OUTPATIENT)
Dept: NEUROLOGY | Facility: CLINIC | Age: 63
End: 2023-06-20
Payer: COMMERCIAL

## 2023-06-20 VITALS
SYSTOLIC BLOOD PRESSURE: 124 MMHG | DIASTOLIC BLOOD PRESSURE: 76 MMHG | WEIGHT: 160 LBS | HEART RATE: 65 BPM | BODY MASS INDEX: 26.66 KG/M2 | HEIGHT: 65 IN

## 2023-06-20 PROCEDURE — 99213 OFFICE O/P EST LOW 20 MIN: CPT

## 2023-06-20 NOTE — HISTORY OF PRESENT ILLNESS
[FreeTextEntry1] : /100, Atenolol\par \par Updates:  no seizures for many years. \par no szs, no ADRs on /150mg low dose.\par \par Recent bronchitis. P steroids, inhalers.\par \par Spurs at the left C5-6 foramen which correlates with  her symptoms of tingling numbness and some shooting pain in the forearm and hand 1st 3 digitis. getting inj to neck.  s/p cervical neck surgery feb 2019. had neck pain, tension her neck, shoulder blades with head turn still.   no perceived loss of strength in hands or biceps.  no recent radiation of pain, numbness to left ear, throat area, no spasms of face.  numbness of feet better since surgery.  \par Less pain in left hip/buttock pain from sleep\par \par Denies migraines in past, occ milder HA with muscle tension in back of head/occiput.  using NSAIDs prn now. no sinus issues. has done accupunture.\par \par On ROS: +/-sleeping OK,  and she has not intentionally lost weight.  Mood is good.  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  She had asystole documented in 2008 and had pacemaker placed at that time, with prior h/o afib.  Describes prior spells of flushing sensation from head to toe with fatigue a few yrs ago.\par She has had no seizures in 5 years, and is driving now.  She is working and is . \par \par She thinks that she has had about three convulsions in her lifetime and cannot count the partial ones, but has not had one in many years.  \par \par Father had seizures after head trauma, and there are no other risk factors.

## 2023-06-20 NOTE — REVIEW OF SYSTEMS
[As Noted in HPI] : as noted in HPI [Arthralgias] : arthralgias [Negative] : Heme/Lymph [de-identified] : She is complaining of loss of taste without loss of smell.   [FreeTextEntry9] : right arm pain

## 2023-06-20 NOTE — PHYSICAL EXAM
[General Appearance - Alert] : alert [General Appearance - In No Acute Distress] : in no acute distress [Oriented To Time, Place, And Person] : oriented to person, place, and time [Impaired Insight] : insight and judgment were intact [Affect] : the affect was normal [Person] : oriented to person [Place] : oriented to place [Time] : oriented to time [Short Term Intact] : short term memory intact [Remote Intact] : remote memory intact [Registration Intact] : recent registration memory intact [Span Intact] : the attention span was normal [Concentration Intact] : normal concentrating ability [Visual Intact] : visual attention was ~T not ~L decreased [Naming Objects] : no difficulty naming common objects [Repeating Phrases] : no difficulty repeating a phrase [Writing A Sentence] : no difficulty writing a sentence [Fluency] : fluency intact [Comprehension] : comprehension intact [Reading] : reading intact [Current Events] : adequate knowledge of current events [Past History] : adequate knowledge of personal past history [Vocabulary] : adequate range of vocabulary [Cranial Nerves Optic (II)] : visual acuity intact bilaterally,  visual fields full to confrontation, pupils equal round and reactive to light [Cranial Nerves Oculomotor (III)] : extraocular motion intact [Cranial Nerves Trigeminal (V)] : facial sensation intact symmetrically [Cranial Nerves Facial (VII)] : face symmetrical [Cranial Nerves Glossopharyngeal (IX)] : tongue and palate midline [Cranial Nerves Accessory (XI - Cranial And Spinal)] : head turning and shoulder shrug symmetric [Cranial Nerves Hypoglossal (XII)] : there was no tongue deviation with protrusion [Motor Tone] : muscle tone was normal in all four extremities [Motor Strength] : muscle strength was normal in all four extremities [No Muscle Atrophy] : normal bulk in all four extremities [Motor Handedness Right-Handed] : the patient is right hand dominant [Sensation Tactile Decrease] : light touch was intact [Sensation Pain / Temperature Decrease] : pain and temperature was intact [Abnormal Walk] : normal gait [Balance] : balance was intact [Past-pointing] : there was no past-pointing [Tremor] : no tremor present [3+] : Patella left 3+ [2+] : Ankle jerk left 2+ [Plantar Reflex Right Only] : normal on the right [Plantar Reflex Left Only] : normal on the left [___] : absent on the right [___] : absent on the left [FreeTextEntry5] : hearing intact bilat.   [FreeTextEntry6] : mild ABP weakness, and atrophy of thenar eminence [Sclera] : the sclera and conjunctiva were normal [PERRL With Normal Accommodation] : pupils were equal in size, round, reactive to light, with normal accommodation [Extraocular Movements] : extraocular movements were intact [Outer Ear] : the ears and nose were normal in appearance [Oropharynx] : the oropharynx was normal [Neck Appearance] : the appearance of the neck was normal [Neck Cervical Mass (___cm)] : no neck mass was observed [Jugular Venous Distention Increased] : there was no jugular-venous distention [Thyroid Diffuse Enlargement] : the thyroid was not enlarged [Thyroid Nodule] : there were no palpable thyroid nodules [Auscultation Breath Sounds / Voice Sounds] : lungs were clear to auscultation bilaterally [Heart Rate And Rhythm] : heart rate was normal and rhythm regular [Heart Sounds] : normal S1 and S2 [Heart Sounds Gallop] : no gallops [Murmurs] : no murmurs [Heart Sounds Pericardial Friction Rub] : no pericardial rub [Bowel Sounds] : normal bowel sounds [Nail Clubbing] : no clubbing  or cyanosis of the fingernails [] : no rash [Skin Lesions] : no skin lesions

## 2023-06-20 NOTE — DISCUSSION/SUMMARY
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    \par H/o syncope, s/p PPM.\par Multilevel disc disease.\par \par s/p 2019 surgery for cervical myelopathy/stenosis, max at C4-C5.  some hand numbness intermittently on left pre-op, improved.  Improved dysesthesia of the ear (numbness in the lower half of the ear/lobe, and behind mandible in neck in C2/3 distribution to pinprick), may have been of high superficial cervical plexus etiology (C2-C3 greater auricular n.). \par Unfortunately now, C5-6 left radiculopathy.\par L5-S1 likely causative of left buttock pain, sciatica, and foot dysesthesias.  Less of late\par \par Cont /d\par consider trial low dose pregabalin or elavil for neuropathic radicular pain\par f/u ortho for other options if pain intractable. [Well-controlled] : well-controlled [Secondary Generalization] : secondary generalization [Idiopathic] : idiopathic  [Focal] : focal [Safety Recommendations] : The patient was advised in regards to the risk of seizures and general seizure safety recommendations including not to be bathing alone, climbing to high places and operating heavy machinery. [Medication Side Effects] : High frequency and serious potential medication adverse effects were reviewed with the patient, including but not exclusive to psychiatric effects.  Information sheets on medication side effects were made available to the patient in our clinic.  The patient or advocate agrees to notify us for any concerns. [Sleep Hygiene/Sleep Disruption Risks] : Sleep hygiene and the risks of sleep disruption were discussed.

## 2023-06-20 NOTE — DATA REVIEWED
[de-identified] : 4/2015 CTs have been normal of head, and she cannot have MRI and did not have one prior to the pacemaker.  \par 2016 CT moderate canal stenosis, cord flattening.\par 2019 CT C4-5 central left disc with compression. C5-6, C6-7 root issues.  T11-12 right disc with root compression, L5-S1 disc and root compression. [de-identified] :  EEG showed left temporal spikes in past [de-identified] : Lamictal level was 6.3 in July 2013 on 300 mg per day but dose decreased to 250 for the day due to side effects.  Previous level was 4.1 on 200 mg per day.  LTG 5.2 2016

## 2023-06-29 ENCOUNTER — APPOINTMENT (OUTPATIENT)
Dept: OTOLARYNGOLOGY | Facility: CLINIC | Age: 63
End: 2023-06-29
Payer: COMMERCIAL

## 2023-06-29 VITALS
WEIGHT: 160 LBS | SYSTOLIC BLOOD PRESSURE: 127 MMHG | BODY MASS INDEX: 26.66 KG/M2 | TEMPERATURE: 98 F | HEIGHT: 65 IN | HEART RATE: 65 BPM | DIASTOLIC BLOOD PRESSURE: 76 MMHG

## 2023-06-29 DIAGNOSIS — R49.0 DYSPHONIA: ICD-10-CM

## 2023-06-29 DIAGNOSIS — K21.9 GASTRO-ESOPHAGEAL REFLUX DISEASE W/OUT ESOPHAGITIS: ICD-10-CM

## 2023-06-29 PROCEDURE — 99214 OFFICE O/P EST MOD 30 MIN: CPT | Mod: 25

## 2023-06-29 PROCEDURE — 31231 NASAL ENDOSCOPY DX: CPT

## 2023-06-29 RX ORDER — LEVOFLOXACIN 500 MG/1
500 TABLET, FILM COATED ORAL DAILY
Qty: 21 | Refills: 0 | Status: ACTIVE | COMMUNITY
Start: 2023-06-29 | End: 1900-01-01

## 2023-06-29 NOTE — CONSULT LETTER
[Please see my note below.] : Please see my note below. [FreeTextEntry1] : Dear Dr. NANCY HARVEY \par I had the pleasure of evaluating your patient DANA GROSS, thank you for allowing us to participate in their care. please see full note detailing our visit below.\par If you have any questions, please do not hesitate to call me and I would be happy to discuss further. \par \par Davis Madden M.D.\par Attending Physician,  \par Department of Otolaryngology - Head and Neck Surgery\[facility] \par Office: [phone]\par Fax: [phone]\par

## 2023-06-29 NOTE — HISTORY OF PRESENT ILLNESS
[Declined Rectal Exam] : declined rectal exam [Normal] : affect was normal and insight and judgment were intact [de-identified] : 62 year old female presents with persistent cough and post nasal drip for 2 months. She had bronchitis 2 months ago, states shes been getting recurrent bronchitis, 2-3 infections per year usually, took antibiotics but now has cough and post nasal drip that isnt going away. Saw a pulmonologist last week, but lungs were clear. Tried azelastine but didn’t like how she felt on it. Using flonase twice a day for last month. Taking mucinex for cough. \par Patient states she does have a history of sinus infections, 2-3 in last year. Has pressure at her forehead which goes away between infections. \par Took 20mg of prednisone when bronchitis started.  [FreeTextEntry1] : Patient following up with persistent cough and post nasal drip. States in the morning she is coughing constantly and has constant PND. No improvement with medrol dose pack - reduced cough when on it, omeprazole or tessalon pearls. Interested in doing a strep throat swab today. Patient states nose hurts since yesterday when she presses to blow it. Has had pressure at head for about a week. started advil cold and sinus which has calmed her symptoms a bit. Sleeps with honey lemon cough drop in mouth which helps her sleep, otherwise no relief. \par no stuffy nose, can blow out some mucous \par started sx in march with bronchitis \par

## 2023-06-29 NOTE — END OF VISIT
[FreeTextEntry3] : I personally saw and examined the patient in detail. I spoke to KARLA Dotson regarding the assessment and plan of care.  I preformed the procedures and I reviewed the above assessment and plan of care, and agree. I have made changes in changes in the body of the note where appropriate.

## 2023-06-29 NOTE — ASSESSMENT
[FreeTextEntry1] : 62 year old female presents for persistent cough and post nasal drip for 2 months. On exam, thin secretions in nose. Mild reflux visualized at larynx. \par Will proceed to start lifestyle regiment to reduce overproduction of acid and reduce laryngeal reflux including avoiding caffiene, alcohol, eating before bed, peppermint, spicy and fatty foods, and head elevation at night etc. Handout detailing regiment also given.\par - pepcid at bedtime\par - BID PPI\par - codeine cough suppressant \par \par Patient with thin secretions from nose, sinus pressure \par We will proceed with a regiment of decongestants, antibiotics and irrigation to try to break the cycle of inflation and obstruction. this will treat the acute symptoms and will hopefully allow for maintenance therapy to reach disease areas and avoid further intervention. \par - levaquin - should also cover lungs \par - continue atrovent for drip\par \par Discussed options with patient: \par 1) sinusitis Tx\par 2) codeine antitussive\par 3) neuromodulator as patient seems resistant to reflux therapy. Also discussed superior laryngeal nerve block. \par will start with first 2, seeing Gi as well

## 2023-06-29 NOTE — PROCEDURE
[FreeTextEntry6] : Procedure performed: Nasal Endoscopy- Diagnostic\par Pre-op indication(s): nasal congestion\par Post-op indication(s): nasal congestion\par Verbal and/or written consent obtained from patient\par Anterior rhinoscopy insufficient to account for symptoms\par Scope #: 3, flexible fiber optic telescope\par The scope was introduced in the nasal passage between the middle and inferior turbinates to exam the inferior portion of the middle meatus and the fontanelle, as well as the maxillary ostia. Next, the scope was passed medically and posteriorly to the middle turbinates to examine the sphenoethmoid recess and the superior turbinate region.\par \par Upon visualization the finders are as follows:\par Nasal Septum: sigmoidal septal deviation\par Bilateral - Mucosa: boggy turbinates, Mucous: scant, Polyp: not seen, Inferior Turbinate: boggy, Middle Turbinate: normal, Superior Turbinate: normal, Inferior Meatus: narrow, Middle Meatus: narrow, Super Meatus: normal, Sphenoethmoidal Recess: clear\par thin secretions [de-identified] : Procedure performed: laryngeal Endoscopy- Diagnostic\par Pre-op/post op indication: dysphonia\par Verbal and/or written consent obtained from patient, Patient was unable to cooperate with mirror\par Scope #: 3, flexible fiber optic telescope used\par \par Scope was introduced through the nose passed on the floor of the nose to the nasopharynx and then followed down the soft palate to the lower pharynx. The tongue Base, Larynx, Hypopharynx were examined. Base of tongue was symmetric, vallecular was clear, epiglottis was not deformed, subglottis/ pyriform and posterior pharyngeal walls were clear. No erythema, edema, pooling of secretions, masses or lesions. Airway patent, no foreign body visualized. Postcricoid area with moderate erythema and mild edema. + intra-artenoid bar. No pooling of secretions. True vocal cords, vestibular folds, ventricles, pyriform sinuses, and aryepiglottic folds appear normal bilaterally. Vocal cords mobile with good contact b/l.\par

## 2023-07-04 PROBLEM — K82.4 GALLBLADDER POLYP: Status: ACTIVE | Noted: 2018-02-13

## 2023-07-04 PROBLEM — Z95.0 HISTORY OF PACEMAKER: Status: ACTIVE | Noted: 2022-01-27

## 2023-07-06 ENCOUNTER — APPOINTMENT (OUTPATIENT)
Dept: GASTROENTEROLOGY | Facility: CLINIC | Age: 63
End: 2023-07-06
Payer: COMMERCIAL

## 2023-07-06 VITALS
HEART RATE: 66 BPM | DIASTOLIC BLOOD PRESSURE: 71 MMHG | WEIGHT: 159 LBS | SYSTOLIC BLOOD PRESSURE: 128 MMHG | BODY MASS INDEX: 26.49 KG/M2 | OXYGEN SATURATION: 97 % | HEIGHT: 65 IN

## 2023-07-06 DIAGNOSIS — I47.29 OTHER VENTRICULAR TACHYCARDIA: ICD-10-CM

## 2023-07-06 DIAGNOSIS — K82.4 CHOLESTEROLOSIS OF GALLBLADDER: ICD-10-CM

## 2023-07-06 DIAGNOSIS — D12.6 BENIGN NEOPLASM OF COLON, UNSPECIFIED: ICD-10-CM

## 2023-07-06 DIAGNOSIS — K29.40 CHRONIC ATROPHIC GASTRITIS W/OUT BLEEDING: ICD-10-CM

## 2023-07-06 DIAGNOSIS — Z95.0 PRESENCE OF CARDIAC PACEMAKER: ICD-10-CM

## 2023-07-06 DIAGNOSIS — G47.33 OBSTRUCTIVE SLEEP APNEA (ADULT) (PEDIATRIC): ICD-10-CM

## 2023-07-06 PROCEDURE — 99214 OFFICE O/P EST MOD 30 MIN: CPT

## 2023-07-06 NOTE — HISTORY OF PRESENT ILLNESS
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  \par \par March Bronchitis - AntibiOtic, steroids, coughing worsening - seen pulmonologist, ENT (was told GERD possibility, post nasal drip).  Now on Levaquin for 21 days, prednisone (3 more days).  Yesterday was first days has made turn but is worried for recurrence of symptom because it has happened before.  \par \par Started omeprazole and Pepcid at night - \par Says has no reflux symptoms - just coughing - coughing did no improve with omeprazole  \par A a week ago before starring Levaquin had painful swallowing.  \par Always constipation. \par \par Patient denies having heartburn, regurgitation, nausea, vomiting, loss of appetite, weight loss, melena and hematochezia.\par \par All other review of systems are negative.\par \par \par \par \par Initial visit on 2018: \par Pt is referred by her general surgeon for evaluation of RUQ pain. The patient reports since the past 6 weeks she's had a right upper quadrant discomfort. She says it feels like a tenderness that is constant throughout the day but varies in intensity with maximal discomfort rated as 4-5/10 on pain scale. She denies radiation of pain but recently she felt a discomfort in right neck that is new. She recently started taking Nexium which has not helped. She was seen by general surgeon for the pain who ordered an abdominal ultrasound and is currently awaiting a HIDA scan with [facility].\par \par There are no alleviating factors - at times the pain is felt more upon turning or moving. \par \par She is worried about pancreatic cancer. Her mother was diagnosed with pancreatic cancer at 64 and  4 months later. \par \par She has changed her diet, trying to lose weight for an upcoming wedding - since the past 2-3 weeks she's been eating salads.\par \par She says her stools have changed somewhat she tends to be constipated but lately she can have loose stools. She has seen mucus in her stools and her stools have been lighter in color.\par \par On 2018 the patient had blood work revealed a hemoglobin of 10.6 with hematocrit of 34.0. Her liver function profile is normal. Her lipase was normal.\par \par On recent abdominal ultrasound in , she was found to have a 2 mm gallbladder polyp unchanged from ultrasound in 2016. There were no stones seen within the gallbladder. Otherwise normal. \par \par Her last CAT scan of the abdomen and pelvis was performed in 2016 without IV contrast. On the noncontrast study her pancreas looked normal.\par \par In May of 2015, the patient had an upper endoscopy and was found to have a 2 cm hiatal hernia. There was an erosion seen in the gastric body that was biopsied. Duodenal biopsies revealed chronic duodenitis with focal surface gastric metaplasia suggestive of peptic duodenitis. Gastric biopsies revealed mild chronic gastritis negative for H. pylori bacteria and negative for intestinal metaplasia. Esophageal biopsies revealed mild reactive epithelial changes.\par \par On 2013 the patient had a colonoscopy for screening and was found to have moderate internal hemorrhoids. She was found to have few diverticulosis. She was recommended to have a repeat colonoscopy in 5 years.\par \par Other cancers that run in her family her her paternal grandmother had bone cancer. Her Brother  of sudden MI in his 60s. \par \par On 2018 underwent EUS found ot have multiple small polyps in the gastric fundus. Mild duodenitis in the bulb, D2 was normal. Pancreas was normal. There were a couple of small stones in the gallbladder. \par \par Gastric polyp was a foveolar hyperplastic polyp. The background gastric body/fundic mucosa with chronic gastritis negative for H. pylori. Lymphocytes, plasma cells, and eosinophils infiltrate into the entire thickness of the gastric fundic mucosa. These histologic features may be nonspecific changes secondary to the polyp but raise the concern of immune medicated gastritis. \par On 2018 she had a colonoscopy and found to have a 5 mm polyp in the cecum that was removed with cold snare polypectomy. Few sigmoid diverticulosis. Polyp was a tubular adenoma.\par \par May 2018 office visit: \par Patient reports feeling well. She says her RUQ pain has resolved. She offers no other complaints. \par \par Discussion/Summary May 2018 \par Results of serologies for autoimmune gastritis reviewed with patient over the phone. Patient was anemic in  with Hgb of 10.6. Discussed the possibility of CT enterography to evaluated Small bowel since patient not a candidate for capsule. \par \par Will recheck Blood test, and fecal occult blood test - if persistent anemia then CT enterography. \par  \par Discussion/Summary May 2018: \par Discussed recent blood test- In 2018 the patient had blood work revealed a hemoglobin of 10.6 with hematocrit of 34.0. Recent blood test showed H/H of 11.0/34.0, FOBT negative. will hold off on evaluation of small bowel - pt to follow up with PCP, may recheck CBC in 3 months - if persistently anemic without other explanation will consider CT enterography. \par \par \par Office visit 2021: \par Only one member in the family with pancreatic cancer - mom had pancreatic - mother was one of 9 sibling - one or two had cancer - but she is not sure what type of cancer. \par \par Concerned about her esophagus - she gets acid reflux -takes papaya and it helps.\par has burning in chest - since past 4 to 6 months its been getting worse. \par \par No difficulty swallowing, no painful swallowing. Feels an irritation in back of throat. No nausea, no vomiting. No loss of appetite, no weight gain. RUQ pain resolved since last visit - only when she presses her abdomen. No diarrhea. Constipation on and off. Stools are dark at times. No melena and no hematochezia. She is feeling tired - has gained weight. \par Sees cardiology once a year - gets pacemaker checked 4 times a year. \par Brother  of heart attack at age 60. \par \par \par Discussion/Summary 2021: \par Called patient and results of recent ultrasound reviewed \par Gallbladder polyp 4 mm - in  it was 2 mm.\par \par Continued surveillance of gallbladder polyp - repeat exam in one year - she verbalized understanding. \par \par \par Discussion/Summary 2021: \par Maternal cousin and mom have had pancreatic cancer - EUS findings reviewed from 2021 - normal pancreas - recommended yearly observation of pancreas - she can't have MRI because of defibrillator - she will arrange for EUS in 2022. She will arrange for surveillance colonoscopy in 2022. \par \par Office visit 2022: \par Patient denies having abdominal pain, constipation, diarrhea, loss of appetite, weight loss, melena and hematochezia.\par \par \par \par Office visit 2022: \par Feels well. Maternal grandparent had throat or esophageal cancer - she is currently undergoing genetic testing. Has followed up with Dr. Carter Mabry given family history of pancreatic cancer and will continue to follow up with pancreatic clinic.

## 2023-07-06 NOTE — ASSESSMENT
[FreeTextEntry1] : IMPRESSION:\par #  Chronic cough since March 2023 when diagnosed bronchitis - ongoing cough- concern for reflux\par  - Seen pulmonologist, ENT (was told GERD possibility, post nasal drip) -now on Levaquin for 21 days, prednisone (3 more days). \par -  coughing did not improve with omeprazole twice, along Pepcid at night since past 6 to 7 weeks.  \par -  Odynophagia \par \par #  High risk adenoma \par - Colonoscopy on 12/2022: Cecal polypectomy site normal and biopsied (neg path). Diminutive ascending colon polyp removed by cold snare (no tissue identified). Large flat polyp more than 10 mm with similar color as rest of colon with indistinct borders - polyp lifted with ORISE for better view and then removed by cold snare (sessile serrated lesion). Diminutive rectal polyp removed with cold snare (tubular adenoma). Scattered right colon diverticulosis. Mild to moderate sigmoid diverticulosis. Repeat in one year. \par - Colonoscopy on 11/2021: 1.2 cm flat polyp in the cecum removed by exacto snare in a piecemeal fashion (sessile serrated adenoma). Diminutive sigmoid colon polyp removed by exacto snare (prominent lymphoid aggregates). Few scattered diverticula with an inverted diverticulum in transverse colon. Small internal hemorrhoids. Good prep. Repeat in 1 to 2 years. \par  - Colonoscopy in April 2018 - 5 mm polyp in the cecum removed (tubular adenoma) - few sigmoid diverticulosis - repeat colonoscopy 3 years\par - Colonoscopy in 2013 few diverticulosis - repeat exam in 5 years. \par \par #  Family Hx of pancreatic cancer - Follows with pancreatic center \par - Mother at 64 and maternal cousin at age late 40s \par - CT scan of the abd with IV contrast on 12/2022: Right hepatic cyst at 1.4 cm. Normal pancreas. Mild atherosclerotic calcifications. \par - EGD/EUS on 6/2021 by Dr. Hogan: Scattered small fundic gland polyps. Gastric bx showed chronic gastritis negative for HP, IM and autoimmune gastritis. Normal pancreas. \par - EGD/EUS in 2018: Gastric polyps in fundus with bx showing foveolar hyperplastic polyp - histology features of gastric polyp may be nonspecific changes secondary to the polyp but raise the concern of autoimmune gastritis as per pathologist - Antiparietal cell antibody and intrinsic factor blocking antibody were negative in 2018\par - CT scan of the abd/pelvis without IV contrast on 2016 showed normal pancreas\par \par #  Gallbladder polyp - small \par - Abdominal ultrasound on 9/2022: 3 mm GB polyp. \par - Abdominal ultrasound on 5/2021: 4 mm gallbladder polyp.\par - Abdominal ultrasound in 2/2018 showed a 2 mm gallbladder polyp unchanged since 2016\par \par #  Comorbidities: Obstructive sleep apnea, seizures (at age 47 and 49), and history of syncope at age 44 with documented sinus arrest leading to permanent pacemaker implantation (Guidant insignia dual-chamber). \par \par \par \par \par DISCUSSION/PLAN: \par Discussed Ph studies to r/o acid reflux - reviewed EGD with Roach, ambulatory PH study, EGD with biopsies - risks/benefits/alternatives all reviewed - she would like routine upper endoscopy with biopsies off of PPI therapy.  \par \par She is also due for a colonoscopy since flat lesion in past to r/o other flat colon polyps etc. under monitored anesthesia care.  Risks such as perforation requiring surgery, colostomy, bleeding requiring blood transfusion, infection/sepsis, diverticulitis, colitis, missed colon cancer (2% to 6%), internal organ injury such as splenic hemorrhage (1/6000, risk thin, female gender) etc, adverse reaction to medication etc. and risks of anesthesia including cardiopulmonary compromise requiring ICU care were discussed with patient.  Alternatives were discussed.  Patient verbalized understanding and agrees to proceed with a colonoscopy under anesthesia.\par  \par Need cardiology clearance - pacemaker interrogation -\par \par She will see me in office after her colonoscopy to discuss best surveillance time line. Previously discussed possible future clinical diagnosis of Serrated Polyposis Syndrome - to meet criteria she would need a total of 5 or more Serrated lesions proximal to rectum with two being large.

## 2023-07-18 ENCOUNTER — APPOINTMENT (OUTPATIENT)
Dept: NEUROLOGY | Facility: CLINIC | Age: 63
End: 2023-07-18

## 2023-07-24 ENCOUNTER — APPOINTMENT (OUTPATIENT)
Dept: OTOLARYNGOLOGY | Facility: CLINIC | Age: 63
End: 2023-07-24
Payer: COMMERCIAL

## 2023-07-24 VITALS
HEIGHT: 65 IN | TEMPERATURE: 97.8 F | SYSTOLIC BLOOD PRESSURE: 122 MMHG | DIASTOLIC BLOOD PRESSURE: 78 MMHG | WEIGHT: 159 LBS | BODY MASS INDEX: 26.49 KG/M2 | HEART RATE: 65 BPM

## 2023-07-24 PROCEDURE — 31231 NASAL ENDOSCOPY DX: CPT

## 2023-07-24 PROCEDURE — 99213 OFFICE O/P EST LOW 20 MIN: CPT | Mod: 25

## 2023-07-24 NOTE — ASSESSMENT
[FreeTextEntry1] : 62 year old female presents for persistent cough and post nasal drip . On exam, thin secretions in nose. Mild reflux visualized at larynx. Treated with abx regiment feeling much better from cough, can continue regime if needed\par occ congestion and pressure - can continue nasal sprays and see if helps \par Will proceed to start lifestyle regiment to reduce overproduction of acid and reduce laryngeal reflux including avoiding caffiene, alcohol, eating before bed, peppermint, spicy and fatty foods, and head elevation at night etc. Handout detailing regiment also given.\par - follow up if needed\par \par \par

## 2023-07-24 NOTE — CONSULT LETTER
[Please see my note below.] : Please see my note below. [FreeTextEntry1] : Dear Dr. NANCY HARVEY \par I had the pleasure of evaluating your patient DANA GROSS, thank you for allowing us to participate in their care. please see full note detailing our visit below.\par If you have any questions, please do not hesitate to call me and I would be happy to discuss further. \par \par Davis Madden M.D.\par Attending Physician,  \par Department of Otolaryngology - Head and Neck Surgery\par [facility] \par Office: [phone]\par Fax: [phone]\par

## 2023-07-24 NOTE — PROCEDURE
[FreeTextEntry6] : Procedure performed: Nasal Endoscopy- Diagnostic\par Pre-op indication(s): nasal congestion\par Post-op indication(s): nasal congestion\par Verbal and/or written consent obtained from patient\par Anterior rhinoscopy insufficient to account for symptoms\par Scope #: 3, flexible fiber optic telescope\par The scope was introduced in the nasal passage between the middle and inferior turbinates to exam the inferior portion of the middle meatus and the fontanelle, as well as the maxillary ostia. Next, the scope was passed medically and posteriorly to the middle turbinates to examine the sphenoethmoid recess and the superior turbinate region.\par \par Upon visualization the finders are as follows:\par Nasal Septum: sigmoidal septal deviation\par Bilateral - Mucosa: boggy turbinates, Mucous: scant, Polyp: not seen, Inferior Turbinate: boggy, Middle Turbinate: normal, Superior Turbinate: normal, Inferior Meatus: narrow, Middle Meatus: narrow, Super Meatus: normal, Sphenoethmoidal Recess: clear\par  [de-identified] : Procedure performed: laryngeal Endoscopy- Diagnostic\par Pre-op/post op indication: dysphonia\par Verbal and/or written consent obtained from patient, Patient was unable to cooperate with mirror\par Scope #: 3, flexible fiber optic telescope used\par \par Scope was introduced through the nose passed on the floor of the nose to the nasopharynx and then followed down the soft palate to the lower pharynx. The tongue Base, Larynx, Hypopharynx were examined. Base of tongue was symmetric, vallecular was clear, epiglottis was not deformed, subglottis/ pyriform and posterior pharyngeal walls were clear. No erythema, edema, pooling of secretions, masses or lesions. Airway patent, no foreign body visualized. Postcricoid area with moderate erythema and mild edema. + intra-artenoid bar. No pooling of secretions. True vocal cords, vestibular folds, ventricles, pyriform sinuses, and aryepiglottic folds appear normal bilaterally. Vocal cords mobile with good contact b/l.\par

## 2023-07-24 NOTE — HISTORY OF PRESENT ILLNESS
[de-identified] : 62 year old female presents with persistent cough and post nasal drip for 2 months. She had bronchitis 2 months ago, states shes been getting recurrent bronchitis, 2-3 infections per year usually, took antibiotics but now has cough and post nasal drip that isnt going away. Saw a pulmonologist last week, but lungs were clear. Tried azelastine but didn’t like how she felt on it. Using flonase twice a day for last month. Taking mucinex for cough. \par Patient states she does have a history of sinus infections, 2-3 in last year. Has pressure at her forehead which goes away between infections. \par Took 20mg of prednisone when bronchitis started. \par \par Patient following up with persistent cough and post nasal drip. States in the morning she is coughing constantly and has constant PND. No improvement with medrol dose pack - reduced cough when on it, omeprazole or tessalon pearls. Interested in doing a strep throat swab today. Patient states nose hurts since yesterday when she presses to blow it. Has had pressure at head for about a week. started advil cold and sinus which has calmed her symptoms a bit. Sleeps with honey lemon cough drop in mouth which helps her sleep, otherwise no relief. \par no stuffy nose, can blow out some mucous \par started sx in march with bronchitis  [FreeTextEntry1] : Patient following up with persistent cough and post nasal drip. States after antibiotics, doing much better. Cough resolved. States has a tickle intermittently but otherwise doing much better. Has had stuffiness for last few days. Still has pressure at head intermittently. Stopped taking omeprazole, saw GI and going for endoscopy and colonoscopy at end of month.  5

## 2023-07-28 ENCOUNTER — NON-APPOINTMENT (OUTPATIENT)
Age: 63
End: 2023-07-28

## 2023-07-28 NOTE — HISTORY OF PRESENT ILLNESS
[Scheduled Procedure ___] : a [unfilled] [Stable] : Stable [GI Disease] : gastrointestinal disease [Steroid Use in Last 6 Months] : steroid use in the last six months [Prior Anesthesia] : Prior anesthesia [Electrocardiogram] : ~T an ECG ~C was performed [Echocardiogram] : ~T an echocardiogram ~C was performed [FreeTextEntry1] : Ms. Garcia is a 62 year old female that carries a history of cervical radiculopathy, convulsive seizures in 2008, 2010 (now under control) siick sinus syndrome requiring a pacemaker with prior history of atrial fibrillation.\par \par Of note, patient has had several upper respiratory infections over the course of  this last year, requiring antibiotics and steroids. \par \par Patient currently has a [employer] L331 Accolade MRI EL pacemaker which was implanted on April 6, 2017.\par Original pacemaker was implanted in November of 2004. \par \par She is NOT pacemaker dependent. The most recent PPM interrogation was conducted on June 6, 2023.\par There was normal function of the device with adequate pacing and sending thresholds. \par Stored data revealed no arrhythmic events for review. \par \par Vital signs have been stable.\par Normotensive blood pressure.

## 2023-07-28 NOTE — DISCUSSION/SUMMARY
[Procedure Low Risk] : the procedure risk is low [Patient Low Risk] : the patient is a low surgical risk [Optimized for Surgery] : the patient is optimized for surgery [Continue] : Continue medications as currently directed [FreeTextEntry1] : Ms. Garcia is a 62 year old female that carries a history of cervical radiculopathy, convulsive seizures in 2008, 2010 (now under control) siick sinus syndrome requiring a pacemaker with prior history of atrial fibrillation.\par \par Of note, patient has had several upper respiratory infections over the course of  this last year, requiring antibiotics and steroids. \par \par Patient currently has a [employer] L331 Accolade MRI EL pacemaker which was implanted on April 6, 2017.\par Original pacemaker was implanted in November of 2004. \par \par She is NOT pacemaker dependent. No special accommodations will be necssary.  The most recent PPM interrogation was conducted on June 6, 2023.\par There was normal function of the device with adequate pacing and sending thresholds. \par Stored data revealed no arrhythmic events for review. \par \par Vital signs have been stable.\par Normotensive blood pressure. \par \par There is no cardiovascular contraindications in proceeding with a colonoscopy procedure.\par

## 2023-08-24 ENCOUNTER — TRANSCRIPTION ENCOUNTER (OUTPATIENT)
Age: 63
End: 2023-08-24

## 2023-08-25 ENCOUNTER — APPOINTMENT (OUTPATIENT)
Dept: GASTROENTEROLOGY | Facility: HOSPITAL | Age: 63
End: 2023-08-25

## 2023-08-25 ENCOUNTER — OUTPATIENT (OUTPATIENT)
Dept: OUTPATIENT SERVICES | Facility: HOSPITAL | Age: 63
LOS: 1 days | End: 2023-08-25
Payer: COMMERCIAL

## 2023-08-25 ENCOUNTER — RESULT REVIEW (OUTPATIENT)
Age: 63
End: 2023-08-25

## 2023-08-25 DIAGNOSIS — Z98.890 OTHER SPECIFIED POSTPROCEDURAL STATES: Chronic | ICD-10-CM

## 2023-08-25 DIAGNOSIS — Z98.1 ARTHRODESIS STATUS: Chronic | ICD-10-CM

## 2023-08-25 DIAGNOSIS — K21.9 GASTRO-ESOPHAGEAL REFLUX DISEASE WITHOUT ESOPHAGITIS: ICD-10-CM

## 2023-08-25 DIAGNOSIS — Z12.11 ENCOUNTER FOR SCREENING FOR MALIGNANT NEOPLASM OF COLON: ICD-10-CM

## 2023-08-25 PROCEDURE — 88312 SPECIAL STAINS GROUP 1: CPT | Mod: 26

## 2023-08-25 PROCEDURE — 43239 EGD BIOPSY SINGLE/MULTIPLE: CPT

## 2023-08-25 PROCEDURE — 88305 TISSUE EXAM BY PATHOLOGIST: CPT | Mod: 26

## 2023-08-25 PROCEDURE — 88305 TISSUE EXAM BY PATHOLOGIST: CPT

## 2023-08-25 PROCEDURE — 45378 DIAGNOSTIC COLONOSCOPY: CPT

## 2023-08-25 PROCEDURE — 88313 SPECIAL STAINS GROUP 2: CPT | Mod: 26

## 2023-08-25 PROCEDURE — 88313 SPECIAL STAINS GROUP 2: CPT

## 2023-08-25 PROCEDURE — 88312 SPECIAL STAINS GROUP 1: CPT

## 2023-08-28 ENCOUNTER — NON-APPOINTMENT (OUTPATIENT)
Age: 63
End: 2023-08-28

## 2023-08-28 LAB — SURGICAL PATHOLOGY STUDY: SIGNIFICANT CHANGE UP

## 2023-09-07 ENCOUNTER — APPOINTMENT (OUTPATIENT)
Dept: GASTROENTEROLOGY | Facility: CLINIC | Age: 63
End: 2023-09-07
Payer: COMMERCIAL

## 2023-09-07 VITALS
BODY MASS INDEX: 26.99 KG/M2 | DIASTOLIC BLOOD PRESSURE: 69 MMHG | OXYGEN SATURATION: 98 % | SYSTOLIC BLOOD PRESSURE: 109 MMHG | WEIGHT: 162 LBS | HEART RATE: 64 BPM | HEIGHT: 65 IN

## 2023-09-07 DIAGNOSIS — R05.3 CHRONIC COUGH: ICD-10-CM

## 2023-09-07 PROCEDURE — 99213 OFFICE O/P EST LOW 20 MIN: CPT

## 2023-09-07 NOTE — ASSESSMENT
[FreeTextEntry1] : IMPRESSION: # Chronic cough since 3/2023  - did not improve with PPI BID, along H2 blocker  -  EGD by Dr. Black on 8/2023:  Nml esophagus.  Nml GEJ s/p bx (neg for BE).  Small hiatal Hill grade II flap valve.  Mild antral erythema s/p bx (neg for HP and IM).  Nml duodenum.  Sigmoid diverticulosis.    # History of high risk adenoma -  Colonoscopy (pedscope) by Dr. Black: No colon polyps. Sigmoid diverticulosis.   3 to 5 years.  -  Colonoscopy on 12/2022: Cecal polypectomy site normal and biopsied (neg path). Diminutive ascending colon polyp removed by cold snare (no tissue identified). Large flat polyp more than 10 mm with similar color as rest of colon with indistinct borders - polyp lifted with ORISE for better view and then removed by cold snare (sessile serrated lesion). Diminutive rectal polyp removed with cold snare (tubular adenoma). Scattered right colon diverticulosis. Mild to moderate sigmoid diverticulosis. - Colonoscopy on 11/2021: 1.2 cm flat polyp in the cecum removed by exacto snare in a piecemeal fashion (sessile serrated adenoma). Diminutive sigmoid colon polyp removed by exacto snare (prominent lymphoid aggregates). Few scattered diverticula with an inverted diverticulum in transverse colon. Small internal hemorrhoids.   - Colonoscopy in April 2018 - 5 mm polyp in the cecum removed (tubular adenoma) - few sigmoid diverticulosis -  - Colonoscopy in 2013 few diverticulosis - repeat exam in 5 years.  # Gallbladder polyp - small - Abdominal ultrasound on 9/2022: 3 mm GB polyp. - Abdominal ultrasound on 5/2021: 4 mm gallbladder polyp. - Abdominal ultrasound in 2/2018 showed a 2 mm gallbladder polyp unchanged since 2016   # Family Hx of pancreatic cancer - Follows with pancreatic center - Mother at 64 and maternal cousin at age late 40s - CT scan of the abd with IV contrast on 12/2022: Right hepatic cyst at 1.4 cm. Normal pancreas. Mild atherosclerotic calcifications. - EGD/EUS on 6/2021 by Dr. Hogan: Scattered small fundic gland polyps. Gastric bx showed chronic gastritis negative for HP, IM and autoimmune gastritis. Normal pancreas. - EGD/EUS in 2018: Gastric polyps in fundus with bx showing foveolar hyperplastic polyp - histology features of gastric polyp may be nonspecific changes secondary to the polyp but raise the concern of autoimmune gastritis as per pathologist - Antiparietal cell antibody and intrinsic factor blocking antibody were negative in 2018 - CT scan of the abd/pelvis without IV contrast on 2016 showed normal pancreas  # Comorbidities: Obstructive sleep apnea, seizures (at age 47 and 49), and history of syncope at age 44 with documented sinus arrest leading to permanent pacemaker implantation (Guidant insignia dual-chamber).     DISCUSSION/PLAN: No endoscopic evidence of GERD - we discussed wireless pH monitoring - 96 hour testing to evaluate for pathologic reflux.   Repeat colonoscopy for surveillance since history of polyps in 3 to 5 years.

## 2023-09-07 NOTE — HISTORY OF PRESENT ILLNESS
[FreeTextEntry1] : 61 y/o woman with Hx of Obstructive sleep apnea, history of seizures (at age 47 and 49), history of cervical spinal stenosis, and history of syncope at age 44 with documented sinus arrest leading to permanent pacemaker implantation (Guidant insignia dual-chamber) who presents for follow up after recent EGD/colonoscopy.   Coughing resumed - clearing throat - postnasal drip as well. Offers no complaints.    Initial visit on 2018: Pt is referred by her general surgeon for evaluation of RUQ pain. The patient reports since the past 6 weeks she's had a right upper quadrant discomfort. She says it feels like a tenderness that is constant throughout the day but varies in intensity with maximal discomfort rated as 4-5/10 on pain scale. She denies radiation of pain but recently she felt a discomfort in right neck that is new. She recently started taking Nexium which has not helped. She was seen by general surgeon for the pain who ordered an abdominal ultrasound and is currently awaiting a HIDA scan with CCK.  There are no alleviating factors - at times the pain is felt more upon turning or moving.  She is worried about pancreatic cancer. Her mother was diagnosed with pancreatic cancer at 64 and  4 months later.  She has changed her diet, trying to lose weight for an upcoming wedding - since the past 2-3 weeks she's been eating salads.   She says her stools have changed somewhat she tends to be constipated but lately she can have loose stools. She has seen mucus in her stools and her stools have been lighter in color.  On 2018 the patient had blood work revealed a hemoglobin of 10.6 with hematocrit of 34.0. Her liver function profile is normal. Her lipase was normal.   On recent abdominal ultrasound in 2018, she was found to have a 2 mm gallbladder polyp unchanged from ultrasound in 2016. There were no stones seen within the gallbladder. Otherwise normal.  Her last CAT scan of the abdomen and pelvis was performed in 2016 without IV contrast. On the noncontrast study her pancreas looked normal.  In May of 2015, the patient had an upper endoscopy and was found to have a 2 cm hiatal hernia. There was an erosion seen in the gastric body that was biopsied. Duodenal biopsies revealed chronic duodenitis with focal surface gastric metaplasia suggestive of peptic duodenitis. Gastric biopsies revealed mild chronic gastritis negative for H. pylori bacteria and negative for intestinal metaplasia. Esophageal biopsies revealed mild reactive epithelial changes.   On 2013 the patient had a colonoscopy for screening and was found to have moderate internal hemorrhoids. She was found to have few diverticulosis. She was recommended to have a repeat colonoscopy in 5 years.   Other cancers that run in her family her her paternal grandmother had bone cancer. Her Brother  of sudden MI in his 60s.   On 2018 underwent EUS found ot have multiple small polyps in the gastric fundus. Mild duodenitis in the bulb, D2 was normal. Pancreas was normal. There were a couple of small stones in the gallbladder.  Gastric polyp was a foveolar hyperplastic polyp. The background gastric body/fundic mucosa with chronic gastritis negative for H. pylori. Lymphocytes, plasma cells, and eosinophils infiltrate into the entire thickness of the gastric fundic mucosa. These histologic features may be nonspecific changes secondary to the polyp but raise the concern of immune medicated gastritis.  On 2018 she had a colonoscopy and found to have a 5 mm polyp in the cecum that was removed with cold snare polypectomy. Few sigmoid diverticulosis. Polyp was a tubular adenoma.   May 2018 office visit: Patient reports feeling well. She says her RUQ pain has resolved. She offers no other complaints.  Discussion/Summary May 2018 Results of serologies for autoimmune gastritis reviewed with patient over the phone. Patient was anemic in  with Hgb of 10.6. Discussed the possibility of CT enterography to evaluated Small bowel since patient not a candidate for capsule. Will recheck Blood test, and fecal occult blood test - if persistent anemia then CT enterography.   Discussion/Summary May 2018: Discussed recent blood test- In 2018 the patient had blood work revealed a hemoglobin of 10.6 with hematocrit of 34.0. Recent blood test showed H/H of 11.0/34.0, FOBT negative. will hold off on evaluation of small bowel - pt to follow up with PCP, may recheck CBC in 3 months - if persistently anemic without other explanation will consider CT enterography.   Office visit 2021: Only one member in the family with pancreatic cancer - mom had pancreatic - mother was one of 9 sibling - one or two had cancer - but she is not sure what type of cancer.  Concerned about her esophagus - she gets acid reflux -takes papaya and it helps. has burning in chest - since past 4 to 6 months its been getting worse.  No difficulty swallowing, no painful swallowing. Feels an irritation in back of throat. No nausea, no vomiting. No loss of appetite, no weight gain. RUQ pain resolved since last visit - only when she presses her abdomen. No diarrhea. Constipation on and off. Stools are dark at times. No melena and no hematochezia. She is feeling tired - has gained weight.  Sees cardiology once a year - gets pacemaker checked 4 times a year. Brother  of heart attack at age 60.   Discussion/Summary 2021: Called patient and results of recent ultrasound reviewed Gallbladder polyp 4 mm - in 2018 it was 2 mm. Continued surveillance of gallbladder polyp - repeat exam in one year - she verbalized understanding.   Discussion/Summary 2021: Maternal cousin and mom have had pancreatic cancer - EUS findings reviewed from 2021 - normal pancreas - recommended yearly observation of pancreas - she can't have MRI because of defibrillator - she will arrange for EUS in 2022. She will arrange for surveillance colonoscopy in 2022.   Office visit 2022: Patient denies having abdominal pain, constipation, diarrhea, loss of appetite, weight loss, melena and hematochezia.     Office visit 2022: Feels well. Maternal grandparent had throat or esophageal cancer - she is currently undergoing genetic testing. Has followed up with Dr. Carter Mabry given family history of pancreatic cancer and will continue to follow up with pancreatic clinic.  Office visit 2023: March Bronchitis - AntibiOtic, steroids, coughing worsening - seen pulmonologist, ENT (was told GERD possibility, post nasal drip). Now on Levaquin for 21 days, prednisone (3 more days). Yesterday was first days has made turn but is worried for recurrence of symptom because it has happened before.  Started omeprazole and Pepcid at night - Says has no reflux symptoms - just coughing - coughing did no improve with omeprazole A a week ago before starring Levaquin had painful swallowing. Always constipation.  Patient denies having heartburn, regurgitation, nausea, vomiting, loss of appetite, weight loss, melena and hematochezia.

## 2023-09-21 ENCOUNTER — NON-APPOINTMENT (OUTPATIENT)
Age: 63
End: 2023-09-21

## 2023-09-22 ENCOUNTER — APPOINTMENT (OUTPATIENT)
Dept: NEUROSURGERY | Facility: CLINIC | Age: 63
End: 2023-09-22
Payer: COMMERCIAL

## 2023-09-22 ENCOUNTER — RESULT REVIEW (OUTPATIENT)
Age: 63
End: 2023-09-22

## 2023-09-22 ENCOUNTER — NON-APPOINTMENT (OUTPATIENT)
Age: 63
End: 2023-09-22

## 2023-09-22 VITALS
BODY MASS INDEX: 26.99 KG/M2 | HEART RATE: 65 BPM | SYSTOLIC BLOOD PRESSURE: 153 MMHG | HEIGHT: 65 IN | OXYGEN SATURATION: 99 % | WEIGHT: 162 LBS | DIASTOLIC BLOOD PRESSURE: 80 MMHG

## 2023-09-22 PROCEDURE — 99203 OFFICE O/P NEW LOW 30 MIN: CPT

## 2023-09-22 RX ORDER — MOMETASONE FUROATE 1 MG/G
0.1 OINTMENT TOPICAL
Qty: 1 | Refills: 1 | Status: DISCONTINUED | COMMUNITY
Start: 2023-03-15 | End: 2023-09-22

## 2023-09-22 RX ORDER — IPRATROPIUM BROMIDE 21 UG/1
0.03 SPRAY NASAL 3 TIMES DAILY
Qty: 1 | Refills: 2 | Status: DISCONTINUED | COMMUNITY
Start: 2023-05-25 | End: 2023-09-22

## 2023-09-22 RX ORDER — PREDNISONE 10 MG/1
10 TABLET ORAL
Qty: 27 | Refills: 0 | Status: DISCONTINUED | COMMUNITY
Start: 2023-06-29 | End: 2023-09-22

## 2023-09-22 RX ORDER — BENZONATATE 100 MG/1
100 CAPSULE ORAL
Qty: 15 | Refills: 0 | Status: DISCONTINUED | COMMUNITY
Start: 2023-05-25 | End: 2023-09-22

## 2023-09-22 RX ORDER — SODIUM SULFATE, POTASSIUM SULFATE AND MAGNESIUM SULFATE 1.6; 3.13; 17.5 G/177ML; G/177ML; G/177ML
17.5-3.13-1.6 SOLUTION ORAL
Qty: 1 | Refills: 0 | Status: DISCONTINUED | COMMUNITY
Start: 2023-07-06 | End: 2023-09-22

## 2023-09-22 RX ORDER — FAMOTIDINE/CA CARB/MAG HYDROX 10-800-165
10-800-165 TABLET,CHEWABLE ORAL
Qty: 30 | Refills: 1 | Status: DISCONTINUED | COMMUNITY
Start: 2023-05-25 | End: 2023-09-22

## 2023-09-22 RX ORDER — GUAIFENESIN AND CODEINE PHOSPHATE 10; 100 MG/5ML; MG/5ML
100-10 SOLUTION ORAL
Qty: 1 | Refills: 0 | Status: DISCONTINUED | COMMUNITY
Start: 2023-06-29 | End: 2023-09-22

## 2023-09-28 ENCOUNTER — NON-APPOINTMENT (OUTPATIENT)
Age: 63
End: 2023-09-28

## 2023-10-01 PROBLEM — K29.40 AUTOIMMUNE GASTRITIS: Status: ACTIVE | Noted: 2018-05-07

## 2023-10-05 ENCOUNTER — APPOINTMENT (OUTPATIENT)
Dept: RADIOLOGY | Facility: CLINIC | Age: 63
End: 2023-10-05
Payer: COMMERCIAL

## 2023-10-05 PROCEDURE — 72052 X-RAY EXAM NECK SPINE 6/>VWS: CPT

## 2023-10-05 PROCEDURE — 72110 X-RAY EXAM L-2 SPINE 4/>VWS: CPT

## 2023-10-06 ENCOUNTER — APPOINTMENT (OUTPATIENT)
Dept: ORTHOPEDIC SURGERY | Facility: CLINIC | Age: 63
End: 2023-10-06
Payer: COMMERCIAL

## 2023-10-06 ENCOUNTER — NON-APPOINTMENT (OUTPATIENT)
Age: 63
End: 2023-10-06

## 2023-10-06 VITALS
DIASTOLIC BLOOD PRESSURE: 68 MMHG | WEIGHT: 160 LBS | SYSTOLIC BLOOD PRESSURE: 128 MMHG | HEIGHT: 65.5 IN | HEART RATE: 67 BPM | BODY MASS INDEX: 26.34 KG/M2

## 2023-10-06 DIAGNOSIS — M16.11 UNILATERAL PRIMARY OSTEOARTHRITIS, RIGHT HIP: ICD-10-CM

## 2023-10-06 PROCEDURE — 72170 X-RAY EXAM OF PELVIS: CPT

## 2023-10-06 PROCEDURE — 99214 OFFICE O/P EST MOD 30 MIN: CPT

## 2023-10-09 ENCOUNTER — APPOINTMENT (OUTPATIENT)
Dept: CARDIOLOGY | Facility: CLINIC | Age: 63
End: 2023-10-09
Payer: COMMERCIAL

## 2023-10-09 ENCOUNTER — NON-APPOINTMENT (OUTPATIENT)
Age: 63
End: 2023-10-09

## 2023-10-09 VITALS
OXYGEN SATURATION: 100 % | HEART RATE: 65 BPM | WEIGHT: 160 LBS | DIASTOLIC BLOOD PRESSURE: 79 MMHG | SYSTOLIC BLOOD PRESSURE: 132 MMHG | HEIGHT: 65.5 IN | BODY MASS INDEX: 26.34 KG/M2

## 2023-10-09 DIAGNOSIS — I45.5 OTHER SPECIFIED HEART BLOCK: ICD-10-CM

## 2023-10-09 PROCEDURE — 99214 OFFICE O/P EST MOD 30 MIN: CPT

## 2023-10-09 PROCEDURE — 93000 ELECTROCARDIOGRAM COMPLETE: CPT

## 2023-10-10 ENCOUNTER — LABORATORY RESULT (OUTPATIENT)
Age: 63
End: 2023-10-10

## 2023-10-16 ENCOUNTER — APPOINTMENT (OUTPATIENT)
Dept: INTERNAL MEDICINE | Facility: CLINIC | Age: 63
End: 2023-10-16
Payer: COMMERCIAL

## 2023-10-16 VITALS
RESPIRATION RATE: 18 BRPM | SYSTOLIC BLOOD PRESSURE: 130 MMHG | HEIGHT: 65 IN | OXYGEN SATURATION: 99 % | TEMPERATURE: 98.4 F | BODY MASS INDEX: 26.99 KG/M2 | HEART RATE: 70 BPM | WEIGHT: 162 LBS | DIASTOLIC BLOOD PRESSURE: 70 MMHG

## 2023-10-16 DIAGNOSIS — E55.9 VITAMIN D DEFICIENCY, UNSPECIFIED: ICD-10-CM

## 2023-10-16 DIAGNOSIS — Z13.820 ENCOUNTER FOR SCREENING FOR OSTEOPOROSIS: ICD-10-CM

## 2023-10-16 DIAGNOSIS — Z00.00 ENCOUNTER FOR GENERAL ADULT MEDICAL EXAMINATION W/OUT ABNORMAL FINDINGS: ICD-10-CM

## 2023-10-16 DIAGNOSIS — R92.30 INCONCLUSIVE MAMMOGRAM: ICD-10-CM

## 2023-10-16 DIAGNOSIS — R92.2 INCONCLUSIVE MAMMOGRAM: ICD-10-CM

## 2023-10-16 DIAGNOSIS — Z12.31 ENCOUNTER FOR SCREENING MAMMOGRAM FOR MALIGNANT NEOPLASM OF BREAST: ICD-10-CM

## 2023-10-16 PROCEDURE — 36415 COLL VENOUS BLD VENIPUNCTURE: CPT

## 2023-10-16 PROCEDURE — 99396 PREV VISIT EST AGE 40-64: CPT | Mod: 25

## 2023-10-18 ENCOUNTER — TRANSCRIPTION ENCOUNTER (OUTPATIENT)
Age: 63
End: 2023-10-18

## 2023-10-19 LAB
25(OH)D3 SERPL-MCNC: 36.4 NG/ML
APPEARANCE: CLEAR
BACTERIA: NEGATIVE /HPF
BILIRUBIN URINE: NEGATIVE
BLOOD URINE: NEGATIVE
CAST: 0 /LPF
COLOR: YELLOW
EPITHELIAL CELLS: 0 /HPF
GLUCOSE QUALITATIVE U: NEGATIVE MG/DL
KETONES URINE: NEGATIVE MG/DL
LEUKOCYTE ESTERASE URINE: NEGATIVE
MICROSCOPIC-UA: NORMAL
NITRITE URINE: NEGATIVE
PH URINE: 7
PROTEIN URINE: NEGATIVE MG/DL
RED BLOOD CELLS URINE: 0 /HPF
SPECIFIC GRAVITY URINE: 1
T4 FREE SERPL-MCNC: 0.9 NG/DL
TSH SERPL-ACNC: 1.06 UIU/ML
UROBILINOGEN URINE: 0.2 MG/DL
VIT B12 SERPL-MCNC: 867 PG/ML
WHITE BLOOD CELLS URINE: 0 /HPF

## 2023-10-30 ENCOUNTER — APPOINTMENT (OUTPATIENT)
Dept: CT IMAGING | Facility: CLINIC | Age: 63
End: 2023-10-30

## 2023-11-01 ENCOUNTER — APPOINTMENT (OUTPATIENT)
Dept: MAMMOGRAPHY | Facility: CLINIC | Age: 63
End: 2023-11-01
Payer: COMMERCIAL

## 2023-11-01 ENCOUNTER — APPOINTMENT (OUTPATIENT)
Dept: ULTRASOUND IMAGING | Facility: CLINIC | Age: 63
End: 2023-11-01
Payer: COMMERCIAL

## 2023-11-01 ENCOUNTER — OUTPATIENT (OUTPATIENT)
Dept: OUTPATIENT SERVICES | Facility: HOSPITAL | Age: 63
LOS: 1 days | End: 2023-11-01
Payer: COMMERCIAL

## 2023-11-01 ENCOUNTER — APPOINTMENT (OUTPATIENT)
Dept: RADIOLOGY | Facility: CLINIC | Age: 63
End: 2023-11-01
Payer: COMMERCIAL

## 2023-11-01 ENCOUNTER — RESULT REVIEW (OUTPATIENT)
Age: 63
End: 2023-11-01

## 2023-11-01 DIAGNOSIS — Z98.890 OTHER SPECIFIED POSTPROCEDURAL STATES: Chronic | ICD-10-CM

## 2023-11-01 DIAGNOSIS — M54.12 RADICULOPATHY, CERVICAL REGION: ICD-10-CM

## 2023-11-01 DIAGNOSIS — G95.9 DISEASE OF SPINAL CORD, UNSPECIFIED: ICD-10-CM

## 2023-11-01 DIAGNOSIS — Z98.1 ARTHRODESIS STATUS: Chronic | ICD-10-CM

## 2023-11-01 DIAGNOSIS — M48.02 SPINAL STENOSIS, CERVICAL REGION: ICD-10-CM

## 2023-11-01 PROCEDURE — 77063 BREAST TOMOSYNTHESIS BI: CPT

## 2023-11-01 PROCEDURE — 72148 MRI LUMBAR SPINE W/O DYE: CPT | Mod: 26

## 2023-11-01 PROCEDURE — 77080 DXA BONE DENSITY AXIAL: CPT

## 2023-11-01 PROCEDURE — 71046 X-RAY EXAM CHEST 2 VIEWS: CPT | Mod: 26

## 2023-11-01 PROCEDURE — 72141 MRI NECK SPINE W/O DYE: CPT

## 2023-11-01 PROCEDURE — 72141 MRI NECK SPINE W/O DYE: CPT | Mod: 26

## 2023-11-01 PROCEDURE — 77067 SCR MAMMO BI INCL CAD: CPT

## 2023-11-01 PROCEDURE — 76641 ULTRASOUND BREAST COMPLETE: CPT | Mod: 50

## 2023-11-01 PROCEDURE — 72148 MRI LUMBAR SPINE W/O DYE: CPT

## 2023-11-01 PROCEDURE — 71046 X-RAY EXAM CHEST 2 VIEWS: CPT

## 2023-11-02 ENCOUNTER — APPOINTMENT (OUTPATIENT)
Dept: INTERNAL MEDICINE | Facility: CLINIC | Age: 63
End: 2023-11-02
Payer: COMMERCIAL

## 2023-11-02 VITALS
HEART RATE: 69 BPM | SYSTOLIC BLOOD PRESSURE: 114 MMHG | BODY MASS INDEX: 26.66 KG/M2 | HEIGHT: 65 IN | RESPIRATION RATE: 14 BRPM | DIASTOLIC BLOOD PRESSURE: 71 MMHG | WEIGHT: 160 LBS | OXYGEN SATURATION: 98 % | TEMPERATURE: 98.7 F

## 2023-11-02 DIAGNOSIS — J06.9 ACUTE UPPER RESPIRATORY INFECTION, UNSPECIFIED: ICD-10-CM

## 2023-11-02 DIAGNOSIS — R05.9 COUGH, UNSPECIFIED: ICD-10-CM

## 2023-11-02 PROCEDURE — 99214 OFFICE O/P EST MOD 30 MIN: CPT

## 2023-11-02 RX ORDER — PREDNISONE 20 MG/1
20 TABLET ORAL
Qty: 10 | Refills: 0 | Status: ACTIVE | COMMUNITY
Start: 2023-11-02 | End: 1900-01-01

## 2023-11-02 RX ORDER — AZITHROMYCIN 250 MG/1
250 TABLET, FILM COATED ORAL
Qty: 1 | Refills: 0 | Status: ACTIVE | COMMUNITY
Start: 2023-11-02 | End: 1900-01-01

## 2023-11-02 RX ORDER — FLUTICASONE PROPIONATE AND SALMETEROL 250; 50 UG/1; UG/1
250-50 POWDER RESPIRATORY (INHALATION)
Qty: 1 | Refills: 0 | Status: ACTIVE | COMMUNITY
Start: 2023-11-02 | End: 1900-01-01

## 2023-11-03 ENCOUNTER — APPOINTMENT (OUTPATIENT)
Dept: CT IMAGING | Facility: CLINIC | Age: 63
End: 2023-11-03

## 2023-11-09 ENCOUNTER — APPOINTMENT (OUTPATIENT)
Dept: OTOLARYNGOLOGY | Facility: CLINIC | Age: 63
End: 2023-11-09
Payer: COMMERCIAL

## 2023-11-09 VITALS
DIASTOLIC BLOOD PRESSURE: 75 MMHG | WEIGHT: 160 LBS | HEART RATE: 66 BPM | HEIGHT: 65 IN | TEMPERATURE: 98 F | SYSTOLIC BLOOD PRESSURE: 134 MMHG | BODY MASS INDEX: 26.66 KG/M2

## 2023-11-09 DIAGNOSIS — R09.82 POSTNASAL DRIP: ICD-10-CM

## 2023-11-09 PROCEDURE — 99214 OFFICE O/P EST MOD 30 MIN: CPT | Mod: 25

## 2023-11-09 PROCEDURE — 31231 NASAL ENDOSCOPY DX: CPT

## 2023-11-09 RX ORDER — LEVOFLOXACIN 500 MG/1
500 TABLET, FILM COATED ORAL DAILY
Qty: 7 | Refills: 0 | Status: ACTIVE | COMMUNITY
Start: 2023-11-09 | End: 1900-01-01

## 2023-11-09 RX ORDER — METHYLPREDNISOLONE 4 MG/1
4 TABLET ORAL
Qty: 1 | Refills: 0 | Status: ACTIVE | COMMUNITY
Start: 2023-11-09 | End: 1900-01-01

## 2023-11-22 ENCOUNTER — RX RENEWAL (OUTPATIENT)
Age: 63
End: 2023-11-22

## 2023-11-30 ENCOUNTER — APPOINTMENT (OUTPATIENT)
Dept: CT IMAGING | Facility: CLINIC | Age: 63
End: 2023-11-30
Payer: COMMERCIAL

## 2023-11-30 ENCOUNTER — NON-APPOINTMENT (OUTPATIENT)
Age: 63
End: 2023-11-30

## 2023-11-30 PROCEDURE — 75574 CT ANGIO HRT W/3D IMAGE: CPT

## 2023-12-08 ENCOUNTER — NON-APPOINTMENT (OUTPATIENT)
Age: 63
End: 2023-12-08

## 2023-12-18 ENCOUNTER — NON-APPOINTMENT (OUTPATIENT)
Age: 63
End: 2023-12-18

## 2023-12-18 ENCOUNTER — APPOINTMENT (OUTPATIENT)
Dept: ELECTROPHYSIOLOGY | Facility: CLINIC | Age: 63
End: 2023-12-18
Payer: COMMERCIAL

## 2023-12-18 VITALS — RESPIRATION RATE: 14 BRPM | DIASTOLIC BLOOD PRESSURE: 74 MMHG | SYSTOLIC BLOOD PRESSURE: 122 MMHG | HEART RATE: 68 BPM

## 2023-12-18 DIAGNOSIS — I49.5 SICK SINUS SYNDROME: ICD-10-CM

## 2023-12-18 PROCEDURE — 93280 PM DEVICE PROGR EVAL DUAL: CPT

## 2024-01-12 ENCOUNTER — RX RENEWAL (OUTPATIENT)
Age: 64
End: 2024-01-12

## 2024-01-17 ENCOUNTER — NON-APPOINTMENT (OUTPATIENT)
Age: 64
End: 2024-01-17

## 2024-01-18 ENCOUNTER — EMERGENCY (EMERGENCY)
Facility: HOSPITAL | Age: 64
LOS: 1 days | Discharge: HOME | End: 2024-01-18
Attending: EMERGENCY MEDICINE | Admitting: EMERGENCY MEDICINE
Payer: COMMERCIAL

## 2024-01-18 ENCOUNTER — OUTPATIENT (OUTPATIENT)
Dept: OUTPATIENT SERVICES | Facility: HOSPITAL | Age: 64
LOS: 1 days | End: 2024-01-18
Payer: COMMERCIAL

## 2024-01-18 ENCOUNTER — APPOINTMENT (OUTPATIENT)
Dept: RADIOLOGY | Facility: IMAGING CENTER | Age: 64
End: 2024-01-18
Payer: COMMERCIAL

## 2024-01-18 ENCOUNTER — APPOINTMENT (OUTPATIENT)
Dept: NEUROSURGERY | Facility: CLINIC | Age: 64
End: 2024-01-18
Payer: COMMERCIAL

## 2024-01-18 VITALS
TEMPERATURE: 98 F | DIASTOLIC BLOOD PRESSURE: 84 MMHG | RESPIRATION RATE: 17 BRPM | HEART RATE: 85 BPM | SYSTOLIC BLOOD PRESSURE: 128 MMHG | OXYGEN SATURATION: 97 %

## 2024-01-18 VITALS
WEIGHT: 160.06 LBS | OXYGEN SATURATION: 99 % | TEMPERATURE: 98 F | HEIGHT: 65.5 IN | SYSTOLIC BLOOD PRESSURE: 131 MMHG | HEART RATE: 65 BPM | DIASTOLIC BLOOD PRESSURE: 78 MMHG | RESPIRATION RATE: 16 BRPM

## 2024-01-18 VITALS
OXYGEN SATURATION: 98 % | HEIGHT: 65 IN | BODY MASS INDEX: 26.66 KG/M2 | DIASTOLIC BLOOD PRESSURE: 68 MMHG | WEIGHT: 160 LBS | HEART RATE: 77 BPM | SYSTOLIC BLOOD PRESSURE: 105 MMHG

## 2024-01-18 DIAGNOSIS — Z98.890 OTHER SPECIFIED POSTPROCEDURAL STATES: Chronic | ICD-10-CM

## 2024-01-18 DIAGNOSIS — M54.16 RADICULOPATHY, LUMBAR REGION: ICD-10-CM

## 2024-01-18 DIAGNOSIS — M47.816 SPONDYLOSIS WITHOUT MYELOPATHY OR RADICULOPATHY, LUMBAR REGION: ICD-10-CM

## 2024-01-18 DIAGNOSIS — Z98.1 ARTHRODESIS STATUS: Chronic | ICD-10-CM

## 2024-01-18 PROCEDURE — 96375 TX/PRO/DX INJ NEW DRUG ADDON: CPT

## 2024-01-18 PROCEDURE — 70450 CT HEAD/BRAIN W/O DYE: CPT | Mod: MA

## 2024-01-18 PROCEDURE — 76376 3D RENDER W/INTRP POSTPROCES: CPT | Mod: 26

## 2024-01-18 PROCEDURE — 72131 CT LUMBAR SPINE W/O DYE: CPT | Mod: 26,MA

## 2024-01-18 PROCEDURE — 99284 EMERGENCY DEPT VISIT MOD MDM: CPT | Mod: 25

## 2024-01-18 PROCEDURE — 72125 CT NECK SPINE W/O DYE: CPT | Mod: 26,MA

## 2024-01-18 PROCEDURE — 99214 OFFICE O/P EST MOD 30 MIN: CPT

## 2024-01-18 PROCEDURE — 72100 X-RAY EXAM L-S SPINE 2/3 VWS: CPT

## 2024-01-18 PROCEDURE — 72192 CT PELVIS W/O DYE: CPT | Mod: 26,MA

## 2024-01-18 PROCEDURE — 76376 3D RENDER W/INTRP POSTPROCES: CPT

## 2024-01-18 PROCEDURE — 96365 THER/PROPH/DIAG IV INF INIT: CPT

## 2024-01-18 PROCEDURE — 72192 CT PELVIS W/O DYE: CPT | Mod: MA

## 2024-01-18 PROCEDURE — 72100 X-RAY EXAM L-S SPINE 2/3 VWS: CPT | Mod: 26

## 2024-01-18 PROCEDURE — 72125 CT NECK SPINE W/O DYE: CPT | Mod: MA

## 2024-01-18 PROCEDURE — 72131 CT LUMBAR SPINE W/O DYE: CPT | Mod: MA

## 2024-01-18 PROCEDURE — 99284 EMERGENCY DEPT VISIT MOD MDM: CPT

## 2024-01-18 PROCEDURE — 70450 CT HEAD/BRAIN W/O DYE: CPT | Mod: 26,MA

## 2024-01-18 RX ORDER — CYCLOBENZAPRINE HYDROCHLORIDE 10 MG/1
1 TABLET, FILM COATED ORAL
Qty: 15 | Refills: 0
Start: 2024-01-18 | End: 2024-01-22

## 2024-01-18 RX ORDER — ACETAMINOPHEN 500 MG
1000 TABLET ORAL ONCE
Refills: 0 | Status: COMPLETED | OUTPATIENT
Start: 2024-01-18 | End: 2024-01-18

## 2024-01-18 RX ORDER — KETOROLAC TROMETHAMINE 30 MG/ML
30 SYRINGE (ML) INJECTION ONCE
Refills: 0 | Status: DISCONTINUED | OUTPATIENT
Start: 2024-01-18 | End: 2024-01-18

## 2024-01-18 RX ORDER — METHOCARBAMOL 500 MG/1
500 TABLET, FILM COATED ORAL 3 TIMES DAILY
Qty: 60 | Refills: 0 | Status: ACTIVE | COMMUNITY
Start: 2024-01-18 | End: 1900-01-01

## 2024-01-18 RX ORDER — LIDOCAINE 4 G/100G
1 CREAM TOPICAL ONCE
Refills: 0 | Status: COMPLETED | OUTPATIENT
Start: 2024-01-18 | End: 2024-01-18

## 2024-01-18 RX ADMIN — Medication 1000 MILLIGRAM(S): at 16:35

## 2024-01-18 RX ADMIN — Medication 30 MILLIGRAM(S): at 18:00

## 2024-01-18 RX ADMIN — Medication 1000 MILLIGRAM(S): at 17:00

## 2024-01-18 RX ADMIN — LIDOCAINE 1 PATCH: 4 CREAM TOPICAL at 16:19

## 2024-01-18 RX ADMIN — Medication 30 MILLIGRAM(S): at 17:51

## 2024-01-18 RX ADMIN — Medication 400 MILLIGRAM(S): at 16:19

## 2024-01-18 NOTE — ED ADULT NURSE NOTE - OBJECTIVE STATEMENT
Pt. received alert and oriented x3 with chief complaint of trip and fall hurting left groin/leg and head area. Pt. denies LOC.

## 2024-01-18 NOTE — ED PROVIDER NOTE - CARE PLAN
1 Principal Discharge DX:	Fall on same level from slipping, tripping and stumbling  Secondary Diagnosis:	Back pain

## 2024-01-18 NOTE — PHYSICAL EXAM
[General Appearance - Alert] : alert [General Appearance - In No Acute Distress] : in no acute distress [Oriented To Time, Place, And Person] : oriented to person, place, and time [Impaired Insight] : insight and judgment were intact [Affect] : the affect was normal [Person] : oriented to person [Place] : oriented to place [Time] : oriented to time [Short Term Intact] : short term memory intact [Remote Intact] : remote memory intact [Registration Intact] : recent registration memory intact [Span Intact] : the attention span was normal [Concentration Intact] : normal concentrating ability [Visual Intact] : visual attention was ~T not ~L decreased [Fluency] : fluency intact [Comprehension] : comprehension intact [Reading] : reading intact [Current Events] : adequate knowledge of current events [Past History] : adequate knowledge of personal past history [Vocabulary] : adequate range of vocabulary [I: Normal Smell] : smell intact bilaterally [Cranial Nerves Optic (II)] : visual acuity intact bilaterally,  pupils equal round and reactive to light Patient expressed no known problems or needs [Cranial Nerves Trigeminal (V)] : facial sensation intact symmetrically [Cranial Nerves Facial (VII)] : face symmetrical [Cranial Nerves Glossopharyngeal (IX)] : tongue and palate midline [Cranial Nerves Accessory (XI - Cranial And Spinal)] : head turning and shoulder shrug symmetric [Motor Strength] : muscle strength was normal in all four extremities [Involuntary Movements] : no involuntary movements were seen [No Muscle Atrophy] : normal bulk in all four extremities [Motor Handedness Right-Handed] : the patient is right hand dominant [Sensation Tactile Decrease] : light touch was intact [Sensation Pain / Temperature Decrease] : pain and temperature was intact [Sensation Vibration Decrease] : vibration was intact [2+] : Brachioradialis left 2+ [4+] : Patella left 4+ [3+] : Ankle jerk left 3+ [Incision CDI] : was clean, dry and intact [No Visual Abnormalities] : no visible abnormailities [Intact] : all motor groups within normal limits of strength and tone bilaterally [Sclera] : the sclera and conjunctiva were normal [PERRL With Normal Accommodation] : pupils were equal in size, round, reactive to light, with normal accommodation [Extraocular Movements] : extraocular movements were intact [Outer Ear] : the ears and nose were normal in appearance [Oropharynx] : the oropharynx was normal [Neck Appearance] : the appearance of the neck was normal [Neck Cervical Mass (___cm)] : no neck mass was observed [Jugular Venous Distention Increased] : there was no jugular-venous distention [Thyroid Diffuse Enlargement] : the thyroid was not enlarged [Thyroid Nodule] : there were no palpable thyroid nodules [Auscultation Breath Sounds / Voice Sounds] : lungs were clear to auscultation bilaterally [Heart Rate And Rhythm] : heart rate was normal and rhythm regular [Heart Sounds] : normal S1 and S2 [Heart Sounds Gallop] : no gallops [Murmurs] : no murmurs [Heart Sounds Pericardial Friction Rub] : no pericardial rub [Full Pulse] : the pedal pulses are present [Edema] : there was no peripheral edema [Bowel Sounds] : normal bowel sounds [Abdomen Soft] : soft [Abdomen Tenderness] : non-tender [Abdomen Mass (___ Cm)] : no abdominal mass palpated [No CVA Tenderness] : no ~M costovertebral angle tenderness [No Spinal Tenderness] : no spinal tenderness [Abnormal Walk] : normal gait [Nail Clubbing] : no clubbing  or cyanosis of the fingernails [Musculoskeletal - Swelling] : no joint swelling seen [Motor Tone] : muscle strength and tone were normal [Skin Color & Pigmentation] : normal skin color and pigmentation [Skin Turgor] : normal skin turgor [] : no rash [Limited Balance] : balance was intact
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
## 2024-01-18 NOTE — ED PROVIDER NOTE - OBJECTIVE STATEMENT
63 F hx SSS s/p PM, epilepsy (on lamictal), cervical spinal fusion C4-5 2019, presents s/p slip and fall ~8:45AM . Slipped on ice, fell backwards. No LOC, no anticoagulation. Was assisted to get up and was able to ambulate afterwards. Took advil. Went to her scheduled nsgy appointment, had xray lumbar done, no results yet. Was concerned about HA and neck due to prior surgery. Pain to lower back/pelvis as well.

## 2024-01-18 NOTE — ED ADULT TRIAGE NOTE - CHIEF COMPLAINT QUOTE
Slip and fall on ice this morning and slammed down on back. S/p sx on c4 and c5 5 years ago. Now c/o headache, neck pain, lower back pain and left hip/groin pain. Denies any thinner. "I feel like I want to close my eyes. the pain in my head is behinds eyes and orbits

## 2024-01-18 NOTE — ED PROVIDER NOTE - PATIENT PORTAL LINK FT
You can access the FollowMyHealth Patient Portal offered by [facility] by registering at the following website: [email or web address]. By joining [facility]’s FollowMyHealth portal, you will also be able to view your health information using other applications (apps) compatible with our system.

## 2024-01-18 NOTE — ED ADULT NURSE NOTE - NSFALLRISKINTERV_ED_ALL_ED

## 2024-01-18 NOTE — ED PROVIDER NOTE - NSFOLLOWUPINSTRUCTIONS_ED_ALL_ED_FT
Tylenol, ibuprofen, rest, hydration, ice, lidocaine, flexeril as discussed.  Avoid heavy lifting or strenuous activity.   Follow up with your neurosurgeon.  Return for worsening or concerning symptoms.      Acute Back Pain, Adult  Acute back pain is sudden and usually short-lived. It is often caused by an injury to the muscles and tissues in the back. The injury may result from:  A muscle, tendon, or ligament getting overstretched or torn. Ligaments are tissues that connect bones to each other. Lifting something improperly can cause a back strain.  Wear and tear (degeneration) of the spinal disks. Spinal disks are circular tissue that provide cushioning between the bones of the spine (vertebrae).  Twisting motions, such as while playing sports or doing yard work.  A hit to the back.  Arthritis.  You may have a physical exam, lab tests, and imaging tests to find the cause of your pain. Acute back pain usually goes away with rest and home care.    Follow these instructions at home:  Managing pain, stiffness, and swelling    Take over-the-counter and prescription medicines only as told by your health care provider. Treatment may include medicines for pain and inflammation that are taken by mouth or applied to the skin, or muscle relaxants.  Your health care provider may recommend applying ice during the first 24–48 hours after your pain starts. To do this:  Put ice in a plastic bag.  Place a towel between your skin and the bag.  Leave the ice on for 20 minutes, 2–3 times a day.  Remove the ice if your skin turns bright red. This is very important. If you cannot feel pain, heat, or cold, you have a greater risk of damage to the area.  If directed, apply heat to the affected area as often as told by your health care provider. Use the heat source that your health care provider recommends, such as a moist heat pack or a heating pad.  Place a towel between your skin and the heat source.  Leave the heat on for 20–30 minutes.  Remove the heat if your skin turns bright red. This is especially important if you are unable to feel pain, heat, or cold. You have a greater risk of getting burned.  Activity    Comparisons of good and bad posture while driving, standing, sitting at a desk, and lifting heavy objects.  Do not stay in bed. Staying in bed for more than 1–2 days can delay your recovery.  Sit up and stand up straight. Avoid leaning forward when you sit or hunching over when you stand.  If you work at a desk, sit close to it so you do not need to lean over. Keep your chin tucked in. Keep your neck drawn back, and keep your elbows bent at a 90-degree angle (right angle).  Sit high and close to the steering wheel when you drive. Add lower back (lumbar) support to your car seat, if needed.  Take short walks on even surfaces as soon as you are able. Try to increase the length of time you walk each day.  Do not sit, drive, or  one place for more than 30 minutes at a time. Sitting or standing for long periods of time can put stress on your back.  Do not drive or use heavy machinery while taking prescription pain medicine.  Use proper lifting techniques. When you bend and lift, use positions that put less stress on your back:  Bend your knees.  Keep the load close to your body.  Avoid twisting.  Exercise regularly as told by your health care provider. Exercising helps your back heal faster and helps prevent back injuries by keeping muscles strong and flexible.  Work with a physical therapist to make a safe exercise program, as recommended by your health care provider. Do any exercises as told by your physical therapist.  Lifestyle    Maintain a healthy weight. Extra weight puts stress on your back and makes it difficult to have good posture.  Avoid activities or situations that make you feel anxious or stressed. Stress and anxiety increase muscle tension and can make back pain worse. Learn ways to manage anxiety and stress, such as through exercise.  General instructions    Sleep on a firm mattress in a comfortable position. Try lying on your side with your knees slightly bent. If you lie on your back, put a pillow under your knees.  Keep your head and neck in a straight line with your spine (neutral position) when using electronic equipment like smartphones or pads. To do this:  Raise your smartphone or pad to look at it instead of bending your head or neck to look down.  Put the smartphone or pad at the level of your face while looking at the screen.  Follow your treatment plan as told by your health care provider. This may include:  Cognitive or behavioral therapy.  Acupuncture or massage therapy.  Meditation or yoga.  Contact a health care provider if:  You have pain that is not relieved with rest or medicine.  You have increasing pain going down into your legs or buttocks.  Your pain does not improve after 2 weeks.  You have pain at night.  You lose weight without trying.  You have a fever or chills.  You develop nausea or vomiting.  You develop abdominal pain.  Get help right away if:  You develop new bowel or bladder control problems.  You have unusual weakness or numbness in your arms or legs.  You feel faint.  These symptoms may represent a serious problem that is an emergency. Do not wait to see if the symptoms will go away. Get medical help right away. Call your local emergency services (911 in the [place] Do not drive yourself to the hospital.    Summary  Acute back pain is sudden and usually short-lived.  Use proper lifting techniques. When you bend and lift, use positions that put less stress on your back.  Take over-the-counter and prescription medicines only as told by your health care provider, and apply heat or ice as told.  This information is not intended to replace advice given to you by your health care provider. Make sure you discuss any questions you have with your health care provider.

## 2024-01-18 NOTE — ED PROVIDER NOTE - CLINICAL SUMMARY MEDICAL DECISION MAKING FREE TEXT BOX
Patient is a pleasant 63-year-old white female with past history of seizure for which she is well-controlled with Lamictal permanent pacemaker endometriosis sleep apnea sick sinus syndrome and cervical radiculopathy for which she has had surgery for now presenting to the emergency department here at [facility] following slip and fall on ice this morning complaining of mild headache neck pain low back pain and slight left inguinal pain.  Patient went to her private neurosurgeon who evaluated patient this morning did plain x-rays of the lumbar spine of which report is not available but as a result of continued pain patient presents here for further evaluation care treatment and management.  This case will require thorough evaluation as well as imaging of the head cervical spine lumbar spine and bony pelvis.
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
## 2024-01-18 NOTE — ED PROVIDER NOTE - ATTENDING APP SHARED VISIT CONTRIBUTION OF CARE
Examination reveals a very pleasant white female lying comfortably on a stretcher in no acute distress.  HEENT normocephalic atraumatic extraocular movements were intact pupils are grossly normal neck is supple old healed anterior neck scar from cervical spine surgery seen.  Posterior cervical spine midline nontender thoracic and lumbar spine nontender back flanks without any bruising bony pelvis slight pain upon compression of the pelvis on the left bilateral hips nontender.  I agree with plan and management outlined by PA.  Also please note independent history and physical was performed by myself.
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
## 2024-01-18 NOTE — ED PROVIDER NOTE - NSICDXPASTSURGICALHX_GEN_ALL_CORE_FT
PAST SURGICAL HISTORY:  h/o cardiac pacemaker insertion - 2004 battery replaced 2017, [employer] Model L331/ serial [id number], interrogated 12/15/2020    H/O colonoscopy     History of D&C 2012    History of D&C - 15 yrs ago     History of laparoscopy - many yrs ago     S/P cervical spinal fusion C4-5 2019    S/P ear surgery s/p wound closure of left ear s/p mechanical fall

## 2024-01-18 NOTE — DATA REVIEWED
[de-identified] : C-spine (10/23), L-spine (10/23)  PACS [de-identified] : Cervical and lumbar (10/23) PACS

## 2024-01-18 NOTE — ED ADULT NURSE NOTE - NSICDXPASTSURGICALHX_GEN_ALL_CORE_FT
PAST SURGICAL HISTORY:  h/o cardiac pacemaker insertion - 2004 battery replaced 2017, [employer] Model L331/ serial [id number], interrogated 12/15/2020    H/O colonoscopy     History of D&C 2012    History of D&C - 15 yrs ago     History of laparoscopy - many yrs ago     S/P cervical spinal fusion C4-5 2019    S/P ear surgery s/p wound closure of left ear s/p mechanical fall

## 2024-01-31 ENCOUNTER — RX RENEWAL (OUTPATIENT)
Age: 64
End: 2024-01-31

## 2024-02-05 ENCOUNTER — RX RENEWAL (OUTPATIENT)
Age: 64
End: 2024-02-05

## 2024-02-12 ENCOUNTER — APPOINTMENT (OUTPATIENT)
Dept: CT IMAGING | Facility: CLINIC | Age: 64
End: 2024-02-12
Payer: COMMERCIAL

## 2024-02-12 ENCOUNTER — APPOINTMENT (OUTPATIENT)
Dept: OTOLARYNGOLOGY | Facility: CLINIC | Age: 64
End: 2024-02-12
Payer: COMMERCIAL

## 2024-02-12 ENCOUNTER — OUTPATIENT (OUTPATIENT)
Dept: OUTPATIENT SERVICES | Facility: HOSPITAL | Age: 64
LOS: 1 days | End: 2024-02-12
Payer: COMMERCIAL

## 2024-02-12 VITALS
HEIGHT: 65 IN | BODY MASS INDEX: 26.66 KG/M2 | WEIGHT: 160 LBS | HEART RATE: 68 BPM | DIASTOLIC BLOOD PRESSURE: 67 MMHG | SYSTOLIC BLOOD PRESSURE: 116 MMHG

## 2024-02-12 DIAGNOSIS — R05.3 CHRONIC COUGH: ICD-10-CM

## 2024-02-12 DIAGNOSIS — R09.81 NASAL CONGESTION: ICD-10-CM

## 2024-02-12 DIAGNOSIS — Z98.890 OTHER SPECIFIED POSTPROCEDURAL STATES: Chronic | ICD-10-CM

## 2024-02-12 DIAGNOSIS — Z98.1 ARTHRODESIS STATUS: Chronic | ICD-10-CM

## 2024-02-12 PROCEDURE — 70486 CT MAXILLOFACIAL W/O DYE: CPT

## 2024-02-12 PROCEDURE — 70486 CT MAXILLOFACIAL W/O DYE: CPT | Mod: 26

## 2024-02-12 PROCEDURE — 31231 NASAL ENDOSCOPY DX: CPT

## 2024-02-12 PROCEDURE — 99214 OFFICE O/P EST MOD 30 MIN: CPT | Mod: 25

## 2024-02-12 RX ORDER — AMOXICILLIN AND CLAVULANATE POTASSIUM 875; 125 MG/1; MG/1
875-125 TABLET, COATED ORAL TWICE DAILY
Qty: 28 | Refills: 0 | Status: ACTIVE | COMMUNITY
Start: 2024-02-12 | End: 1900-01-01

## 2024-02-12 RX ORDER — LORATADINE 5 MG/5 ML
0.05 SOLUTION, ORAL ORAL
Qty: 1 | Refills: 0 | Status: ACTIVE | COMMUNITY
Start: 2024-02-12 | End: 1900-01-01

## 2024-02-12 RX ORDER — METHYLPREDNISOLONE 4 MG/1
4 TABLET ORAL
Qty: 1 | Refills: 0 | Status: ACTIVE | COMMUNITY
Start: 2024-02-12 | End: 1900-01-01

## 2024-02-12 RX ORDER — BENZONATATE 100 MG/1
100 CAPSULE ORAL 3 TIMES DAILY
Qty: 30 | Refills: 0 | Status: ACTIVE | COMMUNITY
Start: 2024-02-12 | End: 1900-01-01

## 2024-02-12 NOTE — ASSESSMENT
[FreeTextEntry1] : 62 year old female following up with nasal congestion, sinus pressure and persistent cough. On exam, copious thick secretions bilaterally.  - We will proceed with a regiment of decongestants, antibiotics and irrigation to try to break the cycle of inflammation and obstruction. this will treat the acute symptoms and will hopefully allow for maintenance therapy to reach disease areas and avoid further intervention. - continue flonase  - discussed sending for CT scan of sinuses to further evaluate, if narrowed or inflamed can plan for office procedure.   - Risks benefits and alternatives of endoscopic sinus surgery with possible image guidance possible septoplasty bilateral inferior turbinate reduction discussed with patient at length. Risks discussed include but were not limited to bleeding, infection, persistent symptoms, scarring, injury to the skull base and brain and CSF leak, injury to orbit and eye, crusting, septal hematoma, septal perforation results in whistling, empty nose syndrome, crusting and bleeding as well as continued nasal obstruction etc.  were discussed. For polyps, discussed very high recurrence rate that require future surveillance, maintenance and likelihood of need for further procedures. Also discussed option of office balloon/hybrid balloon dilation and office sinus surgery - similar risk profile, will not address septum/ turbs and takes a generally more conservative approach with quicker recovery, however higher possibility for need for future intervention. - will call with results of CT, she would like to consider a procedure, infections are very debilitating and triggering pulm issues as well reqruiing heavy Rx

## 2024-02-12 NOTE — PROCEDURE
[FreeTextEntry6] : Procedure performed: Nasal Endoscopy- Diagnostic Pre-op indication(s): nasal congestion Post-op indication(s): nasal congestion Verbal and/or written consent obtained from patient Anterior rhinoscopy insufficient to account for symptoms Scope #: 3, flexible fiber optic telescope The scope was introduced in the nasal passage between the middle and inferior turbinates to exam the inferior portion of the middle meatus and the fontanelle, as well as the maxillary ostia. Next, the scope was passed medically and posteriorly to the middle turbinates to examine the sphenoethmoid recess and the superior turbinate region.  Upon visualization the finders are as follows: Nasal Septum: sigmoidal septal deviation Bilateral - Mucosa: boggy turbinates, Mucous: thick secretions, Polyp: not seen, Inferior Turbinate: boggy, Middle Turbinate: normal, Superior Turbinate: normal, Inferior Meatus: narrow, Middle Meatus: narrow, Super Meatus: normal, Sphenoethmoidal Recess: clear

## 2024-02-12 NOTE — HISTORY OF PRESENT ILLNESS
[de-identified] : 62 year old female presents with persistent cough and post nasal drip for 2 months. She had bronchitis 2 months ago, states shes been getting recurrent bronchitis, 2-3 infections per year usually, took antibiotics but now has cough and post nasal drip that isnt going away. Saw a pulmonologist last week, but lungs were clear. Tried azelastine but didn't like how she felt on it. Using flonase twice a day for last month. Taking mucinex for cough.  Patient states she does have a history of sinus infections, 2-3 in last year. Has pressure at her forehead which goes away between infections.  Took 20mg of prednisone when bronchitis started.   Patient following up with persistent cough and post nasal drip. States in the morning she is coughing constantly and has constant PND. No improvement with medrol dose pack - reduced cough when on it, omeprazole or tessalon pearls. Interested in doing a strep throat swab today. Patient states nose hurts since yesterday when she presses to blow it. Has had pressure at head for about a week. started advil cold and sinus which has calmed her symptoms a bit. Sleeps with honey lemon cough drop in mouth which helps her sleep, otherwise no relief.   Patient following up with persistent cough and post nasal drip. States after antibiotics, doing much better. Cough resolved. States has a tickle intermittently but otherwise doing much better. Has had stuffiness for last few days. Still has pressure at head intermittently. Stopped taking omeprazole, saw GI and going for endoscopy and colonoscopy at end of month.  no stuffy nose, can blow out some mucous  started sx in march with bronchitis   Patient following up with nasal congestion, cough, and sinus pressure for about 10 days. Patient was put on azithromycin and completed course on Tuesday. Was also put on afrin for 2 days and fluticasone twice a day daily. Last night started to feel worse and this morning felt much worse. Has had a productive cough and thick yellow mucus from nose.  [FreeTextEntry1] : Patient following up with nasal congestion, cough, and sinus pressure for about 7 days. States initially thought she mightve been getting better, but then got way worse. Coughing up thick yellow mucus. States has had massive headaches, taking advil cold and sinus as well as fluticasone nasal spray. Took tessalon pearls at night for cough otherwise cant breath. Has not had a CT scan yet. Has had 4 sinus infections in last 12 months.

## 2024-02-12 NOTE — CONSULT LETTER
[Please see my note below.] : Please see my note below. [FreeTextEntry1] : Dear Dr. NANCY HARVEY \par  I had the pleasure of evaluating your patient DANA GROSS, thank you for allowing us to participate in their care. please see full note detailing our visit below.\par  If you have any questions, please do not hesitate to call me and I would be happy to discuss further. \par  \par  Davis Madden M.D.\par  Attending Physician,  \par  Department of Otolaryngology - Head and Neck Surgery\par  [facility] \par  Office: [phone]\par  Fax: [phone]\par

## 2024-02-14 ENCOUNTER — NON-APPOINTMENT (OUTPATIENT)
Age: 64
End: 2024-02-14

## 2024-02-15 ENCOUNTER — NON-APPOINTMENT (OUTPATIENT)
Age: 64
End: 2024-02-15

## 2024-02-17 ENCOUNTER — APPOINTMENT (OUTPATIENT)
Dept: CT IMAGING | Facility: CLINIC | Age: 64
End: 2024-02-17

## 2024-02-21 ENCOUNTER — NON-APPOINTMENT (OUTPATIENT)
Age: 64
End: 2024-02-21

## 2024-02-21 RX ORDER — PROMETHAZINE HYDROCHLORIDE 12.5 MG/1
12.5 TABLET ORAL
Qty: 5 | Refills: 0 | Status: ACTIVE | COMMUNITY
Start: 2024-02-21 | End: 1900-01-01

## 2024-02-21 RX ORDER — NALOXONE HYDROCHLORIDE 4 MG/.1ML
4 SPRAY NASAL
Qty: 1 | Refills: 0 | Status: ACTIVE | COMMUNITY
Start: 2024-02-21 | End: 1900-01-01

## 2024-02-21 RX ORDER — PREDNISONE 10 MG/1
10 TABLET ORAL
Qty: 27 | Refills: 0 | Status: ACTIVE | COMMUNITY
Start: 2024-02-21 | End: 1900-01-01

## 2024-02-21 RX ORDER — DIAZEPAM 5 MG/1
5 TABLET ORAL
Qty: 2 | Refills: 0 | Status: ACTIVE | COMMUNITY
Start: 2024-02-21 | End: 1900-01-01

## 2024-02-21 RX ORDER — OXYCODONE AND ACETAMINOPHEN 5; 325 MG/1; MG/1
5-325 TABLET ORAL
Qty: 10 | Refills: 0 | Status: ACTIVE | COMMUNITY
Start: 2024-02-21 | End: 1900-01-01

## 2024-02-21 RX ORDER — AMOXICILLIN AND CLAVULANATE POTASSIUM 875; 125 MG/1; MG/1
875-125 TABLET, COATED ORAL
Qty: 28 | Refills: 0 | Status: ACTIVE | COMMUNITY
Start: 2024-02-21 | End: 1900-01-01

## 2024-02-22 ENCOUNTER — APPOINTMENT (OUTPATIENT)
Dept: SURGICAL ONCOLOGY | Facility: CLINIC | Age: 64
End: 2024-02-22

## 2024-02-23 RX ORDER — DIAZEPAM 5 MG/1
5 TABLET ORAL
Qty: 1 | Refills: 0 | Status: ACTIVE | COMMUNITY
Start: 2024-02-23 | End: 1900-01-01

## 2024-02-27 ENCOUNTER — APPOINTMENT (OUTPATIENT)
Dept: OTOLARYNGOLOGY | Facility: CLINIC | Age: 64
End: 2024-02-27
Payer: COMMERCIAL

## 2024-02-27 VITALS
DIASTOLIC BLOOD PRESSURE: 81 MMHG | BODY MASS INDEX: 26.33 KG/M2 | HEART RATE: 65 BPM | SYSTOLIC BLOOD PRESSURE: 128 MMHG | TEMPERATURE: 97.5 F | HEIGHT: 65 IN | OXYGEN SATURATION: 98 % | WEIGHT: 158 LBS

## 2024-02-27 PROCEDURE — 61782 SCAN PROC CRANIAL EXTRA: CPT

## 2024-02-27 PROCEDURE — 31295Z: CUSTOM | Mod: 50

## 2024-02-27 PROCEDURE — 31240 NSL/SNS NDSC CNCH BULL RESCJ: CPT | Mod: RT

## 2024-02-27 PROCEDURE — 31298Z NSL/SINS NDSC W/SINS DILAT: CUSTOM | Mod: 50

## 2024-02-27 NOTE — PROCEDURE
[FreeTextEntry3] : did IG pansinus balloon with total ethmoid with microdebrider and right parisa  tolerated well

## 2024-03-05 ENCOUNTER — NON-APPOINTMENT (OUTPATIENT)
Age: 64
End: 2024-03-05

## 2024-03-05 LAB — CORE LAB BIOPSY: NORMAL

## 2024-03-07 ENCOUNTER — APPOINTMENT (OUTPATIENT)
Dept: OTOLARYNGOLOGY | Facility: CLINIC | Age: 64
End: 2024-03-07
Payer: COMMERCIAL

## 2024-03-07 VITALS — HEIGHT: 65 IN | BODY MASS INDEX: 26.33 KG/M2 | WEIGHT: 158 LBS

## 2024-03-07 PROCEDURE — 99213 OFFICE O/P EST LOW 20 MIN: CPT | Mod: 25

## 2024-03-07 PROCEDURE — 31237 NSL/SINS NDSC SURG BX POLYPC: CPT | Mod: 50,58

## 2024-03-07 NOTE — ASSESSMENT
[FreeTextEntry1] : 63 year old female following up 1 week s/p IG pansinus balloon with total ethmoid with microdebrider and right parisa on 02/27/24. The patient was debrided bilaterally with rigid suction as a result of nasal crusting. breathing much improved after detriment. Patient should continue to avoid nose blowing, heavy lifting or exercising, and should start a regimen of over the counter nasal saline spray for moisturization of the nasal cavities. - will follow up in 2 weeks to assure no scarring and keep sinuses open - nasal irrigation - restart Flonase

## 2024-03-07 NOTE — CONSULT LETTER
[FreeTextEntry1] : Dear Dr. NANCY HARVEY  I had the pleasure of evaluating your patient DANA GROSS, thank you for allowing us to participate in their care. please see full note detailing our visit below. If you have any questions, please do not hesitate to call me and I would be happy to discuss further.   Davis Madden M.D. Attending Physician,   Department of Otolaryngology - Head and Neck Surgery [facility]  Office: [phone] Fax: [phone]

## 2024-03-07 NOTE — REASON FOR VISIT
[Post-Operative Visit] : a post-operative visit [FreeTextEntry2] : s/p IG pansinus balloon with total ethmoid with microdebrider and right parisa 02/27/24

## 2024-03-07 NOTE — HISTORY OF PRESENT ILLNESS
[de-identified] : Patient s/p IG pansinus balloon with total ethmoid with microdebrider and right parisa 02/27/24. Doing well, states feeling stuffy and has some pressure. Doing saline washes twice a day.

## 2024-03-08 ENCOUNTER — APPOINTMENT (OUTPATIENT)
Dept: DERMATOLOGY | Facility: CLINIC | Age: 64
End: 2024-03-08
Payer: COMMERCIAL

## 2024-03-08 PROCEDURE — 17110 DESTRUCTION B9 LES UP TO 14: CPT | Mod: 59

## 2024-03-08 PROCEDURE — 11103 TANGNTL BX SKIN EA SEP/ADDL: CPT | Mod: 59

## 2024-03-08 PROCEDURE — 99213 OFFICE O/P EST LOW 20 MIN: CPT | Mod: 25

## 2024-03-08 PROCEDURE — 11102 TANGNTL BX SKIN SINGLE LES: CPT | Mod: 59

## 2024-03-08 NOTE — ASSESSMENT
[FreeTextEntry1] : 1) benign findings as above- education  2) ISK The specified lesions were treated with liquid nitrogen cryotherapy.  Discussed risks including pain, blistering, crusting, discoloration, recurrence.  3) Shave bx location x2; right and left torso diagnosis: r/o ISK   Shave biopsy performed today over above location, risks and benefits discussed including incomplete removal, not enough tissue for diagnosis scarring and infection, informed consent obtained, pictures taken,  cleaned with alcohol and anesthetized with 1%lido+epi, 0.3 cc total, hemostasis obtained with monsels, vaseline and bandaid placed, tolerated well, wound care reviewed, specimen sent to pathology.

## 2024-03-08 NOTE — PHYSICAL EXAM
[FreeTextEntry3] : AAOx3, pleasant, NAD, no visual lymphadenopathy hair, scalp, face, nose, eyelids, ears, lips, oropharynx, neck, chest, abdomen, back, right arm, left arm, nails, and hands examined with all normal findings, pertinent findings include:  multiple benign nevi and lentigines + inflamed, waxy, keratotic papule on left torso verrucous papules on right and left torso

## 2024-03-13 ENCOUNTER — APPOINTMENT (OUTPATIENT)
Dept: NEUROLOGY | Facility: CLINIC | Age: 64
End: 2024-03-13
Payer: COMMERCIAL

## 2024-03-13 LAB — DERMATOLOGY BIOPSY: NORMAL

## 2024-03-13 PROCEDURE — 95886 MUSC TEST DONE W/N TEST COMP: CPT

## 2024-03-13 PROCEDURE — 95911 NRV CNDJ TEST 9-10 STUDIES: CPT

## 2024-03-21 ENCOUNTER — APPOINTMENT (OUTPATIENT)
Dept: OTOLARYNGOLOGY | Facility: CLINIC | Age: 64
End: 2024-03-21
Payer: COMMERCIAL

## 2024-03-21 VITALS — DIASTOLIC BLOOD PRESSURE: 68 MMHG | TEMPERATURE: 97.7 F | HEART RATE: 65 BPM | SYSTOLIC BLOOD PRESSURE: 113 MMHG

## 2024-03-21 PROCEDURE — 99213 OFFICE O/P EST LOW 20 MIN: CPT | Mod: 25

## 2024-03-21 PROCEDURE — 31237 NSL/SINS NDSC SURG BX POLYPC: CPT | Mod: 50

## 2024-03-21 NOTE — CONSULT LETTER
[FreeTextEntry1] : Dear Dr. NANCY HARVEY  I had the pleasure of evaluating your patient DANA GROSS, thank you for allowing us to participate in their care. please see full note detailing our visit below. If you have any questions, please do not hesitate to call me and I would be happy to discuss further.   Davis Madden M.D. Attending Physician,   Department of Otolaryngology - Head and Neck Surgery [facility]  Office: [phone] Fax: [phone]

## 2024-03-21 NOTE — HISTORY OF PRESENT ILLNESS
[de-identified] : Patient s/p IG pansinus balloon with total ethmoid with microdebrider and right parisa 02/27/24. Doing well, states feeling stuffy and has some pressure. Doing saline washes twice a day.  [FreeTextEntry1] : Patient s/p IG pansinus balloon with total ethmoid with microdebrider and right parisa 02/27/24. Nose feels sensitive, possibly from Flonase. States blood clots in washes have been reducing. No ryan bleeding. Doing washes twice a day. Pressure has been improving.

## 2024-03-21 NOTE — ASSESSMENT
[FreeTextEntry1] : 63 year old female following up s/p IG pansinus balloon with total ethmoid with microdebrider and right parisa on 02/27/24. The patient was debrided bilaterally with rigid suction as a result of nasal crusting. Breathing much improved after detriment. Patient should continue a regimen of over the counter nasal saline spray for moisturization of the nasal cavities. - will follow up in 3 weeks to assure no scarring and keep sinuses open - nasal irrigation - continue Flonase

## 2024-03-28 ENCOUNTER — APPOINTMENT (OUTPATIENT)
Dept: OTOLARYNGOLOGY | Facility: CLINIC | Age: 64
End: 2024-03-28
Payer: COMMERCIAL

## 2024-03-28 VITALS
BODY MASS INDEX: 26.33 KG/M2 | HEART RATE: 64 BPM | DIASTOLIC BLOOD PRESSURE: 72 MMHG | SYSTOLIC BLOOD PRESSURE: 125 MMHG | HEIGHT: 65 IN | WEIGHT: 158 LBS

## 2024-03-28 PROCEDURE — 31237 NSL/SINS NDSC SURG BX POLYPC: CPT | Mod: 50

## 2024-03-28 PROCEDURE — 99213 OFFICE O/P EST LOW 20 MIN: CPT | Mod: 25

## 2024-03-28 NOTE — HISTORY OF PRESENT ILLNESS
[de-identified] : Patient s/p IG pansinus balloon with total ethmoid with microdebrider and right parisa 02/27/24. Doing well, states feeling stuffy and has some pressure. Doing saline washes twice a day.   Patient s/p IG pansinus balloon with total ethmoid with microdebrider and right parisa 02/27/24. Nose feels sensitive, possibly from Flonase. States blood clots in washes have been reducing. No ryan bleeding. Doing washes twice a day. Pressure has been improving.  [FreeTextEntry1] : Patient s/p IG pansinus balloon with total ethmoid with microdebrider and right parisa 02/27/24. States entire right side of head has pressure and is stuffy. Initially yesterday had yellowish drainage from R nostril since Monday night which was like water. States today it seems more mucusy but still not feeling well, feels like she has another sinus infection. Taking advil cold and sinus.

## 2024-03-28 NOTE — PROCEDURE
[FreeTextEntry3] : procedure - bilateral endoscopic nasal debridement verbal consent obtained Bilateral nasal cavities inspected with #0 ridged sinus endoscope. septum appeared midline and turbinates well reduced. bilateral middle meatuses were explored and suction and agitator were used to open ostiums and open any forming scar bands. all sinuses were explored and open at end of procedure cleared out cyst right maxillary and some inflamed looking debris right - cipro powder applied

## 2024-03-28 NOTE — CONSULT LETTER
[FreeTextEntry1] : Dear Dr. NANCY HARVEY  I had the pleasure of evaluating your patient DANA GROSS, thank you for allowing us to participate in their care. please see full note detailing our visit below. If you have any questions, please do not hesitate to call me and I would be happy to discuss further.   Davis Madden M.D. Attending Physician,   Department of Otolaryngology - Head and Neck Surgery [facility]  Office: [phone] Fax: [phone]

## 2024-04-04 ENCOUNTER — APPOINTMENT (OUTPATIENT)
Dept: OTOLARYNGOLOGY | Facility: CLINIC | Age: 64
End: 2024-04-04
Payer: COMMERCIAL

## 2024-04-04 VITALS — SYSTOLIC BLOOD PRESSURE: 106 MMHG | HEART RATE: 72 BPM | DIASTOLIC BLOOD PRESSURE: 59 MMHG | TEMPERATURE: 97.5 F

## 2024-04-04 DIAGNOSIS — J32.4 CHRONIC PANSINUSITIS: ICD-10-CM

## 2024-04-04 DIAGNOSIS — R09.81 NASAL CONGESTION: ICD-10-CM

## 2024-04-04 DIAGNOSIS — J01.41 ACUTE RECURRENT PANSINUSITIS: ICD-10-CM

## 2024-04-04 DIAGNOSIS — J32.9 CHRONIC SINUSITIS, UNSPECIFIED: ICD-10-CM

## 2024-04-04 DIAGNOSIS — J34.89 OTHER SPECIFIED DISORDERS OF NOSE AND NASAL SINUSES: ICD-10-CM

## 2024-04-04 PROCEDURE — 99213 OFFICE O/P EST LOW 20 MIN: CPT | Mod: 25

## 2024-04-04 PROCEDURE — 31237 NSL/SINS NDSC SURG BX POLYPC: CPT | Mod: 50

## 2024-04-04 RX ORDER — AMOXICILLIN AND CLAVULANATE POTASSIUM 875; 125 MG/1; MG/1
875-125 TABLET, COATED ORAL
Qty: 20 | Refills: 0 | Status: ACTIVE | COMMUNITY
Start: 2024-04-04 | End: 1900-01-01

## 2024-04-08 ENCOUNTER — RX CHANGE (OUTPATIENT)
Age: 64
End: 2024-04-08

## 2024-04-08 RX ORDER — FLUTICASONE PROPIONATE 50 UG/1
50 SPRAY, METERED NASAL
Qty: 48 | Refills: 1 | Status: ACTIVE | COMMUNITY
Start: 2024-04-08 | End: 1900-01-01

## 2024-04-08 RX ORDER — FLUTICASONE PROPIONATE 50 UG/1
50 SPRAY, METERED NASAL
Qty: 16 | Refills: 2 | Status: DISCONTINUED | COMMUNITY
Start: 2024-03-07 | End: 2024-04-08

## 2024-04-08 NOTE — CONSULT LETTER
[FreeTextEntry1] : Dear Dr. NANCY HARVEY  I had the pleasure of evaluating your patient DANA GROSS, thank you for allowing us to participate in their care. please see full note detailing our visit below. If you have any questions, please do not hesitate to call me and I would be happy to discuss further.   Davis Madden M.D. Attending Physician,   Department of Otolaryngology - Head and Neck Surgery [facility]  Office: [phone] Fax: [phone]

## 2024-04-08 NOTE — ASSESSMENT
[FreeTextEntry1] : 63 year old female following up s/p IG pansinus balloon with total ethmoid with microdebrider and right parisa on 02/27/24. The patient was debrided bilaterally with rigid suction as a result of nasal crusting. Breathing much improved after detriment. Patient should continue a regimen of over the counter nasal saline spray for moisturization of the nasal cavities. - will follow up in 3 months to assure no scarring and keep sinuses open - nasal irrigation - continue Flonase

## 2024-04-08 NOTE — HISTORY OF PRESENT ILLNESS
[de-identified] : Patient s/p IG pansinus balloon with total ethmoid with microdebrider and right parisa 02/27/24. Doing well, states feeling stuffy and has some pressure. Doing saline washes twice a day.   Patient s/p IG pansinus balloon with total ethmoid with microdebrider and right parisa 02/27/24. Nose feels sensitive, possibly from Flonase. States blood clots in washes have been reducing. No ryan bleeding. Doing washes twice a day. Pressure has been improving.   Patient s/p IG pansinus balloon with total ethmoid with microdebrider and right parisa 02/27/24. States entire right side of head has pressure and is stuffy. Initially yesterday had yellowish drainage from R nostril since Monday night which was like water. States today it seems more mucusy but still not feeling well, feels like she has another sinus infection. Taking advil cold and sinus.  [FreeTextEntry1] : Patient s/p IG pansinus balloon with total ethmoid with microdebrider and right parisa 02/27/24. Snoring has been sounding different. Has been getting mucus and blood clots from nose and mouth still. no more pressure

## 2024-04-08 NOTE — PROCEDURE
[FreeTextEntry3] : procedure - bilateral endoscopic nasal debridement verbal consent obtained Bilateral nasal cavities inspected with #0 ridged sinus endoscope. septum appeared midline and turbinates well reduced. bilateral middle meatuses were explored and suction and agitator were used to open ostiums and open any forming scar bands. all sinuses were explored and open at end of procedure

## 2024-04-10 ENCOUNTER — APPOINTMENT (OUTPATIENT)
Dept: DERMATOLOGY | Facility: CLINIC | Age: 64
End: 2024-04-10
Payer: COMMERCIAL

## 2024-04-10 PROCEDURE — 12031 INTMD RPR S/A/T/EXT 2.5 CM/<: CPT

## 2024-04-10 PROCEDURE — 11402 EXC TR-EXT B9+MARG 1.1-2 CM: CPT

## 2024-04-10 PROCEDURE — 99213 OFFICE O/P EST LOW 20 MIN: CPT | Mod: 25

## 2024-04-16 ENCOUNTER — NON-APPOINTMENT (OUTPATIENT)
Age: 64
End: 2024-04-16

## 2024-04-23 LAB — DERMATOLOGY BIOPSY: NORMAL

## 2024-05-18 ENCOUNTER — APPOINTMENT (OUTPATIENT)
Dept: CT IMAGING | Facility: CLINIC | Age: 64
End: 2024-05-18
Payer: COMMERCIAL

## 2024-05-18 PROCEDURE — 74160 CT ABDOMEN W/CONTRAST: CPT

## 2024-05-22 ENCOUNTER — APPOINTMENT (OUTPATIENT)
Dept: NEUROLOGY | Facility: CLINIC | Age: 64
End: 2024-05-22
Payer: COMMERCIAL

## 2024-05-22 ENCOUNTER — APPOINTMENT (OUTPATIENT)
Dept: DERMATOLOGY | Facility: CLINIC | Age: 64
End: 2024-05-22
Payer: COMMERCIAL

## 2024-05-22 DIAGNOSIS — G40.909 EPILEPSY, UNSPECIFIED, NOT INTRACTABLE, W/OUT STATUS EPILEPTICUS: ICD-10-CM

## 2024-05-22 DIAGNOSIS — C44.92 SQUAMOUS CELL CARCINOMA OF SKIN, UNSPECIFIED: ICD-10-CM

## 2024-05-22 DIAGNOSIS — C44.529 SQUAMOUS CELL CARCINOMA OF SKIN OF OTHER PART OF TRUNK: ICD-10-CM

## 2024-05-22 DIAGNOSIS — M48.02 SPINAL STENOSIS, CERVICAL REGION: ICD-10-CM

## 2024-05-22 DIAGNOSIS — M54.50 LOW BACK PAIN, UNSPECIFIED: ICD-10-CM

## 2024-05-22 PROCEDURE — 13101 CMPLX RPR TRUNK 2.6-7.5 CM: CPT

## 2024-05-22 PROCEDURE — G2211 COMPLEX E/M VISIT ADD ON: CPT

## 2024-05-22 PROCEDURE — 99213 OFFICE O/P EST LOW 20 MIN: CPT

## 2024-05-22 PROCEDURE — 11603 EXC TR-EXT MAL+MARG 2.1-3 CM: CPT

## 2024-05-22 RX ORDER — LAMOTRIGINE 150 MG/1
150 TABLET ORAL
Qty: 90 | Refills: 3 | Status: ACTIVE | COMMUNITY
Start: 2018-07-26 | End: 1900-01-01

## 2024-05-22 RX ORDER — LAMOTRIGINE 100 MG/1
100 TABLET ORAL
Qty: 90 | Refills: 3 | Status: ACTIVE | COMMUNITY
Start: 2018-07-26 | End: 1900-01-01

## 2024-05-22 NOTE — ASSESSMENT
[FreeTextEntry1] : excision as above  AK -treated with cautery at setting of 2.5; SED including burning, scarring, and incomplete tx. patient verbalized understanding  f/u 2 weeks s/r and skin check

## 2024-05-22 NOTE — PROCEDURE
[___ mm] : [unfilled] mm [___ cm] : [unfilled] cm [___ ml of 1% lidocaine w/ 1:100,000 epinephrine] : [unfilled] ml of 1% lidocaine with 1:100,000 epinephrine [4-0] : 4-0 Prolene [____ cm] : [unfilled] cm [____ days] : [unfilled] days [FreeTextEntry1] : [place] [FreeTextEntry7] : left torso [TWNoteComboBox3] : Subcutaneous fat [TWNoteComboBox5] : Subcutaneous fat

## 2024-05-22 NOTE — DISCUSSION/SUMMARY
[Well-controlled] : well-controlled [Secondary Generalization] : secondary generalization [Idiopathic] : idiopathic  [Focal] : focal [Safety Recommendations] : The patient was advised in regards to the risk of seizures and general seizure safety recommendations including not to be bathing alone, climbing to high places and operating heavy machinery. [Medication Side Effects] : High frequency and serious potential medication adverse effects were reviewed with the patient, including but not exclusive to psychiatric effects.  Information sheets on medication side effects were made available to the patient in our clinic.  The patient or advocate agrees to notify us for any concerns. [Sleep Hygiene/Sleep Disruption Risks] : Sleep hygiene and the risks of sleep disruption were discussed. 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     H/o syncope, s/p PPM. Multilevel disc disease.  s/p 2019 surgery for cervical myelopathy/stenosis, max at C4-C5.  some hand numbness intermittently on left pre-op, improved.  Improved dysesthesia of the ear (numbness in the lower half of the ear/lobe, and behind mandible in neck in C2/3 distribution to pinprick), may have been of high superficial cervical plexus etiology (C2-C3 greater auricular n.).  L5-S1 likely causative of left buttock pain, sciatica, and foot dysesthesias.  Less of late  EMG  confirmed that she has carpal tunnel syndrome bilaterally worse on the right whereas ulnar nerve study was not confirmatory of ulnar entrapment syndrome and needle electromyography was completely normal in all C5-C6 innervated muscles including abductor pollicis brevis and first dorsal interosseous, flexor carpi radialis and flexor carpi ulnaris muscle  Cont /d tingling/numbness pain better of late.  pt defers Rx for now f/u ortho/neuro spine for other options if pain intractable. light exercise caution to avoid falls, injury to head/neck  x time 25min

## 2024-05-22 NOTE — DATA REVIEWED
[de-identified] : 4/2015 CTs have been normal of head, and she cannot have MRI and did not have one prior to the pacemaker.  \par  2016 CT moderate canal stenosis, cord flattening.\par  2019 CT C4-5 central left disc with compression. C5-6, C6-7 root issues.  T11-12 right disc with root compression, L5-S1 disc and root compression. [de-identified] :  EEG showed left temporal spikes in past [de-identified] : Lamictal level was 6.3 in July 2013 on 300 mg per day but dose decreased to 250 for the day due to side effects.  Previous level was 4.1 on 200 mg per day.  LTG 5.2 2016  EMG 2023: Electrodiagnostic studies confirmed that she has carpal tunnel syndrome bilaterally worse on the right whereas ulnar nerve study was not confirmatory of ulnar entrapment syndrome and needle electromyography was completely normal in all C5-C6 innervated muscles including abductor pollicis brevis and first dorsal interosseous, flexor carpi radialis and flexor carpi ulnaris muscle

## 2024-05-22 NOTE — HISTORY OF PRESENT ILLNESS
[FreeTextEntry1] : /100mg, Atenolol  Updates:  no seizures for many years.  no szs, no ADRs on /150mg low dose. some exercise pain better of late  Recent bronchitis. P steroids, inhalers.  Spurs at the left C5-6 foramen which correlates with  her symptoms of tingling numbness and some shooting pain in the forearm and hand 1st 3 digitis. getting inj to neck.  s/p cervical neck surgery feb 2019. had neck pain, tension her neck, shoulder blades with head turn still.   no perceived loss of strength in hands or biceps.  no recent radiation of pain, numbness to left ear, throat area, no spasms of face.  numbness of feet better since surgery.   Less pain in left hip/buttock pain from sleep  Denies migraines in past, occ milder HA with muscle tension in back of head/occiput.  using NSAIDs prn now. no sinus issues. has done accupunture.  On ROS: +/-sleeping OK,  and she has not intentionally lost weight.  Mood is good.  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  She had asystole documented in 2008 and had pacemaker placed at that time, with prior h/o afib.  Describes prior spells of flushing sensation from head to toe with fatigue a few yrs ago. She has had no seizures in 5 years, and is driving now.  She is working and is .   She thinks that she has had about three convulsions in her lifetime and cannot count the partial ones, but has not had one in many years.    Father had seizures after head trauma, and there are no other risk factors.    Patient has a pacemaker but she is now able to have conditional MRI's.

## 2024-05-22 NOTE — HISTORY OF PRESENT ILLNESS
[FreeTextEntry1] : f/u SCC [de-identified] : 63 year old. f/u SCC left torso. growth nasal bridge.

## 2024-05-22 NOTE — REVIEW OF SYSTEMS
[As Noted in HPI] : as noted in HPI [Arthralgias] : arthralgias [Negative] : Heme/Lymph [de-identified] : She is complaining of loss of taste without loss of smell.   [FreeTextEntry9] : right arm pain

## 2024-05-22 NOTE — PHYSICAL EXAM
[General Appearance - Alert] : alert [General Appearance - In No Acute Distress] : in no acute distress [Oriented To Time, Place, And Person] : oriented to person, place, and time [Impaired Insight] : insight and judgment were intact [Affect] : the affect was normal [Person] : oriented to person [Place] : oriented to place [Time] : oriented to time [Short Term Intact] : short term memory intact [Remote Intact] : remote memory intact [Registration Intact] : recent registration memory intact [Span Intact] : the attention span was normal [Concentration Intact] : normal concentrating ability [Visual Intact] : visual attention was ~T not ~L decreased [Naming Objects] : no difficulty naming common objects [Repeating Phrases] : no difficulty repeating a phrase [Writing A Sentence] : no difficulty writing a sentence [Fluency] : fluency intact [Comprehension] : comprehension intact [Reading] : reading intact [Current Events] : adequate knowledge of current events [Past History] : adequate knowledge of personal past history [Vocabulary] : adequate range of vocabulary [Cranial Nerves Optic (II)] : visual acuity intact bilaterally,  visual fields full to confrontation, pupils equal round and reactive to light [Cranial Nerves Oculomotor (III)] : extraocular motion intact [Cranial Nerves Trigeminal (V)] : facial sensation intact symmetrically [Cranial Nerves Facial (VII)] : face symmetrical [Cranial Nerves Glossopharyngeal (IX)] : tongue and palate midline [Cranial Nerves Accessory (XI - Cranial And Spinal)] : head turning and shoulder shrug symmetric [Cranial Nerves Hypoglossal (XII)] : there was no tongue deviation with protrusion [Motor Tone] : muscle tone was normal in all four extremities [Motor Strength] : muscle strength was normal in all four extremities [No Muscle Atrophy] : normal bulk in all four extremities [Motor Handedness Right-Handed] : the patient is right hand dominant [Sensation Tactile Decrease] : light touch was intact [Sensation Pain / Temperature Decrease] : pain and temperature was intact [Abnormal Walk] : normal gait [Balance] : balance was intact [Sclera] : the sclera and conjunctiva were normal [PERRL With Normal Accommodation] : pupils were equal in size, round, reactive to light, with normal accommodation [Extraocular Movements] : extraocular movements were intact [Outer Ear] : the ears and nose were normal in appearance [Oropharynx] : the oropharynx was normal [Neck Appearance] : the appearance of the neck was normal [Neck Cervical Mass (___cm)] : no neck mass was observed [Jugular Venous Distention Increased] : there was no jugular-venous distention [Thyroid Diffuse Enlargement] : the thyroid was not enlarged [Thyroid Nodule] : there were no palpable thyroid nodules [Auscultation Breath Sounds / Voice Sounds] : lungs were clear to auscultation bilaterally [Heart Rate And Rhythm] : heart rate was normal and rhythm regular [Heart Sounds] : normal S1 and S2 [Heart Sounds Gallop] : no gallops [Murmurs] : no murmurs [Heart Sounds Pericardial Friction Rub] : no pericardial rub [Bowel Sounds] : normal bowel sounds [Nail Clubbing] : no clubbing  or cyanosis of the fingernails [] : no rash [Skin Lesions] : no skin lesions [Past-pointing] : there was no past-pointing [Tremor] : no tremor present [3+] : Ankle jerk left 3+ [Plantar Reflex Right Only] : normal on the right [Plantar Reflex Left Only] : normal on the left [___] : absent on the right [___] : absent on the left [FreeTextEntry5] : hearing intact bilat.   [FreeTextEntry6] : mild ABP weakness, and atrophy of thenar eminence

## 2024-05-24 ENCOUNTER — APPOINTMENT (OUTPATIENT)
Dept: DERMATOLOGY | Facility: CLINIC | Age: 64
End: 2024-05-24
Payer: COMMERCIAL

## 2024-05-24 DIAGNOSIS — L90.5 SCAR CONDITIONS AND FIBROSIS OF SKIN: ICD-10-CM

## 2024-05-24 PROCEDURE — 99213 OFFICE O/P EST LOW 20 MIN: CPT

## 2024-05-24 NOTE — PHYSICAL EXAM
[FreeTextEntry3] : mid left torso with small 4mm atropic scar lower left torso with surgical scar, sutures intact

## 2024-05-24 NOTE — ASSESSMENT
[FreeTextEntry1] : Shave biopsy scar on left mid torso and healing surgical scar with sutures intact on left lower torso  I discussed with Mrs Garcia, that I am unable to confirm the site of the surgery, as I was not the physician who did the biopsies or the surgery for her superficially invasive SCC  I did review the pathology slides and shared with Mrs Garcia that the lesion was small but was touching the base of the sections  I assured her that we can expedite the read of her surgical specimen in dermatopathology to see if there is any residual carcinoma in the excisional specimen   I did discuss that if there is no residual carcinoma in the specimen, she would have to follow up with Dr Hodgson to discuss the matter further  All questions were answered satisfactorily  Patient will follow up with Dr Hodgson on May 30th  Meng Parker MD Dermatologist and Dermatopathologist

## 2024-05-24 NOTE — HISTORY OF PRESENT ILLNESS
[FreeTextEntry1] : surgery site [de-identified] : Patient is a 62yo female who had biopsies with Dr Hodgson on the left torso in March 2024 (FEP), and on the left torso in April 2024 (superficially invasive SCC). She is here today because she is unsure which biopsy scar was excised for treatment of her SCC this week.

## 2024-05-27 PROBLEM — C25.9 FAMILIAL CARCINOMA OF PANCREAS: Status: ACTIVE | Noted: 2022-11-17

## 2024-05-30 ENCOUNTER — APPOINTMENT (OUTPATIENT)
Dept: DERMATOLOGY | Facility: CLINIC | Age: 64
End: 2024-05-30
Payer: COMMERCIAL

## 2024-05-30 ENCOUNTER — APPOINTMENT (OUTPATIENT)
Dept: SURGICAL ONCOLOGY | Facility: CLINIC | Age: 64
End: 2024-05-30
Payer: COMMERCIAL

## 2024-05-30 DIAGNOSIS — C25.9 MALIGNANT NEOPLASM OF PANCREAS, UNSPECIFIED: ICD-10-CM

## 2024-05-30 LAB — DERMATOLOGY BIOPSY: NORMAL

## 2024-05-30 PROCEDURE — 12031 INTMD RPR S/A/T/EXT 2.5 CM/<: CPT | Mod: 79

## 2024-05-30 PROCEDURE — 99213 OFFICE O/P EST LOW 20 MIN: CPT

## 2024-05-30 PROCEDURE — 11402 EXC TR-EXT B9+MARG 1.1-2 CM: CPT | Mod: 79

## 2024-05-30 NOTE — PROCEDURE
[___ mm] : [unfilled] mm [___ cm] : [unfilled] cm [___ ml of 1% lidocaine w/ 1:100,000 epinephrine] : [unfilled] ml of 1% lidocaine with 1:100,000 epinephrine [Pressure] : pressure [4-0] : 4-0 Prolene [____ cm] : [unfilled] cm [____ days] : [unfilled] days [FreeTextEntry1] : [place] [FreeTextEntry7] : left upper torso [TWNoteComboBox3] : Subcutaneous fat [TWNoteComboBox5] : Subcutaneous fat

## 2024-05-30 NOTE — HISTORY OF PRESENT ILLNESS
[de-identified] : This visit was provided via telehealth using real-time 2-way audio visual technology. The patient, DANA GROSS, was located at home [address] [place] [address] at the time of the visit. This provider was located at the clinic in [place], [place] at the time of the visit. The provider, patient participated in the telehealth encounter.  Verbal consent was given May 30 2024  2:30PM by the patient.   Ms. DANA GROSS is a 63 year old female who presents for a follow-up visit in our pancreas high risk screening clinic via telehealth.   PMH includes obstructive sleep apnea, history of seizures (at age 47 and 49), history of cervical spinal stenosis, and history of syncope at age 44 with documented sinus arrest leading to permanent pacemaker implantation (Guidant insignia dual-chamber)  Her mother unfortunately  of pancreatic cancer (was dx at age 64). She also has a maternal first cousin (male) who has pancreatic cancer.  Additional family cancer history- lung cancer.  Her mother & cousin w/ PDAC history never had genetic testing.    Patient underwent genetic testing in  which was negative for any mutations.  Patient offers no complaints. Denies any GI symptoms, weight loss, or abdominal pain.  Denies any history of DM.     Last A1c 5.5 %  (10/2023) EUS 6/10/21 showing normal pancreas. Prior to that EUS done 3/6/18, also normal.   She presents today after screening CT abdomen 24, which shows no suspicious pancreatic lesion. Pancreatic divisum noted. Pt states she is able to undergo a conditional MRI with her PPM in the future if needed.

## 2024-05-30 NOTE — ASSESSMENT
[FreeTextEntry1] : 63 year old female who presents for a follow-up visit in our pancreas high risk screening clinic.  Mother and maternal first cousin with history of pancreas cancer, both never had genetic testing.    Patient underwent genetic testing in 2022 which was negative for any mutations.   She presents today after screening CT abdomen 5/18/24, which shows no suspicious pancreatic lesion. Pancreatic divisum noted.  Her last screening EUS was in 2021.   Plan for screening EUS in 1 year.   Discussed rationale with patient, including how the EUS and MRI/CT are complimentary to each other and can see different angles of the pancreas.   Patient agrees to plan.    I discussed symptoms that would raise my concern for malignancy including weight loss, jaundice, abdominal/ back pain, and new onset diabetes. Should these develop, she should call immediately.   Today, I personally spent 25 minutes in total time including reviewing imaging and studies, discussing screening options, direct face to face time with the patient, patient education and counseling.

## 2024-05-30 NOTE — PHYSICAL EXAM
[FreeTextEntry1] : General: No acute distress. Well nourished and well kempt. Head: AT/NC Eyes: PERRL. EOMI. Pulmonary: No respiratory distress. Clear to auscultation bilaterally. No wheezes, rales, or rhonchi. No tachypnea and no retractions.  CV: Regular rate and rhythm. Normal S1, S2. No murmurs, rubs, clicks or gallops. No chest wall tenderness. Distal pulses intact. Good capillary refill. ABD: Soft. NT/ND. No rebound, no guarding, no rigidity. No peritoneal signs. No masses.   Extremities: Warm. No edema. 2+ pulses. Neurological: A&O x 4. Psychiatric: Normal affect and mood.

## 2024-05-30 NOTE — ASSESSMENT
[FreeTextEntry1] : excision with margins as above  f/u 1 week for s/r of previous excision and skin check

## 2024-05-30 NOTE — HISTORY OF PRESENT ILLNESS
[FreeTextEntry1] : f/u excision [de-identified] : 63 year old. f/u excision. concerned of SCC in previous bx site of IFP.

## 2024-05-30 NOTE — REASON FOR VISIT
[Follow-Up Visit] : a follow-up visit for [Referred By: ___] : Referred By: [unfilled] [FreeTextEntry2] : pancreas screening

## 2024-06-05 ENCOUNTER — NON-APPOINTMENT (OUTPATIENT)
Age: 64
End: 2024-06-05

## 2024-06-05 ENCOUNTER — APPOINTMENT (OUTPATIENT)
Dept: DERMATOLOGY | Facility: CLINIC | Age: 64
End: 2024-06-05
Payer: COMMERCIAL

## 2024-06-05 DIAGNOSIS — L70.0 ACNE VULGARIS: ICD-10-CM

## 2024-06-05 DIAGNOSIS — L81.4 OTHER MELANIN HYPERPIGMENTATION: ICD-10-CM

## 2024-06-05 DIAGNOSIS — Z12.83 ENCOUNTER FOR SCREENING FOR MALIGNANT NEOPLASM OF SKIN: ICD-10-CM

## 2024-06-05 DIAGNOSIS — D22.9 MELANOCYTIC NEVI, UNSPECIFIED: ICD-10-CM

## 2024-06-05 PROCEDURE — 99214 OFFICE O/P EST MOD 30 MIN: CPT | Mod: 24,25

## 2024-06-05 PROCEDURE — 17110 DESTRUCTION B9 LES UP TO 14: CPT | Mod: 79

## 2024-06-05 RX ORDER — TRETINOIN 0.25 MG/G
0.03 CREAM TOPICAL
Qty: 1 | Refills: 2 | Status: ACTIVE | COMMUNITY
Start: 2024-06-05 | End: 1900-01-01

## 2024-06-05 NOTE — ASSESSMENT
[FreeTextEntry1] : sutures removed from initial excision  healing well  will remove other sutures next week  1) skin check- -benign findings as above  2) ISK -treated with cautery at setting of 2.5; SED including burning, scarring, and incomplete tx. patient verbalized understanding  3) milia/acne tretinoin 0.025% cream at bedtime; sED

## 2024-06-05 NOTE — HISTORY OF PRESENT ILLNESS
[FreeTextEntry1] : skin check, s/r [de-identified] : 63 year old female. hx of recent NMSC s/p excision. results of excision from last week pending.  itchy spots on forehead.

## 2024-06-05 NOTE — PHYSICAL EXAM
[FreeTextEntry3] : AAOx3, pleasant, NAD, no visual lymphadenopathy hair, scalp, face, nose, eyelids, ears, lips, oropharynx, neck, chest, abdomen, back, right arm, left arm, nails, and hands examined with all normal findings, pertinent findings include:  multiple benign nevi and lentigines numerous Scaling waxy stuck on papules + inflamed, waxy, keratotic papules on forehead well healing excision

## 2024-06-06 LAB — DERMATOLOGY BIOPSY: NORMAL

## 2024-06-12 ENCOUNTER — APPOINTMENT (OUTPATIENT)
Dept: DERMATOLOGY | Facility: CLINIC | Age: 64
End: 2024-06-12
Payer: COMMERCIAL

## 2024-06-12 DIAGNOSIS — L82.0 INFLAMED SEBORRHEIC KERATOSIS: ICD-10-CM

## 2024-06-12 DIAGNOSIS — D48.5 NEOPLASM OF UNCERTAIN BEHAVIOR OF SKIN: ICD-10-CM

## 2024-06-12 DIAGNOSIS — L90.5 SCAR CONDITIONS AND FIBROSIS OF SKIN: ICD-10-CM

## 2024-06-12 PROCEDURE — 99024 POSTOP FOLLOW-UP VISIT: CPT

## 2024-06-12 PROCEDURE — 17110 DESTRUCTION B9 LES UP TO 14: CPT | Mod: 79

## 2024-06-12 NOTE — PHYSICAL EXAM
[FreeTextEntry3] : well healing excision + inflamed, waxy, keratotic papule on right neck back with excoriated plaque

## 2024-06-12 NOTE — HISTORY OF PRESENT ILLNESS
[FreeTextEntry1] : s/r [de-identified] : 63 year old. suture removal. growth on neck. painful, red. spot on back she excoriated off.

## 2024-06-12 NOTE — ASSESSMENT
[FreeTextEntry1] : sutures removed results discussed healing well   ISK -treated with cautery at setting of 2.5; SED including burning, scarring, and incomplete tx. patient verbalized understanding  back xerosis excoriated SK monitor  f./u 6 months

## 2024-07-02 ENCOUNTER — NON-APPOINTMENT (OUTPATIENT)
Age: 64
End: 2024-07-02

## 2024-07-02 ENCOUNTER — APPOINTMENT (OUTPATIENT)
Dept: CARDIOLOGY | Facility: CLINIC | Age: 64
End: 2024-07-02
Payer: COMMERCIAL

## 2024-07-02 ENCOUNTER — APPOINTMENT (OUTPATIENT)
Dept: ELECTROPHYSIOLOGY | Facility: CLINIC | Age: 64
End: 2024-07-02
Payer: COMMERCIAL

## 2024-07-02 VITALS
SYSTOLIC BLOOD PRESSURE: 119 MMHG | WEIGHT: 158 LBS | HEART RATE: 65 BPM | DIASTOLIC BLOOD PRESSURE: 76 MMHG | OXYGEN SATURATION: 97 % | HEIGHT: 65 IN | BODY MASS INDEX: 26.33 KG/M2

## 2024-07-02 DIAGNOSIS — Z95.0 PRESENCE OF CARDIAC PACEMAKER: ICD-10-CM

## 2024-07-02 DIAGNOSIS — R55 SYNCOPE AND COLLAPSE: ICD-10-CM

## 2024-07-02 DIAGNOSIS — M54.12 RADICULOPATHY, CERVICAL REGION: ICD-10-CM

## 2024-07-02 PROCEDURE — 99214 OFFICE O/P EST MOD 30 MIN: CPT

## 2024-07-02 PROCEDURE — 93000 ELECTROCARDIOGRAM COMPLETE: CPT

## 2024-07-02 PROCEDURE — 93280 PM DEVICE PROGR EVAL DUAL: CPT

## 2024-07-06 ENCOUNTER — EMERGENCY (EMERGENCY)
Facility: HOSPITAL | Age: 64
LOS: 1 days | Discharge: HOME | End: 2024-07-06
Attending: EMERGENCY MEDICINE
Payer: COMMERCIAL

## 2024-07-06 VITALS
OXYGEN SATURATION: 96 % | SYSTOLIC BLOOD PRESSURE: 160 MMHG | HEIGHT: 65 IN | RESPIRATION RATE: 16 BRPM | HEART RATE: 62 BPM | TEMPERATURE: 98 F | WEIGHT: 160.06 LBS | DIASTOLIC BLOOD PRESSURE: 89 MMHG

## 2024-07-06 VITALS
TEMPERATURE: 98 F | OXYGEN SATURATION: 96 % | SYSTOLIC BLOOD PRESSURE: 106 MMHG | RESPIRATION RATE: 16 BRPM | HEART RATE: 63 BPM | DIASTOLIC BLOOD PRESSURE: 71 MMHG

## 2024-07-06 DIAGNOSIS — Z98.890 OTHER SPECIFIED POSTPROCEDURAL STATES: Chronic | ICD-10-CM

## 2024-07-06 DIAGNOSIS — Z98.1 ARTHRODESIS STATUS: Chronic | ICD-10-CM

## 2024-07-06 LAB
ALBUMIN SERPL ELPH-MCNC: 4.1 G/DL — SIGNIFICANT CHANGE UP (ref 3.3–5)
ALP SERPL-CCNC: 90 U/L — SIGNIFICANT CHANGE UP (ref 40–120)
ALT FLD-CCNC: 12 U/L — SIGNIFICANT CHANGE UP (ref 10–45)
ANION GAP SERPL CALC-SCNC: 10 MMOL/L — SIGNIFICANT CHANGE UP (ref 5–17)
AST SERPL-CCNC: 17 U/L — SIGNIFICANT CHANGE UP (ref 10–40)
BASOPHILS # BLD AUTO: 0.03 K/UL — SIGNIFICANT CHANGE UP (ref 0–0.2)
BASOPHILS NFR BLD AUTO: 0.7 % — SIGNIFICANT CHANGE UP (ref 0–2)
BILIRUB SERPL-MCNC: 0.2 MG/DL — SIGNIFICANT CHANGE UP (ref 0.2–1.2)
BUN SERPL-MCNC: 12 MG/DL — SIGNIFICANT CHANGE UP (ref 7–23)
CALCIUM SERPL-MCNC: 9.2 MG/DL — SIGNIFICANT CHANGE UP (ref 8.4–10.5)
CHLORIDE SERPL-SCNC: 106 MMOL/L — SIGNIFICANT CHANGE UP (ref 96–108)
CO2 SERPL-SCNC: 24 MMOL/L — SIGNIFICANT CHANGE UP (ref 22–31)
CREAT SERPL-MCNC: 0.82 MG/DL — SIGNIFICANT CHANGE UP (ref 0.5–1.3)
D DIMER BLD IA.RAPID-MCNC: 201 NG/ML DDU — SIGNIFICANT CHANGE UP
EGFR: 81 ML/MIN/1.73M2 — SIGNIFICANT CHANGE UP
EOSINOPHIL # BLD AUTO: 0.19 K/UL — SIGNIFICANT CHANGE UP (ref 0–0.5)
EOSINOPHIL NFR BLD AUTO: 4.2 % — SIGNIFICANT CHANGE UP (ref 0–6)
GLUCOSE SERPL-MCNC: 92 MG/DL — SIGNIFICANT CHANGE UP (ref 70–99)
HCT VFR BLD CALC: 37.1 % — SIGNIFICANT CHANGE UP (ref 34.5–45)
HGB BLD-MCNC: 11.8 G/DL — SIGNIFICANT CHANGE UP (ref 11.5–15.5)
IMM GRANULOCYTES NFR BLD AUTO: 1.3 % — HIGH (ref 0–0.9)
LYMPHOCYTES # BLD AUTO: 1.29 K/UL — SIGNIFICANT CHANGE UP (ref 1–3.3)
LYMPHOCYTES # BLD AUTO: 28.7 % — SIGNIFICANT CHANGE UP (ref 13–44)
MCHC RBC-ENTMCNC: 29.9 PG — SIGNIFICANT CHANGE UP (ref 27–34)
MCHC RBC-ENTMCNC: 31.8 GM/DL — LOW (ref 32–36)
MCV RBC AUTO: 94.2 FL — SIGNIFICANT CHANGE UP (ref 80–100)
MONOCYTES # BLD AUTO: 0.36 K/UL — SIGNIFICANT CHANGE UP (ref 0–0.9)
MONOCYTES NFR BLD AUTO: 8 % — SIGNIFICANT CHANGE UP (ref 2–14)
NEUTROPHILS # BLD AUTO: 2.57 K/UL — SIGNIFICANT CHANGE UP (ref 1.8–7.4)
NEUTROPHILS NFR BLD AUTO: 57.1 % — SIGNIFICANT CHANGE UP (ref 43–77)
NRBC # BLD: 0 /100 WBCS — SIGNIFICANT CHANGE UP (ref 0–0)
PLATELET # BLD AUTO: 222 K/UL — SIGNIFICANT CHANGE UP (ref 150–400)
POTASSIUM SERPL-MCNC: 4.5 MMOL/L — SIGNIFICANT CHANGE UP (ref 3.5–5.3)
POTASSIUM SERPL-SCNC: 4.5 MMOL/L — SIGNIFICANT CHANGE UP (ref 3.5–5.3)
PROT SERPL-MCNC: 6.4 G/DL — SIGNIFICANT CHANGE UP (ref 6–8.3)
RBC # BLD: 3.94 M/UL — SIGNIFICANT CHANGE UP (ref 3.8–5.2)
RBC # FLD: 12.3 % — SIGNIFICANT CHANGE UP (ref 10.3–14.5)
SODIUM SERPL-SCNC: 140 MMOL/L — SIGNIFICANT CHANGE UP (ref 135–145)
WBC # BLD: 4.5 K/UL — SIGNIFICANT CHANGE UP (ref 3.8–10.5)
WBC # FLD AUTO: 4.5 K/UL — SIGNIFICANT CHANGE UP (ref 3.8–10.5)

## 2024-07-06 PROCEDURE — 71046 X-RAY EXAM CHEST 2 VIEWS: CPT | Mod: 26

## 2024-07-06 PROCEDURE — 71046 X-RAY EXAM CHEST 2 VIEWS: CPT

## 2024-07-06 PROCEDURE — 96374 THER/PROPH/DIAG INJ IV PUSH: CPT

## 2024-07-06 PROCEDURE — 85379 FIBRIN DEGRADATION QUANT: CPT

## 2024-07-06 PROCEDURE — 80053 COMPREHEN METABOLIC PANEL: CPT

## 2024-07-06 PROCEDURE — 99285 EMERGENCY DEPT VISIT HI MDM: CPT | Mod: 25

## 2024-07-06 PROCEDURE — 99053 MED SERV 10PM-8AM 24 HR FAC: CPT

## 2024-07-06 PROCEDURE — 99285 EMERGENCY DEPT VISIT HI MDM: CPT

## 2024-07-06 PROCEDURE — 93005 ELECTROCARDIOGRAM TRACING: CPT

## 2024-07-06 PROCEDURE — 85025 COMPLETE CBC W/AUTO DIFF WBC: CPT

## 2024-07-06 RX ORDER — LIDOCAINE HCL 28 MG/G
1 GEL TOPICAL ONCE
Refills: 0 | Status: COMPLETED | OUTPATIENT
Start: 2024-07-06 | End: 2024-07-06

## 2024-07-06 RX ORDER — ACETAMINOPHEN 325 MG
650 TABLET ORAL ONCE
Refills: 0 | Status: COMPLETED | OUTPATIENT
Start: 2024-07-06 | End: 2024-07-06

## 2024-07-06 RX ORDER — KETOROLAC TROMETHAMINE 30 MG/ML
15 INJECTION, SOLUTION INTRAMUSCULAR ONCE
Refills: 0 | Status: DISCONTINUED | OUTPATIENT
Start: 2024-07-06 | End: 2024-07-06

## 2024-07-06 RX ORDER — DIAZEPAM 10 MG/1
5 TABLET ORAL ONCE
Refills: 0 | Status: DISCONTINUED | OUTPATIENT
Start: 2024-07-06 | End: 2024-07-06

## 2024-07-06 RX ADMIN — DIAZEPAM 5 MILLIGRAM(S): 10 TABLET ORAL at 11:01

## 2024-07-06 RX ADMIN — KETOROLAC TROMETHAMINE 15 MILLIGRAM(S): 30 INJECTION, SOLUTION INTRAMUSCULAR at 08:46

## 2024-07-06 RX ADMIN — LIDOCAINE HCL 1 PATCH: 28 GEL TOPICAL at 08:46

## 2024-07-06 RX ADMIN — Medication 650 MILLIGRAM(S): at 08:46

## 2024-07-18 ENCOUNTER — APPOINTMENT (OUTPATIENT)
Dept: OTOLARYNGOLOGY | Facility: CLINIC | Age: 64
End: 2024-07-18

## 2024-09-03 DIAGNOSIS — Z00.00 ENCOUNTER FOR GENERAL ADULT MEDICAL EXAMINATION W/OUT ABNORMAL FINDINGS: ICD-10-CM

## 2024-09-03 DIAGNOSIS — E55.9 VITAMIN D DEFICIENCY, UNSPECIFIED: ICD-10-CM

## 2024-09-23 ENCOUNTER — APPOINTMENT (OUTPATIENT)
Dept: NEUROLOGY | Facility: CLINIC | Age: 64
End: 2024-09-23

## 2024-09-24 NOTE — ASU PREOP CHECKLIST - SITE MARKED BY ANESTHESIOLOGIST
----- Message from Ania Carrasco NP sent at 9/23/2024  9:25 AM CDT -----  Please send to [facility]:     Compounded prometrium 20%, 2 clicks to inner thigh nightly  Disp: 30 g, refill: 2    KR   n/a

## 2024-10-25 ENCOUNTER — APPOINTMENT (OUTPATIENT)
Dept: INTERNAL MEDICINE | Facility: CLINIC | Age: 64
End: 2024-10-25
Payer: COMMERCIAL

## 2024-10-25 VITALS
BODY MASS INDEX: 27.82 KG/M2 | OXYGEN SATURATION: 98 % | HEIGHT: 65 IN | WEIGHT: 167 LBS | TEMPERATURE: 98.4 F | HEART RATE: 66 BPM | SYSTOLIC BLOOD PRESSURE: 100 MMHG | DIASTOLIC BLOOD PRESSURE: 67 MMHG

## 2024-10-25 DIAGNOSIS — Z82.3 FAMILY HISTORY OF STROKE: ICD-10-CM

## 2024-10-25 DIAGNOSIS — Z85.828 PERSONAL HISTORY OF OTHER MALIGNANT NEOPLASM OF SKIN: ICD-10-CM

## 2024-10-25 DIAGNOSIS — Z00.00 ENCOUNTER FOR GENERAL ADULT MEDICAL EXAMINATION W/OUT ABNORMAL FINDINGS: ICD-10-CM

## 2024-10-25 DIAGNOSIS — C44.92 SQUAMOUS CELL CARCINOMA OF SKIN, UNSPECIFIED: ICD-10-CM

## 2024-10-25 DIAGNOSIS — M79.606 PAIN IN LEG, UNSPECIFIED: ICD-10-CM

## 2024-10-25 DIAGNOSIS — E55.9 VITAMIN D DEFICIENCY, UNSPECIFIED: ICD-10-CM

## 2024-10-25 PROCEDURE — 99396 PREV VISIT EST AGE 40-64: CPT

## 2024-11-15 ENCOUNTER — APPOINTMENT (OUTPATIENT)
Dept: OTOLARYNGOLOGY | Facility: CLINIC | Age: 64
End: 2024-11-15
Payer: COMMERCIAL

## 2024-11-15 VITALS — HEIGHT: 65 IN | WEIGHT: 167 LBS | BODY MASS INDEX: 27.82 KG/M2

## 2024-11-15 DIAGNOSIS — R05.9 COUGH, UNSPECIFIED: ICD-10-CM

## 2024-11-15 DIAGNOSIS — J34.89 OTHER SPECIFIED DISORDERS OF NOSE AND NASAL SINUSES: ICD-10-CM

## 2024-11-15 DIAGNOSIS — J32.4 CHRONIC PANSINUSITIS: ICD-10-CM

## 2024-11-15 PROCEDURE — 31237 NSL/SINS NDSC SURG BX POLYPC: CPT | Mod: RT

## 2024-11-15 PROCEDURE — 99214 OFFICE O/P EST MOD 30 MIN: CPT | Mod: 25

## 2024-11-15 RX ORDER — AMOXICILLIN AND CLAVULANATE POTASSIUM 875; 125 MG/1; MG/1
875-125 TABLET, COATED ORAL
Qty: 28 | Refills: 0 | Status: ACTIVE | COMMUNITY
Start: 2024-11-15 | End: 1900-01-01

## 2024-11-15 RX ORDER — MUPIROCIN 20 MG/G
2 OINTMENT TOPICAL
Qty: 1 | Refills: 0 | Status: ACTIVE | COMMUNITY
Start: 2024-11-15 | End: 1900-01-01

## 2024-11-15 RX ORDER — METHYLPREDNISOLONE 4 MG/1
4 TABLET ORAL
Qty: 1 | Refills: 0 | Status: ACTIVE | COMMUNITY
Start: 2024-11-15 | End: 1900-01-01

## 2024-12-03 ENCOUNTER — APPOINTMENT (OUTPATIENT)
Dept: ELECTROPHYSIOLOGY | Facility: CLINIC | Age: 64
End: 2024-12-03

## 2024-12-13 ENCOUNTER — APPOINTMENT (OUTPATIENT)
Dept: DERMATOLOGY | Facility: CLINIC | Age: 64
End: 2024-12-13
Payer: COMMERCIAL

## 2024-12-13 DIAGNOSIS — D22.9 MELANOCYTIC NEVI, UNSPECIFIED: ICD-10-CM

## 2024-12-13 DIAGNOSIS — Z12.83 ENCOUNTER FOR SCREENING FOR MALIGNANT NEOPLASM OF SKIN: ICD-10-CM

## 2024-12-13 DIAGNOSIS — D48.5 NEOPLASM OF UNCERTAIN BEHAVIOR OF SKIN: ICD-10-CM

## 2024-12-13 DIAGNOSIS — L85.3 XEROSIS CUTIS: ICD-10-CM

## 2024-12-13 DIAGNOSIS — L82.1 OTHER SEBORRHEIC KERATOSIS: ICD-10-CM

## 2024-12-13 PROCEDURE — 99213 OFFICE O/P EST LOW 20 MIN: CPT | Mod: 25

## 2024-12-13 PROCEDURE — 11102 TANGNTL BX SKIN SINGLE LES: CPT

## 2024-12-17 NOTE — H&P PST ADULT - NSHPATTENDINGPLANDISCUSS_GEN_ALL_CORE
Pharmacy Medication History Review    Michael Reed is a 56 y.o. male admitted for Stroke due to embolism of left anterior cerebral artery (Multi). Pharmacy reviewed the patient's oroqe-lz-oglzidomj medications and allergies for accuracy.    Medications ADDED:  None   Medications CHANGED:  None   Medications REMOVED:   None      The list below reflects the updated PTA list.   Prior to Admission Medications   Prescriptions Last Dose Informant   Breo Ellipta 200-25 mcg/dose inhaler  Other   Sig: Inhale 1 puff once daily. Last fill 8/22/24.    PARoxetine (Paxil) 20 mg tablet  Other   Sig: Take 1 tablet (20 mg) by mouth once daily. Last fill 8/22/24.    Xarelto 20 mg tablet  Other   Sig: Take 1 tablet (20 mg) by mouth once daily in the evening. Take with meals. Last fill 10/08/24 for 30 day supply.    albuterol 90 mcg/actuation inhaler  Other   Sig: Inhale 2 puffs every 4 hours if needed for wheezing or shortness of breath.Last fill 8/22/24.    amLODIPine (Norvasc) 10 mg tablet  Other   Sig: Take 1 tablet (10 mg) by mouth once daily.Last fill 8/22/24.    aspirin 81 mg chewable tablet  Other   Sig: Chew 1 tablet (81 mg) early in the morning.. Last fill 01/25/24 for 90 day supply.    atorvastatin (Lipitor) 40 mg tablet  Other   Sig: Take 1 tablet (40 mg) by mouth once daily.Last fill 8/22/24.    carvedilol (Coreg) 3.125 mg tablet  Other   Sig: Take 1 tablet (3.125 mg) by mouth 2 times a day.Last fill 8/22/24.    gabapentin (Neurontin) 300 mg capsule  Other   Sig: Take 1 capsule (300 mg) by mouth 3 times a day.Last fill 8/22/24.    lisinopril 40 mg tablet  Other   Sig: Take 1 tablet (40 mg) by mouth once daily.Last fill 8/22/24.       Facility-Administered Medications: None        The list below reflects the updated allergy list. Please review each documented allergy for additional clarification and justification.  Allergies  Indicated as Unable to Assess by Andie Ayala on 12/17/2024 (Patient unable to communicate)  "  No Known Allergies         Patient was unable to be assessed for M2B at discharge.     Sources:   [facility]  Pharmacy dispense history  Patient interview Unable to provide any details  Chart Review  Care Everywhere     Additional Comments:  Patient was unable to speak during time of MedRec. Speech Therapist was in the room with the patient to verify that patient was unable to talk. Both of the patients emergency contacts were unable to be reached. The number listed for spouse Tiffany was disconnected.  Patient MedRec was completed using best known information from Chart review and prescription fill history.  Most of patients prescription was last filled 8/22/24 for 90 day supply.      Andie Ayala  Pharmacy Technician  12/17/24     Secure Chat preferred   If no response call [phone] or [employer] \"Med Rec\"   " patient

## 2024-12-19 LAB — DERMATOLOGY BIOPSY: NORMAL

## 2024-12-20 ENCOUNTER — APPOINTMENT (OUTPATIENT)
Dept: INTERNAL MEDICINE | Facility: CLINIC | Age: 64
End: 2024-12-20
Payer: COMMERCIAL

## 2024-12-20 VITALS
HEART RATE: 65 BPM | HEIGHT: 65 IN | SYSTOLIC BLOOD PRESSURE: 130 MMHG | TEMPERATURE: 98.6 F | WEIGHT: 163 LBS | OXYGEN SATURATION: 98 % | BODY MASS INDEX: 27.16 KG/M2 | DIASTOLIC BLOOD PRESSURE: 76 MMHG

## 2024-12-20 DIAGNOSIS — J98.01 ACUTE BRONCHOSPASM: ICD-10-CM

## 2024-12-20 DIAGNOSIS — R09.82 POSTNASAL DRIP: ICD-10-CM

## 2024-12-20 DIAGNOSIS — R05.9 COUGH, UNSPECIFIED: ICD-10-CM

## 2024-12-20 LAB
FLUAV SPEC QL CULT: NEGATIVE
FLUBV AG SPEC QL IA: NEGATIVE
SARS-COV-2 RDRP RESP QL NAA+PROBE: NEGATIVE

## 2024-12-20 PROCEDURE — 99214 OFFICE O/P EST MOD 30 MIN: CPT

## 2024-12-20 PROCEDURE — 87804 INFLUENZA ASSAY W/OPTIC: CPT | Mod: QW

## 2024-12-20 RX ORDER — BENZONATATE 200 MG/1
200 CAPSULE ORAL 3 TIMES DAILY
Qty: 30 | Refills: 0 | Status: ACTIVE | COMMUNITY
Start: 2024-12-20 | End: 1900-01-01

## 2024-12-20 RX ORDER — IPRATROPIUM BROMIDE 42 UG/1
0.06 SPRAY NASAL 3 TIMES DAILY
Qty: 1 | Refills: 2 | Status: ACTIVE | COMMUNITY
Start: 2024-12-20 | End: 1900-01-01

## 2024-12-20 RX ORDER — AMOXICILLIN AND CLAVULANATE POTASSIUM 875; 125 MG/1; MG/1
875-125 TABLET, COATED ORAL
Qty: 14 | Refills: 0 | Status: ACTIVE | COMMUNITY
Start: 2024-12-20 | End: 1900-01-01

## 2024-12-20 RX ORDER — FLUTICASONE PROPIONATE AND SALMETEROL 250; 50 UG/1; UG/1
250-50 POWDER RESPIRATORY (INHALATION)
Qty: 1 | Refills: 0 | Status: ACTIVE | COMMUNITY
Start: 2024-12-20 | End: 1900-01-01

## 2024-12-20 RX ORDER — PREDNISONE 20 MG/1
20 TABLET ORAL
Qty: 10 | Refills: 0 | Status: ACTIVE | COMMUNITY
Start: 2024-12-20 | End: 1900-01-01

## 2024-12-27 ENCOUNTER — NON-APPOINTMENT (OUTPATIENT)
Age: 64
End: 2024-12-27

## 2024-12-27 ENCOUNTER — APPOINTMENT (OUTPATIENT)
Dept: ELECTROPHYSIOLOGY | Facility: CLINIC | Age: 64
End: 2024-12-27
Payer: COMMERCIAL

## 2024-12-27 PROCEDURE — 93280 PM DEVICE PROGR EVAL DUAL: CPT

## 2025-01-08 NOTE — END OF VISIT
LUNG TRANSPLANT RECIPIENT FOLLOW-UP    Reason for Visit: Follow-up after lung transplantation.                                                                                                         Date of Transplant: 3/18/21                                                                               Reason for Transplant: IPF                                                                               Type of Transplant: bilateral sequential lung                                                                               CMV Status: D- / R+     Hepatitis C Status:  Donor Ab:(+)  Donor EFRAIN:(+)  Recipient serostatus:(+)  Treatment status: Completed treatment                                                                              Major Complications:     1. Hemothorax with return to OR  2. Prolonged vent weaning requiring trach/PEG  3. Afib  4. Gastric fistula with partial gastrectomy  5. Expected HCV infection  6. Severe gastroparesis s/p J-tube placement and tube feedings.  S/p removal.    7. Fungemia  8. THUY requiring HD  9. HIT+; thrombocytopenia complicated by beta lactam use with recurrent DVT- on eliquis  10. AMR 11/2021 s/p completion of therapy (MP, PLEX/IVIG, ritux)   11. A2/BR2 12/2022 s/p pulse steroids  12. Refractory ACR s/p ATG 1/6                                                                               History of Present Illness: Igor Sprague is a 58 y.o. year old male who is on 0L of oxygen. He is on no assisted ventilation.  His New York Heart Association Class is I and a Karnofsky score of 100% - Normal, No Complaints, no evidence of disease. He is not diabetic.     Patient presents today for routine follow up. Reports two weeks of malaise, cough with productive sputum, dyspnea, but has improved over the last week. Continues to note ongoing dyspnea and cough worse at night, which he attributes to recent viral illness and inactivity. Underwent fundoplication without  "complications in early August. Denies GI complaints. Reflux very well controlled. Compliant with his medications and tolerating well.     Has been without his everolimus for the last three days.     Review of Systems   Constitutional:  Negative for chills, diaphoresis, fever, malaise/fatigue and weight loss.   HENT:  Negative for congestion, ear discharge, ear pain, hearing loss, nosebleeds, sinus pain, sore throat and tinnitus.    Eyes:  Negative for blurred vision, double vision, photophobia, pain, discharge and redness.   Respiratory:  Positive for shortness of breath (stable). Negative for cough, hemoptysis, sputum production, wheezing and stridor.    Cardiovascular:  Negative for chest pain, palpitations, orthopnea, claudication, leg swelling and PND.   Gastrointestinal:  Negative for abdominal pain, blood in stool, constipation, diarrhea, heartburn, melena, nausea and vomiting.   Genitourinary:  Negative for dysuria, flank pain, frequency, hematuria and urgency.   Musculoskeletal:  Negative for back pain, falls, joint pain, myalgias and neck pain.   Skin:  Negative for itching and rash.   Neurological:  Negative for dizziness, tingling, tremors, sensory change, speech change, focal weakness, seizures, loss of consciousness, weakness and headaches.   Endo/Heme/Allergies:  Negative for environmental allergies and polydipsia. Does not bruise/bleed easily.   Psychiatric/Behavioral:  Negative for depression, hallucinations, memory loss, substance abuse and suicidal ideas. The patient is not nervous/anxious and does not have insomnia.      /81 (BP Location: Right arm, Patient Position: Sitting)   Pulse (!) 113   Temp 98.4 °F (36.9 °C) (Oral)   Resp 16   Ht 5' 8" (1.727 m)   Wt 79.8 kg (176 lb)   SpO2 (!) 94%   BMI 26.76 kg/m²     Physical Exam  Vitals reviewed.   Constitutional:       General: He is not in acute distress.     Appearance: Normal appearance. He is normal weight. He is not ill-appearing, " toxic-appearing or diaphoretic.   HENT:      Head: Normocephalic and atraumatic.      Nose: No congestion.   Eyes:      Extraocular Movements: Extraocular movements intact.      Conjunctiva/sclera: Conjunctivae normal.   Cardiovascular:      Rate and Rhythm: Normal rate and regular rhythm.      Pulses: Normal pulses.      Heart sounds: Normal heart sounds. No murmur heard.     No friction rub. No gallop.   Pulmonary:      Effort: Pulmonary effort is normal. No respiratory distress.      Breath sounds: Normal breath sounds. No stridor. No wheezing, rhonchi or rales.      Comments:     Abdominal:      General: Abdomen is flat.      Palpations: Abdomen is soft.      Comments:     Musculoskeletal:         General: No deformity. Normal range of motion.      Cervical back: Normal range of motion and neck supple.   Skin:     General: Skin is warm and dry.      Coloration: Skin is not jaundiced or pale.   Neurological:      General: No focal deficit present.      Mental Status: He is alert. Mental status is at baseline.   Psychiatric:         Mood and Affect: Mood normal.         Behavior: Behavior normal.         Thought Content: Thought content normal.         Judgment: Judgment normal.       Labs:      Latest Ref Rng & Units 9/18/2024     8:19 AM 10/2/2024     8:17 AM 1/8/2025     7:49 AM   cbc, cmp, tacrolimus   Tacrolimus Lvl (USE PT. THRESHOLDS) 5.0 - 15.0 ng/mL 3.5  3.8     WBC (USE PT. THRESHOLDS) 3.90 - 12.70 K/uL  5.86  5.66    RBC (USE PT. THRESHOLDS) 4.60 - 6.20 M/uL  5.37  5.32    Hemoglobin (USE PT. THRESHOLDS) 14.0 - 18.0 g/dL  14.6  14.7    Hematocrit (USE PT. THRESHOLDS) 40.0 - 54.0 %  44.2  44.4    MCV (USE PT. THRESHOLDS) 82 - 98 fL  82  84    MCH (USE PT. THRESHOLDS) 27.0 - 31.0 pg  27.2  27.6    MCHC (USE PT. THRESHOLDS) 32.0 - 36.0 g/dL  33.0  33.1    RDW (USE PT. THRESHOLDS) 11.5 - 14.5 %  16.4  14.9    Platelet Count (USE PT. THRESHOLDS) 150 - 450 K/uL  163  182    MPV (USE PT. THRESHOLDS) 9.2 - 12.9  fL  10.5  10.7    Gran # (ANC) (USE PT. THRESHOLDS) 1.8 - 7.7 K/uL  3.5  3.3    Lymph # (USE PT. THRESHOLDS) 1.0 - 4.8 K/uL  1.2  1.3    Mono # (USE PT. THRESHOLDS) 0.3 - 1.0 K/uL  0.9  0.8    Eos % (USE PT. THRESHOLDS) 0.0 - 8.0 %  3.4  3.4    Basophil % (USE PT. THRESHOLDS) 0.0 - 1.9 %  0.3  0.2    Differential Method (USE PT. THRESHOLDS)   Automated  Automated    Sodium (USE PT. THRESHOLDS) 136 - 145 mmol/L 139  141  144    Potassium (USE PT. THRESHOLDS) 3.5 - 5.1 mmol/L 4.3  4.1  4.1    Chloride (USE PT. THRESHOLDS) 95 - 110 mmol/L 111  112  111    CO2 (USE PT. THRESHOLDS) 23 - 29 mmol/L 22  21  25    Glucose (USE PT. THRESHOLDS) 70 - 110 mg/dL 96  97  89    BUN (USE PT. THRESHOLDS) 6 - 20 mg/dL 25  34  29    Creatinine (USE PT. THRESHOLDS) 0.5 - 1.4 mg/dL 2.3  2.2  2.3    Calcium (USE PT. THRESHOLDS) 8.7 - 10.5 mg/dL 9.3  9.6  8.9    PROTEIN TOTAL (USE PT. THRESHOLDS) 6.0 - 8.4 g/dL  7.2  6.8    Albumin (USE PT. THRESHOLDS) 3.5 - 5.2 g/dL  3.4  3.2    BILIRUBIN TOTAL (USE PT. THRESHOLDS) 0.1 - 1.0 mg/dL  0.3  0.2    ALP (USE PT. THRESHOLDS) 40 - 150 U/L  81  75    AST (USE PT. THRESHOLDS) 10 - 40 U/L  19  20    ALT (USE PT. THRESHOLDS) 10 - 44 U/L  18  18    Anion Gap (USE PT. THRESHOLDS) 8 - 16 mmol/L 6  8  8          1/8/2025    10:02 AM 11/21/2024     4:50 PM 10/2/2024     8:26 AM 7/24/2024     8:34 AM 6/19/2024     9:46 AM 4/17/2024     9:06 AM 3/13/2024    10:18 AM   Pulmonary Function Tests   FVC 2.08 liters 2.63 liters 2.16 liters 2.02 liters 2.08 liters 2.24 liters 2.17 liters   FEV1 1.26 liters 1.5 liters 1.3 liters 1.18 liters 1.24 liters 1.31 liters 1.31 liters   FVC% 55.4 70 57.4 53.7 55.3 59.4 57.4   FEV1% 42.5 50 43.6 39.7 41.7 44 43.9   FEF 25-75 0.64 0.56 0.64 0.58 0.62 0.64 0.65   FEF 25-75% 17.5 21 17.7 15.8 23.1 24 24.3       Imaging:  Results for orders placed during the hospital encounter of 01/08/25    X-Ray Chest PA And Lateral    Narrative  EXAMINATION:  XR CHEST PA AND  LATERAL    CLINICAL HISTORY:  Lung transplant status    TECHNIQUE:  PA and lateral views of the chest were performed.    COMPARISON:  Multiple prior exams, most recent from 10/02/2024    FINDINGS:  Stable postsurgical changes of the chest.  Cardiac silhouette is not enlarged.  Patchy opacity within the right lower lung zone similar to prior exam.  Scattered coarse interstitial lung markings within the lungs, unchanged.  No pleural effusion.  No pneumothorax.  Surgical clips of the right upper quadrant.  No evidence of acute fracture.    Impression  No acute process.  Stable findings of the chest.      Electronically signed by: Danilo Harris MD  Date:    01/08/2025  Time:    08:59        Assessment:  1. Encounter for aftercare following lung transplant    2. Lung transplant status, bilateral    3. Immunosuppression    4. Prophylactic antibiotic    5. CKD (chronic kidney disease), stage IV    6. Heparin induced thrombocytopenia    7. Antibody mediated rejection of lung transplant    8. Gastroesophageal reflux disease, unspecified whether esophagitis present    9. Hypogammaglobulinemia    10. Coronavirus infection      Plan:     FEV1 stable. Has remained at ~1.3L for the last year. CXR (and CT from October) with fibrotic changes R>Bk the right, consistent with known CLAD/AUBREE. Previously treated for A2 ACR in March 2024. Symptomatically doing well and denies respiratory complaints today.     2. Continue envarsus, everolimus, and prednisone. Adjust dose per trough. Azithromycin for JOSE JUAN/CLAD prophylaxis. Follow-up tac and evero levels and adjust as needed.    3. Continue bactrim SS. Monitor CMV PCR per protocol.     4. Creatinine stable at 2.3 today. Renally dose medications and continue to monitor.         5. Continue apixaban for history of recurrent DVT and history of HIT.     6. Has a hx of AMR s/p treatment. Has lingering positive DSAs. Lifelong IVIG    7. Continue PPI for history of GERD. S/p fundoplication  8/29. Symptoms well controlled.     8. Continue gastroparesis diet.     9. Hypogamm--on monthly IVIG. Continue lifelong.      10. Codeine for cough. Continue post-viral PFTs per protocol.     11. RTC in 3 months or sooner if needed.       Kimi Multani PA-C  Lung Transplant   [>50% of Time Spent on Counseling and Coordination of Care for  ___] : Greater than 50% of the encounter time was spent on counseling and coordination of care for [unfilled]

## 2025-02-07 ENCOUNTER — APPOINTMENT (OUTPATIENT)
Dept: ORTHOPEDIC SURGERY | Facility: CLINIC | Age: 65
End: 2025-02-07
Payer: COMMERCIAL

## 2025-02-07 DIAGNOSIS — M25.511 PAIN IN RIGHT SHOULDER: ICD-10-CM

## 2025-02-07 DIAGNOSIS — M75.41 IMPINGEMENT SYNDROME OF RIGHT SHOULDER: ICD-10-CM

## 2025-02-07 PROCEDURE — 73030 X-RAY EXAM OF SHOULDER: CPT | Mod: RT

## 2025-02-07 PROCEDURE — 99213 OFFICE O/P EST LOW 20 MIN: CPT

## 2025-02-07 RX ORDER — MELOXICAM 15 MG/1
15 TABLET ORAL DAILY
Qty: 30 | Refills: 0 | Status: ACTIVE | COMMUNITY
Start: 2025-02-07 | End: 1900-01-01

## 2025-02-14 ENCOUNTER — OUTPATIENT (OUTPATIENT)
Dept: OUTPATIENT SERVICES | Facility: HOSPITAL | Age: 65
LOS: 1 days | End: 2025-02-14
Payer: COMMERCIAL

## 2025-02-14 DIAGNOSIS — Z98.1 ARTHRODESIS STATUS: Chronic | ICD-10-CM

## 2025-02-14 DIAGNOSIS — Z98.890 OTHER SPECIFIED POSTPROCEDURAL STATES: Chronic | ICD-10-CM

## 2025-02-14 DIAGNOSIS — M75.41 IMPINGEMENT SYNDROME OF RIGHT SHOULDER: ICD-10-CM

## 2025-02-14 PROCEDURE — 71046 X-RAY EXAM CHEST 2 VIEWS: CPT | Mod: 26

## 2025-02-14 PROCEDURE — 73221 MRI JOINT UPR EXTREM W/O DYE: CPT | Mod: 26,RT

## 2025-02-14 PROCEDURE — 73221 MRI JOINT UPR EXTREM W/O DYE: CPT

## 2025-02-14 PROCEDURE — 71046 X-RAY EXAM CHEST 2 VIEWS: CPT

## 2025-02-21 ENCOUNTER — APPOINTMENT (OUTPATIENT)
Dept: ULTRASOUND IMAGING | Facility: CLINIC | Age: 65
End: 2025-02-21
Payer: COMMERCIAL

## 2025-02-21 ENCOUNTER — APPOINTMENT (OUTPATIENT)
Dept: MAMMOGRAPHY | Facility: CLINIC | Age: 65
End: 2025-02-21
Payer: COMMERCIAL

## 2025-02-21 PROCEDURE — 93925 LOWER EXTREMITY STUDY: CPT

## 2025-02-21 PROCEDURE — 77063 BREAST TOMOSYNTHESIS BI: CPT

## 2025-02-21 PROCEDURE — 77067 SCR MAMMO BI INCL CAD: CPT

## 2025-02-21 PROCEDURE — 93970 EXTREMITY STUDY: CPT

## 2025-02-24 ENCOUNTER — TRANSCRIPTION ENCOUNTER (OUTPATIENT)
Age: 65
End: 2025-02-24

## 2025-02-27 NOTE — REVIEW OF SYSTEMS
To get better and follow your care plan as instructed. [Patient Intake Form Reviewed] : Patient intake form was reviewed [Negative] : Heme/Lymph

## 2025-03-04 ENCOUNTER — APPOINTMENT (OUTPATIENT)
Dept: ORTHOPEDIC SURGERY | Facility: CLINIC | Age: 65
End: 2025-03-04
Payer: COMMERCIAL

## 2025-03-04 DIAGNOSIS — M19.041 PRIMARY OSTEOARTHRITIS, RIGHT HAND: ICD-10-CM

## 2025-03-04 DIAGNOSIS — M79.641 PAIN IN RIGHT HAND: ICD-10-CM

## 2025-03-04 PROCEDURE — 99204 OFFICE O/P NEW MOD 45 MIN: CPT

## 2025-03-04 PROCEDURE — 73130 X-RAY EXAM OF HAND: CPT | Mod: RT

## 2025-03-04 RX ORDER — MELOXICAM 15 MG/1
15 TABLET ORAL
Qty: 30 | Refills: 11 | Status: ACTIVE | COMMUNITY
Start: 2025-03-04 | End: 1900-01-01

## 2025-03-07 ENCOUNTER — APPOINTMENT (OUTPATIENT)
Dept: ORTHOPEDIC SURGERY | Facility: CLINIC | Age: 65
End: 2025-03-07
Payer: COMMERCIAL

## 2025-03-07 DIAGNOSIS — S46.811A STRAIN OF OTHER MUSCLES, FASCIA AND TENDONS AT SHOULDER AND UPPER ARM LEVEL, RIGHT ARM, INITIAL ENCOUNTER: ICD-10-CM

## 2025-03-07 DIAGNOSIS — M75.111 INCOMPLETE ROTATOR CUFF TEAR OR RUPTURE OF RIGHT SHOULDER, NOT SPECIFIED AS TRAUMATIC: ICD-10-CM

## 2025-03-07 DIAGNOSIS — M75.41 IMPINGEMENT SYNDROME OF RIGHT SHOULDER: ICD-10-CM

## 2025-03-07 PROCEDURE — 99214 OFFICE O/P EST MOD 30 MIN: CPT

## 2025-03-10 ENCOUNTER — APPOINTMENT (OUTPATIENT)
Dept: OTOLARYNGOLOGY | Facility: CLINIC | Age: 65
End: 2025-03-10

## 2025-03-10 PROBLEM — S46.811A PARTIAL TEAR OF RIGHT SUBSCAPULARIS TENDON: Status: ACTIVE | Noted: 2025-03-10

## 2025-03-10 PROBLEM — M75.111 INCOMPLETE TEAR OF RIGHT ROTATOR CUFF: Status: ACTIVE | Noted: 2025-03-10

## 2025-03-18 ENCOUNTER — APPOINTMENT (OUTPATIENT)
Dept: ORTHOPEDIC SURGERY | Facility: CLINIC | Age: 65
End: 2025-03-18
Payer: COMMERCIAL

## 2025-03-18 VITALS — HEART RATE: 78 BPM | SYSTOLIC BLOOD PRESSURE: 138 MMHG | DIASTOLIC BLOOD PRESSURE: 79 MMHG

## 2025-03-18 VITALS — BODY MASS INDEX: 27.49 KG/M2 | HEIGHT: 65 IN | WEIGHT: 165 LBS

## 2025-03-18 DIAGNOSIS — M67.911 UNSPECIFIED DISORDER OF SYNOVIUM AND TENDON, RIGHT SHOULDER: ICD-10-CM

## 2025-03-18 PROCEDURE — 20610 DRAIN/INJ JOINT/BURSA W/O US: CPT | Mod: RT

## 2025-03-18 PROCEDURE — 99214 OFFICE O/P EST MOD 30 MIN: CPT | Mod: 25

## 2025-03-20 ENCOUNTER — APPOINTMENT (OUTPATIENT)
Dept: DERMATOLOGY | Facility: CLINIC | Age: 65
End: 2025-03-20
Payer: COMMERCIAL

## 2025-03-20 DIAGNOSIS — L81.4 OTHER MELANIN HYPERPIGMENTATION: ICD-10-CM

## 2025-03-20 DIAGNOSIS — D22.9 MELANOCYTIC NEVI, UNSPECIFIED: ICD-10-CM

## 2025-03-20 DIAGNOSIS — L30.9 DERMATITIS, UNSPECIFIED: ICD-10-CM

## 2025-03-20 DIAGNOSIS — D48.5 NEOPLASM OF UNCERTAIN BEHAVIOR OF SKIN: ICD-10-CM

## 2025-03-20 PROCEDURE — 11102 TANGNTL BX SKIN SINGLE LES: CPT

## 2025-03-20 PROCEDURE — 99213 OFFICE O/P EST LOW 20 MIN: CPT | Mod: 25

## 2025-03-25 ENCOUNTER — APPOINTMENT (OUTPATIENT)
Dept: ORTHOPEDIC SURGERY | Facility: CLINIC | Age: 65
End: 2025-03-25
Payer: COMMERCIAL

## 2025-03-25 VITALS — BODY MASS INDEX: 27.82 KG/M2 | WEIGHT: 167 LBS | HEIGHT: 65 IN

## 2025-03-25 DIAGNOSIS — M16.11 UNILATERAL PRIMARY OSTEOARTHRITIS, RIGHT HIP: ICD-10-CM

## 2025-03-25 PROCEDURE — G2211 COMPLEX E/M VISIT ADD ON: CPT

## 2025-03-25 PROCEDURE — 99214 OFFICE O/P EST MOD 30 MIN: CPT

## 2025-03-25 PROCEDURE — 73502 X-RAY EXAM HIP UNI 2-3 VIEWS: CPT

## 2025-03-25 RX ORDER — MELOXICAM 15 MG/1
15 TABLET ORAL
Qty: 30 | Refills: 2 | Status: ACTIVE | COMMUNITY
Start: 2025-03-25 | End: 1900-01-01

## 2025-03-26 ENCOUNTER — RX RENEWAL (OUTPATIENT)
Age: 65
End: 2025-03-26

## 2025-03-31 ENCOUNTER — TRANSCRIPTION ENCOUNTER (OUTPATIENT)
Age: 65
End: 2025-03-31

## 2025-03-31 ENCOUNTER — APPOINTMENT (OUTPATIENT)
Dept: GASTROENTEROLOGY | Facility: CLINIC | Age: 65
End: 2025-03-31
Payer: COMMERCIAL

## 2025-03-31 PROCEDURE — 99202 OFFICE O/P NEW SF 15 MIN: CPT | Mod: 93

## 2025-04-08 ENCOUNTER — APPOINTMENT (OUTPATIENT)
Dept: ORTHOPEDIC SURGERY | Facility: CLINIC | Age: 65
End: 2025-04-08
Payer: COMMERCIAL

## 2025-04-08 VITALS — HEIGHT: 65 IN | WEIGHT: 165 LBS | BODY MASS INDEX: 27.49 KG/M2

## 2025-04-08 DIAGNOSIS — M16.11 UNILATERAL PRIMARY OSTEOARTHRITIS, RIGHT HIP: ICD-10-CM

## 2025-04-08 PROCEDURE — 73502 X-RAY EXAM HIP UNI 2-3 VIEWS: CPT

## 2025-04-08 PROCEDURE — G2211 COMPLEX E/M VISIT ADD ON: CPT

## 2025-04-08 PROCEDURE — 99215 OFFICE O/P EST HI 40 MIN: CPT

## 2025-04-10 ENCOUNTER — NON-APPOINTMENT (OUTPATIENT)
Age: 65
End: 2025-04-10

## 2025-04-16 ENCOUNTER — OUTPATIENT (OUTPATIENT)
Dept: OUTPATIENT SERVICES | Facility: HOSPITAL | Age: 65
LOS: 1 days | End: 2025-04-16
Payer: COMMERCIAL

## 2025-04-16 VITALS
DIASTOLIC BLOOD PRESSURE: 72 MMHG | HEIGHT: 65 IN | SYSTOLIC BLOOD PRESSURE: 122 MMHG | TEMPERATURE: 98 F | OXYGEN SATURATION: 98 % | RESPIRATION RATE: 16 BRPM | WEIGHT: 167.99 LBS | HEART RATE: 78 BPM

## 2025-04-16 DIAGNOSIS — Z98.890 OTHER SPECIFIED POSTPROCEDURAL STATES: Chronic | ICD-10-CM

## 2025-04-16 DIAGNOSIS — Z98.1 ARTHRODESIS STATUS: Chronic | ICD-10-CM

## 2025-04-16 DIAGNOSIS — C25.9 MALIGNANT NEOPLASM OF PANCREAS, UNSPECIFIED: ICD-10-CM

## 2025-04-16 DIAGNOSIS — K82.4 CHOLESTEROLOSIS OF GALLBLADDER: ICD-10-CM

## 2025-04-16 DIAGNOSIS — Z01.818 ENCOUNTER FOR OTHER PREPROCEDURAL EXAMINATION: ICD-10-CM

## 2025-04-16 LAB
ALBUMIN SERPL ELPH-MCNC: 4.5 G/DL — SIGNIFICANT CHANGE UP (ref 3.3–5)
ALP SERPL-CCNC: 95 U/L — SIGNIFICANT CHANGE UP (ref 40–120)
ALT FLD-CCNC: 15 U/L — SIGNIFICANT CHANGE UP (ref 10–45)
ANION GAP SERPL CALC-SCNC: 14 MMOL/L — SIGNIFICANT CHANGE UP (ref 5–17)
AST SERPL-CCNC: 21 U/L — SIGNIFICANT CHANGE UP (ref 10–40)
BILIRUB SERPL-MCNC: 0.2 MG/DL — SIGNIFICANT CHANGE UP (ref 0.2–1.2)
BUN SERPL-MCNC: 15 MG/DL — SIGNIFICANT CHANGE UP (ref 7–23)
CALCIUM SERPL-MCNC: 9.4 MG/DL — SIGNIFICANT CHANGE UP (ref 8.4–10.5)
CHLORIDE SERPL-SCNC: 105 MMOL/L — SIGNIFICANT CHANGE UP (ref 96–108)
CO2 SERPL-SCNC: 23 MMOL/L — SIGNIFICANT CHANGE UP (ref 22–31)
CREAT SERPL-MCNC: 0.84 MG/DL — SIGNIFICANT CHANGE UP (ref 0.5–1.3)
EGFR: 79 ML/MIN/1.73M2 — SIGNIFICANT CHANGE UP
EGFR: 79 ML/MIN/1.73M2 — SIGNIFICANT CHANGE UP
GLUCOSE SERPL-MCNC: 95 MG/DL — SIGNIFICANT CHANGE UP (ref 70–99)
HCT VFR BLD CALC: 39 % — SIGNIFICANT CHANGE UP (ref 34.5–45)
HGB BLD-MCNC: 12.7 G/DL — SIGNIFICANT CHANGE UP (ref 11.5–15.5)
MCHC RBC-ENTMCNC: 30.5 PG — SIGNIFICANT CHANGE UP (ref 27–34)
MCHC RBC-ENTMCNC: 32.6 G/DL — SIGNIFICANT CHANGE UP (ref 32–36)
MCV RBC AUTO: 93.8 FL — SIGNIFICANT CHANGE UP (ref 80–100)
NRBC BLD AUTO-RTO: 0 /100 WBCS — SIGNIFICANT CHANGE UP (ref 0–0)
PLATELET # BLD AUTO: 251 K/UL — SIGNIFICANT CHANGE UP (ref 150–400)
POTASSIUM SERPL-MCNC: 3.6 MMOL/L — SIGNIFICANT CHANGE UP (ref 3.5–5.3)
POTASSIUM SERPL-SCNC: 3.6 MMOL/L — SIGNIFICANT CHANGE UP (ref 3.5–5.3)
PROT SERPL-MCNC: 6.8 G/DL — SIGNIFICANT CHANGE UP (ref 6–8.3)
RBC # BLD: 4.16 M/UL — SIGNIFICANT CHANGE UP (ref 3.8–5.2)
RBC # FLD: 12.5 % — SIGNIFICANT CHANGE UP (ref 10.3–14.5)
SODIUM SERPL-SCNC: 142 MMOL/L — SIGNIFICANT CHANGE UP (ref 135–145)
WBC # BLD: 4.43 K/UL — SIGNIFICANT CHANGE UP (ref 3.8–10.5)
WBC # FLD AUTO: 4.43 K/UL — SIGNIFICANT CHANGE UP (ref 3.8–10.5)

## 2025-04-16 PROCEDURE — G0463: CPT

## 2025-04-16 PROCEDURE — 85027 COMPLETE CBC AUTOMATED: CPT

## 2025-04-16 PROCEDURE — 80053 COMPREHEN METABOLIC PANEL: CPT

## 2025-04-16 NOTE — H&P PST ADULT - MUSCULOSKELETAL
Right hip pain/no joint swelling/no joint erythema/no calf tenderness/decreased ROM due to pain/normal gait/strength 5/5 bilateral upper extremities/strength 5/5 bilateral lower extremities details…

## 2025-04-16 NOTE — H&P PST ADULT - HISTORY OF PRESENT ILLNESS
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 Multilevel disc disease cervical / lumbar stenosis with radiculopathy, s/p C4-5 cervical fusion in 2017. OA with R Hip pain (planning for THR surgery in 05/2025),  gallbladder polyp and family h/o pancreatic cancer( mother & cousins) on GI follow up, last EUS / pancreatic cancer screening was on 6/10/21. Pt presents to PST scheduled for EUS for pancreatic cancer screening ANES on 04/22/25. Pt denies pain, fever, chills, CP, palpitations, owusu or dizziness.    ?

## 2025-04-16 NOTE — H&P PST ADULT - NSICDXPASTMEDICALHX_GEN_ALL_CORE_FT
PAST MEDICAL HISTORY:  Bradycardia SSS    Cervical disc displacement     Endometriosis, Mild     h/o - left Tennis Elbow     History of seizure     Hx of sleep apnea - had sleep study done in ? 2005 - does not use CPAP machine uses oral appliance    Pacemaker - insertion in 2004     Radiculopathy of cervical region     Sick sinus syndrome

## 2025-04-16 NOTE — H&P PST ADULT - ASSESSMENT
family history of pancreatic cancer:     Activity:  walks 2-3 miles few times per week, home chores  Energy Expenditure score (DASI SCORE METS): 6.8  Symptoms : denies chest pain, palpitations, dyspnea, dizziness or  [place],   Mallampati 3   Dental: Patient denies Loose teeth/Denture.

## 2025-04-16 NOTE — H&P PST ADULT - NSICDXFAMILYHX_GEN_ALL_CORE_FT
FAMILY HISTORY:  Father  Still living? Unknown  FH: aortic aneurysm, Age at diagnosis: Age Unknown    Sibling  Still living? Unknown  FH: myocardial infarction, Age at diagnosis: Age Unknown  FH: stroke, Age at diagnosis: Age Unknown

## 2025-04-16 NOTE — H&P PST ADULT - NS HP PST LATEX ALLERGY
IMPRESSION:  1   Stable pulmonary nodules  2   Interval development of mild bibasilar ground glass opacities  Though nonspecific, aspiration could be a possible source  Continued follow-up on subsequent oncologic surveillance is recommended  3   Stable size of the incompletely characterized cystic pancreatic mass    Once again, MRI utilizing a pancreatic mass protocol is recommended for complete characterization      The study was marked in EPIC for significant notification         Will send to Dr Sanya Alvares
Noted, thank you  Patient has follow up scheduled today with Dr Ryan Coyle 
No

## 2025-04-16 NOTE — H&P PST ADULT - OTHER CARE PROVIDERS
Cardiologist . Hayde Padilla  , Neuro Dr.Hwang Zacarias ( well controlled focal seizure history / last seen 05/2024)

## 2025-04-16 NOTE — H&P PST ADULT - BLOOD AVOIDANCE/RESTRICTIONS, PROFILE
Discharge phone call, no answer, left message  
No. TAM screening performed.  STOP BANG Legend: 0-2 = LOW Risk; 3-4 = INTERMEDIATE Risk; 5-8 = HIGH Risk
none

## 2025-04-16 NOTE — H&P PST ADULT - NSICDXPASTSURGICALHX_GEN_ALL_CORE_FT
PAST SURGICAL HISTORY:  h/o cardiac pacemaker insertion - 2004 battery replaced 2017, [employer] Model L331/ serial [id number], interrogated 12/15/2020    H/O colonoscopy     History of D&C 2012    History of D&C - 15 yrs ago     History of laparoscopy - many yrs ago     S/P cervical spinal fusion C4-5 2019    S/P ear surgery s/p wound closure of left ear s/p mechanical fall

## 2025-04-17 ENCOUNTER — APPOINTMENT (OUTPATIENT)
Dept: ELECTROPHYSIOLOGY | Facility: CLINIC | Age: 65
End: 2025-04-17
Payer: COMMERCIAL

## 2025-04-17 ENCOUNTER — NON-APPOINTMENT (OUTPATIENT)
Age: 65
End: 2025-04-17

## 2025-04-17 VITALS — DIASTOLIC BLOOD PRESSURE: 67 MMHG | HEART RATE: 65 BPM | SYSTOLIC BLOOD PRESSURE: 119 MMHG

## 2025-04-17 PROBLEM — Z87.898 PERSONAL HISTORY OF OTHER SPECIFIED CONDITIONS: Chronic | Status: ACTIVE | Noted: 2025-04-16

## 2025-04-17 PROCEDURE — 93280 PM DEVICE PROGR EVAL DUAL: CPT

## 2025-04-22 ENCOUNTER — OUTPATIENT (OUTPATIENT)
Dept: OUTPATIENT SERVICES | Facility: HOSPITAL | Age: 65
LOS: 1 days | End: 2025-04-22
Payer: COMMERCIAL

## 2025-04-22 ENCOUNTER — TRANSCRIPTION ENCOUNTER (OUTPATIENT)
Age: 65
End: 2025-04-22

## 2025-04-22 ENCOUNTER — APPOINTMENT (OUTPATIENT)
Dept: GASTROENTEROLOGY | Facility: HOSPITAL | Age: 65
End: 2025-04-22

## 2025-04-22 ENCOUNTER — RESULT REVIEW (OUTPATIENT)
Age: 65
End: 2025-04-22

## 2025-04-22 VITALS
TEMPERATURE: 98 F | RESPIRATION RATE: 20 BRPM | HEART RATE: 65 BPM | WEIGHT: 164.91 LBS | SYSTOLIC BLOOD PRESSURE: 166 MMHG | OXYGEN SATURATION: 98 % | DIASTOLIC BLOOD PRESSURE: 84 MMHG | HEIGHT: 65.5 IN

## 2025-04-22 VITALS
OXYGEN SATURATION: 99 % | RESPIRATION RATE: 20 BRPM | DIASTOLIC BLOOD PRESSURE: 79 MMHG | HEART RATE: 68 BPM | SYSTOLIC BLOOD PRESSURE: 137 MMHG

## 2025-04-22 DIAGNOSIS — Z98.890 OTHER SPECIFIED POSTPROCEDURAL STATES: Chronic | ICD-10-CM

## 2025-04-22 DIAGNOSIS — C25.9 MALIGNANT NEOPLASM OF PANCREAS, UNSPECIFIED: ICD-10-CM

## 2025-04-22 DIAGNOSIS — Z98.1 ARTHRODESIS STATUS: Chronic | ICD-10-CM

## 2025-04-22 PROCEDURE — 88305 TISSUE EXAM BY PATHOLOGIST: CPT | Mod: 26

## 2025-04-22 PROCEDURE — 43239 EGD BIOPSY SINGLE/MULTIPLE: CPT

## 2025-04-22 PROCEDURE — 43259 EGD US EXAM DUODENUM/JEJUNUM: CPT

## 2025-04-22 PROCEDURE — 88305 TISSUE EXAM BY PATHOLOGIST: CPT

## 2025-04-22 PROCEDURE — 43237 ENDOSCOPIC US EXAM ESOPH: CPT

## 2025-04-22 NOTE — ASU PATIENT PROFILE, ADULT - FALL HARM RISK - HARM RISK INTERVENTIONS

## 2025-04-22 NOTE — PRE PROCEDURE NOTE - PRE PROCEDURE EVALUATION
Attending Physician:    Kody                Procedure: EUS    Indication for Procedure: fam hx of panc CA  ________________________________________________________  PAST MEDICAL & SURGICAL HISTORY:  Pacemaker - insertion in 2004      Endometriosis, Mild      h/o - left Tennis Elbow      Hx of sleep apnea - had sleep study done in ? 2005 - does not use CPAP machine  uses oral appliance      Bradycardia  SSS      Cervical disc displacement      Radiculopathy of cervical region      Sick sinus syndrome      History of seizure      History of D&C - 15 yrs ago      History of laparoscopy - many yrs ago      h/o cardiac pacemaker insertion - 2004  battery replaced 2017, [employer] Model L331/ serial [id number], interrogated 12/15/2020      H/O colonoscopy      History of D&C  2012      S/P cervical spinal fusion  C4-5 2019      S/P ear surgery  s/p wound closure of left ear s/p mechanical fall        ALLERGIES:  morphine (Vomiting)  sulfa drugs (Unknown)  Duricef (Unknown)  Diflucan (Rash)    HOME MEDICATIONS:  atenolol 25 mg oral tablet: 1 tab(s) orally once a day  lamoTRIgine 100 mg oral tablet: 1 tab(s) orally once a day (in the morning)  lamoTRIgine 150 mg oral tablet: 1 tab(s) orally once a day (at bedtime)    AICD/PPM: [ ] yes   [ x] no    PERTINENT LAB DATA:                      PHYSICAL EXAMINATION:    vitals wnl    Constitutional: NAD  Neck:  supple  Respiratory: no respiratory stress, no audible wheezes  Cardiovascular: RR  Gastrointestinal: soft, NT/ND  Extremities: No peripheral edema  Neurological: Alert, no focal deficits  Psychiatric: Normal mood, normal affect  Skin: No rashes      COMMENTS:    The patient is a suitable candidate for the planned procedure unless box checked [ ]  No, explain:

## 2025-04-22 NOTE — ASU PATIENT PROFILE, ADULT - NSICDXPASTSURGICALHX_GEN_ALL_CORE_FT
PAST SURGICAL HISTORY:  h/o cardiac pacemaker insertion - 2004 battery replaced 2017, [facility] Model L331/ serial [id number], interrogated 12/15/2020    H/O colonoscopy     History of D&C 2012    History of D&C - 15 yrs ago     History of laparoscopy - many yrs ago     S/P cervical spinal fusion C4-5 2019    S/P ear surgery s/p wound closure of left ear s/p mechanical fall

## 2025-04-22 NOTE — ASU PATIENT PROFILE, ADULT - NSTOBACCONEVERSMOKERY/N_GEN_A
Julio C Bowman,  Your CBC (checks for infection and anemia) is normal.  Sincerely,     Susan Haase, CNP No

## 2025-04-24 ENCOUNTER — TRANSCRIPTION ENCOUNTER (OUTPATIENT)
Age: 65
End: 2025-04-24

## 2025-04-24 LAB — SURGICAL PATHOLOGY STUDY: SIGNIFICANT CHANGE UP

## 2025-04-25 ENCOUNTER — APPOINTMENT (OUTPATIENT)
Dept: NEUROLOGY | Facility: CLINIC | Age: 65
End: 2025-04-25
Payer: COMMERCIAL

## 2025-04-25 VITALS
WEIGHT: 165 LBS | HEART RATE: 65 BPM | HEIGHT: 65 IN | DIASTOLIC BLOOD PRESSURE: 75 MMHG | BODY MASS INDEX: 27.49 KG/M2 | SYSTOLIC BLOOD PRESSURE: 124 MMHG

## 2025-04-25 DIAGNOSIS — G95.9 DISEASE OF SPINAL CORD, UNSPECIFIED: ICD-10-CM

## 2025-04-25 DIAGNOSIS — G40.909 EPILEPSY, UNSPECIFIED, NOT INTRACTABLE, W/OUT STATUS EPILEPTICUS: ICD-10-CM

## 2025-04-25 PROCEDURE — G2211 COMPLEX E/M VISIT ADD ON: CPT

## 2025-04-25 PROCEDURE — 99213 OFFICE O/P EST LOW 20 MIN: CPT

## 2025-04-28 DIAGNOSIS — Z95.0 PRESENCE OF CARDIAC PACEMAKER: ICD-10-CM

## 2025-04-29 ENCOUNTER — NON-APPOINTMENT (OUTPATIENT)
Age: 65
End: 2025-04-29

## 2025-04-29 ENCOUNTER — APPOINTMENT (OUTPATIENT)
Dept: CARDIOLOGY | Facility: CLINIC | Age: 65
End: 2025-04-29

## 2025-04-29 ENCOUNTER — APPOINTMENT (OUTPATIENT)
Dept: CARDIOLOGY | Facility: CLINIC | Age: 65
End: 2025-04-29
Payer: COMMERCIAL

## 2025-04-29 VITALS
WEIGHT: 165 LBS | HEART RATE: 68 BPM | SYSTOLIC BLOOD PRESSURE: 126 MMHG | TEMPERATURE: 98 F | OXYGEN SATURATION: 98 % | BODY MASS INDEX: 27.49 KG/M2 | HEIGHT: 65 IN | DIASTOLIC BLOOD PRESSURE: 77 MMHG

## 2025-04-29 PROCEDURE — 93306 TTE W/DOPPLER COMPLETE: CPT

## 2025-04-29 PROCEDURE — XXXXX: CPT | Mod: 1L

## 2025-05-01 ENCOUNTER — OUTPATIENT (OUTPATIENT)
Dept: OUTPATIENT SERVICES | Facility: HOSPITAL | Age: 65
LOS: 1 days | End: 2025-05-01
Payer: COMMERCIAL

## 2025-05-01 VITALS
OXYGEN SATURATION: 98 % | SYSTOLIC BLOOD PRESSURE: 119 MMHG | HEART RATE: 65 BPM | DIASTOLIC BLOOD PRESSURE: 78 MMHG | WEIGHT: 166.01 LBS | RESPIRATION RATE: 18 BRPM | HEIGHT: 65.5 IN | TEMPERATURE: 98 F

## 2025-05-01 DIAGNOSIS — M19.90 UNSPECIFIED OSTEOARTHRITIS, UNSPECIFIED SITE: ICD-10-CM

## 2025-05-01 DIAGNOSIS — M16.11 UNILATERAL PRIMARY OSTEOARTHRITIS, RIGHT HIP: ICD-10-CM

## 2025-05-01 DIAGNOSIS — Z98.1 ARTHRODESIS STATUS: Chronic | ICD-10-CM

## 2025-05-01 DIAGNOSIS — Z98.890 OTHER SPECIFIED POSTPROCEDURAL STATES: Chronic | ICD-10-CM

## 2025-05-01 DIAGNOSIS — Z01.818 ENCOUNTER FOR OTHER PREPROCEDURAL EXAMINATION: ICD-10-CM

## 2025-05-01 DIAGNOSIS — Z95.0 PRESENCE OF CARDIAC PACEMAKER: ICD-10-CM

## 2025-05-01 DIAGNOSIS — R56.9 UNSPECIFIED CONVULSIONS: ICD-10-CM

## 2025-05-01 LAB
A1C WITH ESTIMATED AVERAGE GLUCOSE RESULT: 5.7 % — HIGH (ref 4–5.6)
ANION GAP SERPL CALC-SCNC: 14 MMOL/L — SIGNIFICANT CHANGE UP (ref 5–17)
BUN SERPL-MCNC: 18 MG/DL — SIGNIFICANT CHANGE UP (ref 7–23)
CALCIUM SERPL-MCNC: 9.3 MG/DL — SIGNIFICANT CHANGE UP (ref 8.4–10.5)
CHLORIDE SERPL-SCNC: 104 MMOL/L — SIGNIFICANT CHANGE UP (ref 96–108)
CO2 SERPL-SCNC: 23 MMOL/L — SIGNIFICANT CHANGE UP (ref 22–31)
CREAT SERPL-MCNC: 0.85 MG/DL — SIGNIFICANT CHANGE UP (ref 0.5–1.3)
EGFR: 77 ML/MIN/1.73M2 — SIGNIFICANT CHANGE UP
EGFR: 77 ML/MIN/1.73M2 — SIGNIFICANT CHANGE UP
ESTIMATED AVERAGE GLUCOSE: 117 MG/DL — HIGH (ref 68–114)
GLUCOSE SERPL-MCNC: 83 MG/DL — SIGNIFICANT CHANGE UP (ref 70–99)
HCT VFR BLD CALC: 39.5 % — SIGNIFICANT CHANGE UP (ref 34.5–45)
HGB BLD-MCNC: 12.7 G/DL — SIGNIFICANT CHANGE UP (ref 11.5–15.5)
MCHC RBC-ENTMCNC: 30.4 PG — SIGNIFICANT CHANGE UP (ref 27–34)
MCHC RBC-ENTMCNC: 32.2 G/DL — SIGNIFICANT CHANGE UP (ref 32–36)
MCV RBC AUTO: 94.5 FL — SIGNIFICANT CHANGE UP (ref 80–100)
MRSA PCR RESULT.: SIGNIFICANT CHANGE UP
NRBC BLD AUTO-RTO: 0 /100 WBCS — SIGNIFICANT CHANGE UP (ref 0–0)
PLATELET # BLD AUTO: 247 K/UL — SIGNIFICANT CHANGE UP (ref 150–400)
POTASSIUM SERPL-MCNC: 4.2 MMOL/L — SIGNIFICANT CHANGE UP (ref 3.5–5.3)
POTASSIUM SERPL-SCNC: 4.2 MMOL/L — SIGNIFICANT CHANGE UP (ref 3.5–5.3)
RBC # BLD: 4.18 M/UL — SIGNIFICANT CHANGE UP (ref 3.8–5.2)
RBC # FLD: 12.6 % — SIGNIFICANT CHANGE UP (ref 10.3–14.5)
S AUREUS DNA NOSE QL NAA+PROBE: SIGNIFICANT CHANGE UP
SODIUM SERPL-SCNC: 141 MMOL/L — SIGNIFICANT CHANGE UP (ref 135–145)
WBC # BLD: 5.4 K/UL — SIGNIFICANT CHANGE UP (ref 3.8–10.5)
WBC # FLD AUTO: 5.4 K/UL — SIGNIFICANT CHANGE UP (ref 3.8–10.5)

## 2025-05-01 PROCEDURE — 83036 HEMOGLOBIN GLYCOSYLATED A1C: CPT

## 2025-05-01 PROCEDURE — 80048 BASIC METABOLIC PNL TOTAL CA: CPT

## 2025-05-01 PROCEDURE — 87640 STAPH A DNA AMP PROBE: CPT

## 2025-05-01 PROCEDURE — 87641 MR-STAPH DNA AMP PROBE: CPT

## 2025-05-01 PROCEDURE — 85027 COMPLETE CBC AUTOMATED: CPT

## 2025-05-01 PROCEDURE — 36415 COLL VENOUS BLD VENIPUNCTURE: CPT

## 2025-05-01 NOTE — H&P PST ADULT - NSICDXPASTSURGICALHX_GEN_ALL_CORE_FT
PAST SURGICAL HISTORY:  h/o cardiac pacemaker insertion - 2004 battery replaced 2017, [employer] Model L331/ serial [id number], interrogated 12/15/2020    H/O colonoscopy     History of D&C 2012    History of D&C - 15 yrs ago     History of laparoscopy - many yrs ago     History of skin surgery     S/P cervical spinal fusion C4-5 2019    S/P ear surgery s/p wound closure of left ear s/p mechanical fall

## 2025-05-01 NOTE — H&P PST ADULT - PAIN SCORE
Modified Advancement Flap Text: The defect edges were debeveled with a #15 scalpel blade.  Given the location of the defect, shape of the defect and the proximity to free margins a modified advancement flap was deemed most appropriate.  Using a sterile surgical marker, an appropriate advancement flap was drawn incorporating the defect and placing the expected incisions within the relaxed skin tension lines where possible.    The area thus outlined was incised deep to adipose tissue with a #15 scalpel blade.  The skin margins were undermined to an appropriate distance in all directions utilizing iris scissors. 0

## 2025-05-01 NOTE — H&P PST ADULT - ASSESSMENT
CAPRINI SCORE    AGE RELATED RISK FACTORS                                                             [ ] Age 41-60 years                                            (1 Point)  x ] Age: 61-74 years                                           (2 Points)                 [ ] Age= 75 years                                                (3 Points)             DISEASE RELATED RISK FACTORS                                                       [ ] Edema in the lower extremities                 (1 Point)                     [ ] Varicose veins                                               (1 Point)                                 x[ ] BMI > 25 Kg/m2                                            (1 Point)                                  [ ] Serious infection (ie PNA)                            (1 Point)                     [ ] Lung disease ( COPD, Emphysema)            (1 Point)                                                                          [ ] Acute myocardial infarction                         (1 Point)                  [ ] Congestive heart failure (in the previous month)  (1 Point)         [ ] Inflammatory bowel disease                            (1 Point)                  [ ] Central venous access, PICC or Port               (2 points)       (within the last month)                                                                [ ] Stroke (in the previous month)                        (5 Points)    [ ] Previous or present malignancy                       (2 points)                                                                                                                                                         HEMATOLOGY RELATED FACTORS                                                         [ ] Prior episodes of VTE                                     (3 Points)                     [ ] Positive family history for VTE                      (3 Points)                  [ ] Prothrombin 73573 A                                     (3 Points)                     [ ] Factor V Leiden                                                (3 Points)                        [ ] Lupus anticoagulants                                      (3 Points)                                                           [ ] Anticardiolipin antibodies                              (3 Points)                                                       [ ] High homocysteine in the blood                   (3 Points)                                             [ ] Other congenital or acquired thrombophilia      (3 Points)                                                [ ] Heparin induced thrombocytopenia                  (3 Points)                                        MOBILITY RELATED FACTORS  [ ] Bed rest                                                         (1 Point)  [ ] Plaster cast                                                    (2 points)  [ ] Bed bound for more than 72 hours           (2 Points)    GENDER SPECIFIC FACTORS  [ ] Pregnancy or had a baby within the last month   (1 Point)  [ ] Post-partum < 6 weeks                                   (1 Point)  [ ] Hormonal therapy  or oral contraception   (1 Point)  [ ] History of pregnancy complications              (1 point)  [ ] Unexplained or recurrent              (1 Point)    OTHER RISK FACTORS                                           (1 Point)  [ ] BMI >40, smoking, diabetes requiring insulin, chemotherapy  blood transfusions and length of surgery over 2 hours    SURGERY RELATED RISK FACTORS  [ ]  Section within the last month     (1 Point)  [ ] Minor surgery                                                  (1 Point)  [ ] Arthroscopic surgery                                       (2 Points)  [ ] Planned major surgery lasting more            (2 Points)      than 45 minutes     [ x] Elective hip or knee joint replacement       (5 points)       surgery                                                TRAUMA RELATED RISK FACTORS  [ ] Fracture of the hip, pelvis, or leg                       (5 Points)  [ ] Spinal cord injury resulting in paralysis             (5 points)       (in the previous month)    [ ] Paralysis  (less than 1 month)                             (5 Points)  [ ] Multiple Trauma within 1 month                        (5 Points)    Total Score [      8  ]    Caprini Score 0-2: Low Risk, NO VTE prophylaxis required for most patients, encourage ambulation  Caprini Score 3-6: Moderate Risk , pharmacologic VTE prophylaxis is indicated for most patients (in the absence of contraindications)  Caprini Score Greater than or =7: High risk, pharmocologic VTE prophylaxis indicated for most patients (in the absence of contraindications)

## 2025-05-01 NOTE — H&P PST ADULT - PROBLEM SELECTOR PLAN 1
educated on instructions  Chlorhexidine wash given to patient and instructions  ERP instructions given to patient and verbalizes understanding   Labs in PST: CBC BMP MRSA A1C   Day of: FS

## 2025-05-01 NOTE — H&P PST ADULT - NEGATIVE GENERAL GENITOURINARY SYMPTOMS
no hematuria/no renal colic/no flank pain L/no flank pain R/no incontinence/no urinary hesitancy/normal urinary frequency/no nocturia

## 2025-05-01 NOTE — H&P PST ADULT - NSICDXPASTMEDICALHX_GEN_ALL_CORE_FT
PAST MEDICAL HISTORY:  Bradycardia SSS    Cervical disc displacement     Endometriosis, Mild     h/o - left Tennis Elbow     H/O squamous cell carcinoma excision     History of seizure     Hx of sleep apnea - had sleep study done in ? 2005 - does not use CPAP machine uses oral appliance    Osteoarthritis     Pacemaker - insertion in 2004     Radiculopathy of cervical region     Sick sinus syndrome

## 2025-05-01 NOTE — H&P PST ADULT - HISTORY OF PRESENT ILLNESS
Patient comes to PST for right total hip arthroplasty direct anterior approach with MD Meier on 5/19/25. Patient has a pmhx of cervical disc, osteoarthritis, bradycardia (Pacemaker 2004- pt had a syncopal episode x2 and was found to have bradycardia and needed emergency pacemaker, since then no episodes, follows with cardiology, battery change 2017), seizures( 2004 and 2007 none since is on Lamictal and follows with neurology), sick sinus syndrome, mild sleep apnea ( does not uses CPAP, has retainers, was tested around 2004).      Patient reports right hip discomfort for about 3 years. Reports over the last year has been worsening. Denies nay  numbness or tingling to leg. Resting alleviates some discomfort. Reports walking causes discomfort and some swelling at times. Denies any injections to hip. Denies any injury or falls. No assistive devices to walk. Denies any other complaints.

## 2025-05-01 NOTE — H&P PST ADULT - ADDITIONAL PE
DASI score: 9.89  DASI activity: walks about 7000 steps a day- denies any cardiac symptoms   Loose teeth or denture: denies, permanent retainers

## 2025-05-01 NOTE — H&P PST ADULT - PROBLEM SELECTOR PLAN 3
Follows with cardiology  last appt 4/28/25  Last echo 4/29/25- copy in chart  pacemaker card copied in chart

## 2025-05-05 ENCOUNTER — APPOINTMENT (OUTPATIENT)
Dept: CARDIOLOGY | Facility: CLINIC | Age: 65
End: 2025-05-05

## 2025-05-05 ENCOUNTER — NON-APPOINTMENT (OUTPATIENT)
Age: 65
End: 2025-05-05

## 2025-05-05 ENCOUNTER — APPOINTMENT (OUTPATIENT)
Dept: INTERNAL MEDICINE | Facility: CLINIC | Age: 65
End: 2025-05-05

## 2025-05-05 VITALS
SYSTOLIC BLOOD PRESSURE: 131 MMHG | HEART RATE: 62 BPM | DIASTOLIC BLOOD PRESSURE: 75 MMHG | BODY MASS INDEX: 27.46 KG/M2 | WEIGHT: 165 LBS | OXYGEN SATURATION: 98 %

## 2025-05-05 DIAGNOSIS — I45.9 CONDUCTION DISORDER, UNSPECIFIED: ICD-10-CM

## 2025-05-05 PROCEDURE — 93000 ELECTROCARDIOGRAM COMPLETE: CPT

## 2025-05-05 PROCEDURE — 99417 PROLNG OP E/M EACH 15 MIN: CPT

## 2025-05-05 PROCEDURE — 96041 GENETIC COUNSELING SVC EA 30: CPT

## 2025-05-05 PROCEDURE — 99245 OFF/OP CONSLTJ NEW/EST HI 55: CPT

## 2025-05-06 ENCOUNTER — APPOINTMENT (OUTPATIENT)
Dept: INTERNAL MEDICINE | Facility: CLINIC | Age: 65
End: 2025-05-06
Payer: COMMERCIAL

## 2025-05-06 VITALS
SYSTOLIC BLOOD PRESSURE: 120 MMHG | RESPIRATION RATE: 14 BRPM | DIASTOLIC BLOOD PRESSURE: 60 MMHG | OXYGEN SATURATION: 98 % | BODY MASS INDEX: 28.16 KG/M2 | WEIGHT: 169 LBS | HEART RATE: 72 BPM | HEIGHT: 65 IN

## 2025-05-06 DIAGNOSIS — Z01.818 ENCOUNTER FOR OTHER PREPROCEDURAL EXAMINATION: ICD-10-CM

## 2025-05-06 PROBLEM — Z98.890 OTHER SPECIFIED POSTPROCEDURAL STATES: Chronic | Status: ACTIVE | Noted: 2025-05-01

## 2025-05-06 PROBLEM — M19.90 UNSPECIFIED OSTEOARTHRITIS, UNSPECIFIED SITE: Chronic | Status: ACTIVE | Noted: 2025-05-01

## 2025-05-06 PROCEDURE — 99214 OFFICE O/P EST MOD 30 MIN: CPT

## 2025-05-07 ENCOUNTER — APPOINTMENT (OUTPATIENT)
Dept: DERMATOLOGY | Facility: CLINIC | Age: 65
End: 2025-05-07
Payer: COMMERCIAL

## 2025-05-07 DIAGNOSIS — Z12.83 ENCOUNTER FOR SCREENING FOR MALIGNANT NEOPLASM OF SKIN: ICD-10-CM

## 2025-05-07 DIAGNOSIS — D22.9 MELANOCYTIC NEVI, UNSPECIFIED: ICD-10-CM

## 2025-05-07 DIAGNOSIS — L81.4 OTHER MELANIN HYPERPIGMENTATION: ICD-10-CM

## 2025-05-07 PROCEDURE — 99213 OFFICE O/P EST LOW 20 MIN: CPT

## 2025-05-12 ENCOUNTER — NON-APPOINTMENT (OUTPATIENT)
Age: 65
End: 2025-05-12

## 2025-05-19 ENCOUNTER — TRANSCRIPTION ENCOUNTER (OUTPATIENT)
Age: 65
End: 2025-05-19

## 2025-05-19 ENCOUNTER — INPATIENT (INPATIENT)
Facility: HOSPITAL | Age: 65
LOS: 0 days | Discharge: HOME | DRG: 554 | End: 2025-05-20
Attending: ORTHOPAEDIC SURGERY | Admitting: ORTHOPAEDIC SURGERY
Payer: COMMERCIAL

## 2025-05-19 ENCOUNTER — APPOINTMENT (OUTPATIENT)
Dept: ORTHOPEDIC SURGERY | Facility: HOSPITAL | Age: 65
End: 2025-05-19

## 2025-05-19 VITALS
SYSTOLIC BLOOD PRESSURE: 124 MMHG | TEMPERATURE: 98 F | WEIGHT: 166.01 LBS | HEART RATE: 65 BPM | RESPIRATION RATE: 18 BRPM | OXYGEN SATURATION: 97 % | HEIGHT: 65.51 IN | DIASTOLIC BLOOD PRESSURE: 78 MMHG

## 2025-05-19 DIAGNOSIS — Z29.9 ENCOUNTER FOR PROPHYLACTIC MEASURES, UNSPECIFIED: ICD-10-CM

## 2025-05-19 DIAGNOSIS — Z86.79 PERSONAL HISTORY OF OTHER DISEASES OF THE CIRCULATORY SYSTEM: ICD-10-CM

## 2025-05-19 DIAGNOSIS — Z98.890 OTHER SPECIFIED POSTPROCEDURAL STATES: Chronic | ICD-10-CM

## 2025-05-19 DIAGNOSIS — M16.11 UNILATERAL PRIMARY OSTEOARTHRITIS, RIGHT HIP: ICD-10-CM

## 2025-05-19 DIAGNOSIS — Z98.1 ARTHRODESIS STATUS: Chronic | ICD-10-CM

## 2025-05-19 PROCEDURE — 27130 TOTAL HIP ARTHROPLASTY: CPT | Mod: RT

## 2025-05-19 PROCEDURE — 99222 1ST HOSP IP/OBS MODERATE 55: CPT

## 2025-05-19 PROCEDURE — 72170 X-RAY EXAM OF PELVIS: CPT | Mod: 26

## 2025-05-19 DEVICE — SHELL ACET TRIDENT II E 52MM: Type: IMPLANTABLE DEVICE | Site: RIGHT | Status: FUNCTIONAL

## 2025-05-19 DEVICE — HEAD FEM CER V40 36MM  PLUS 0MM: Type: IMPLANTABLE DEVICE | Site: RIGHT | Status: FUNCTIONAL

## 2025-05-19 DEVICE — STEM ACC V40 127 DEG SZ 3 30X102MM 127 DEG: Type: IMPLANTABLE DEVICE | Site: RIGHT | Status: FUNCTIONAL

## 2025-05-19 DEVICE — LINER ACET TRIDENT X3 0 DEG 36MM E: Type: IMPLANTABLE DEVICE | Site: RIGHT | Status: FUNCTIONAL

## 2025-05-19 RX ORDER — OXYCODONE HYDROCHLORIDE 30 MG/1
5 TABLET ORAL EVERY 4 HOURS
Refills: 0 | Status: DISCONTINUED | OUTPATIENT
Start: 2025-05-19 | End: 2025-05-20

## 2025-05-19 RX ORDER — ACETAMINOPHEN 500 MG/5ML
1000 LIQUID (ML) ORAL ONCE
Refills: 0 | Status: COMPLETED | OUTPATIENT
Start: 2025-05-19 | End: 2025-05-19

## 2025-05-19 RX ORDER — TRAMADOL HYDROCHLORIDE 50 MG/1
50 TABLET, FILM COATED ORAL EVERY 6 HOURS
Refills: 0 | Status: DISCONTINUED | OUTPATIENT
Start: 2025-05-19 | End: 2025-05-20

## 2025-05-19 RX ORDER — MAGNESIUM HYDROXIDE 400 MG/5ML
30 SUSPENSION ORAL DAILY
Refills: 0 | Status: DISCONTINUED | OUTPATIENT
Start: 2025-05-19 | End: 2025-05-20

## 2025-05-19 RX ORDER — ASPIRIN 325 MG
81 TABLET ORAL EVERY 12 HOURS
Refills: 0 | Status: DISCONTINUED | OUTPATIENT
Start: 2025-05-19 | End: 2025-05-20

## 2025-05-19 RX ORDER — NAPROXEN SODIUM 275 MG
500 TABLET ORAL EVERY 12 HOURS
Refills: 0 | Status: DISCONTINUED | OUTPATIENT
Start: 2025-05-20 | End: 2025-05-20

## 2025-05-19 RX ORDER — HYDROMORPHONE/SOD CHLOR,ISO/PF 2 MG/10 ML
0.5 SYRINGE (ML) INJECTION
Refills: 0 | Status: DISCONTINUED | OUTPATIENT
Start: 2025-05-19 | End: 2025-05-19

## 2025-05-19 RX ORDER — SENNA 187 MG
2 TABLET ORAL AT BEDTIME
Refills: 0 | Status: DISCONTINUED | OUTPATIENT
Start: 2025-05-19 | End: 2025-05-20

## 2025-05-19 RX ORDER — CEFAZOLIN SODIUM IN 0.9 % NACL 3 G/100 ML
2000 INTRAVENOUS SOLUTION, PIGGYBACK (ML) INTRAVENOUS EVERY 8 HOURS
Refills: 0 | Status: DISCONTINUED | OUTPATIENT
Start: 2025-05-19 | End: 2025-05-20

## 2025-05-19 RX ORDER — ONDANSETRON HCL/PF 4 MG/2 ML
4 VIAL (ML) INJECTION ONCE
Refills: 0 | Status: DISCONTINUED | OUTPATIENT
Start: 2025-05-19 | End: 2025-05-19

## 2025-05-19 RX ORDER — LISINOPRIL 30 MG/1
25 TABLET ORAL DAILY
Refills: 0 | Status: DISCONTINUED | OUTPATIENT
Start: 2025-05-19 | End: 2025-05-20

## 2025-05-19 RX ORDER — LIDOCAINE HCL/PF 10 MG/ML
0.2 VIAL (ML) INJECTION ONCE
Refills: 0 | Status: DISCONTINUED | OUTPATIENT
Start: 2025-05-19 | End: 2025-05-19

## 2025-05-19 RX ORDER — OXYCODONE HYDROCHLORIDE 30 MG/1
10 TABLET ORAL EVERY 4 HOURS
Refills: 0 | Status: DISCONTINUED | OUTPATIENT
Start: 2025-05-19 | End: 2025-05-20

## 2025-05-19 RX ORDER — ACETAMINOPHEN 500 MG/5ML
1000 LIQUID (ML) ORAL EVERY 8 HOURS
Refills: 0 | Status: DISCONTINUED | OUTPATIENT
Start: 2025-05-20 | End: 2025-05-20

## 2025-05-19 RX ORDER — POLYETHYLENE GLYCOL 3350 17 G/17G
17 POWDER, FOR SOLUTION ORAL AT BEDTIME
Refills: 0 | Status: DISCONTINUED | OUTPATIENT
Start: 2025-05-19 | End: 2025-05-20

## 2025-05-19 RX ORDER — SODIUM CHLORIDE 9 G/1000ML
1000 INJECTION, SOLUTION INTRAVENOUS
Refills: 0 | Status: DISCONTINUED | OUTPATIENT
Start: 2025-05-19 | End: 2025-05-19

## 2025-05-19 RX ORDER — LAMOTRIGINE 150 MG/1
100 TABLET ORAL DAILY
Refills: 0 | Status: DISCONTINUED | OUTPATIENT
Start: 2025-05-19 | End: 2025-05-20

## 2025-05-19 RX ORDER — CEFAZOLIN SODIUM IN 0.9 % NACL 3 G/100 ML
2000 INTRAVENOUS SOLUTION, PIGGYBACK (ML) INTRAVENOUS ONCE
Refills: 0 | Status: COMPLETED | OUTPATIENT
Start: 2025-05-19 | End: 2025-05-19

## 2025-05-19 RX ORDER — KETOROLAC TROMETHAMINE 30 MG/ML
30 INJECTION, SOLUTION INTRAMUSCULAR; INTRAVENOUS EVERY 6 HOURS
Refills: 0 | Status: DISCONTINUED | OUTPATIENT
Start: 2025-05-19 | End: 2025-05-20

## 2025-05-19 RX ORDER — ACETAMINOPHEN 500 MG/5ML
1000 LIQUID (ML) ORAL ONCE
Refills: 0 | Status: COMPLETED | OUTPATIENT
Start: 2025-05-20 | End: 2025-05-20

## 2025-05-19 RX ORDER — DEXAMETHASONE 0.5 MG/1
8 TABLET ORAL ONCE
Refills: 0 | Status: COMPLETED | OUTPATIENT
Start: 2025-05-20 | End: 2025-05-20

## 2025-05-19 RX ORDER — LAMOTRIGINE 150 MG/1
150 TABLET ORAL AT BEDTIME
Refills: 0 | Status: DISCONTINUED | OUTPATIENT
Start: 2025-05-19 | End: 2025-05-20

## 2025-05-19 RX ORDER — ONDANSETRON HCL/PF 4 MG/2 ML
4 VIAL (ML) INJECTION EVERY 6 HOURS
Refills: 0 | Status: DISCONTINUED | OUTPATIENT
Start: 2025-05-19 | End: 2025-05-20

## 2025-05-19 RX ADMIN — Medication 500 MILLILITER(S): at 14:02

## 2025-05-19 RX ADMIN — TRAMADOL HYDROCHLORIDE 50 MILLIGRAM(S): 50 TABLET, FILM COATED ORAL at 20:25

## 2025-05-19 RX ADMIN — LAMOTRIGINE 150 MILLIGRAM(S): 150 TABLET ORAL at 21:11

## 2025-05-19 RX ADMIN — Medication 100 MILLIGRAM(S): at 16:07

## 2025-05-19 RX ADMIN — Medication 81 MILLIGRAM(S): at 17:08

## 2025-05-19 RX ADMIN — Medication 40 MILLIGRAM(S): at 06:17

## 2025-05-19 RX ADMIN — Medication 500 MILLILITER(S): at 11:37

## 2025-05-19 RX ADMIN — Medication 100 MILLIGRAM(S): at 22:20

## 2025-05-19 RX ADMIN — KETOROLAC TROMETHAMINE 30 MILLIGRAM(S): 30 INJECTION, SOLUTION INTRAMUSCULAR; INTRAVENOUS at 21:11

## 2025-05-19 RX ADMIN — Medication 1000 MILLIGRAM(S): at 16:22

## 2025-05-19 RX ADMIN — TRAMADOL HYDROCHLORIDE 50 MILLIGRAM(S): 50 TABLET, FILM COATED ORAL at 19:25

## 2025-05-19 RX ADMIN — Medication 400 MILLIGRAM(S): at 22:20

## 2025-05-19 RX ADMIN — KETOROLAC TROMETHAMINE 30 MILLIGRAM(S): 30 INJECTION, SOLUTION INTRAMUSCULAR; INTRAVENOUS at 15:22

## 2025-05-19 RX ADMIN — Medication 1000 MILLIGRAM(S): at 23:20

## 2025-05-19 RX ADMIN — KETOROLAC TROMETHAMINE 30 MILLIGRAM(S): 30 INJECTION, SOLUTION INTRAMUSCULAR; INTRAVENOUS at 16:22

## 2025-05-19 RX ADMIN — POLYETHYLENE GLYCOL 3350 17 GRAM(S): 17 POWDER, FOR SOLUTION ORAL at 21:10

## 2025-05-19 RX ADMIN — Medication 400 MILLIGRAM(S): at 15:22

## 2025-05-19 RX ADMIN — Medication 2 TABLET(S): at 21:11

## 2025-05-19 RX ADMIN — KETOROLAC TROMETHAMINE 30 MILLIGRAM(S): 30 INJECTION, SOLUTION INTRAMUSCULAR; INTRAVENOUS at 22:11

## 2025-05-19 NOTE — PHYSICAL THERAPY INITIAL EVALUATION ADULT - BALANCE TRAINING, PT EVAL
GOAL: Pt will improve balance by at least 1/2 grade to decrease fall risk during functional mobility within 2 weeks.

## 2025-05-19 NOTE — CHART NOTE - NSCHARTNOTEFT_GEN_A_CORE
Resting without complaints.  No Chest Pain, SOB, N/V.    T(C): 36.7 (05-19-25 @ 14:30), Max: 36.8 (05-19-25 @ 05:25)  HR: 65 (05-19-25 @ 14:45) (64 - 65)  BP: 128/73 (05-19-25 @ 14:45) (107/69 - 141/77)  RR: 18 (05-19-25 @ 14:30) (16 - 18)  SpO2: 99% (05-19-25 @ 14:45) (97% - 100%)      Exam:  Alert and Sutton, No Acute Distress  Card: +S1/S2, RRR  Pulm: CTAB  Laterality: R Hip  Mepilex c/d/i  Calves soft, non-tender bilaterally  +PF/DF/EHL/FHL  SILT  + DP pulse    Xray: FINDINGS/  IMPRESSION:  Patient is status post right total hip arthroplasty with postsurgical changes in the overlying soft tissues. Hardware is intact. No fracture.    --- End of Report ---        A/P: 64y Female S/p R Total Hip Arthroplasty Anterior Approach. VSS. NAD  -PT/OT-WBAT RLE  -FU AM labs tomorrow  -Ancef while admitted and then to be discharged on Doxycycline x 2 weeks  -IS  -DVT PPx: ASA 81mg Po BID, SCDs, early OOB and ambulation  -Pain Control  -Continue Current Tx  -Dispo planning    Taj Hawkins PA-C  Orthopedic Surgery Team  Team Pager #[phone]

## 2025-05-19 NOTE — DISCHARGE NOTE PROVIDER - NSDCMRMEDTOKEN_GEN_ALL_CORE_FT
atenolol 25 mg oral tablet: 1 tab(s) orally once a day  atenolol 25 mg oral tablet: 1 tab(s) orally once a day  lamoTRIgine 100 mg oral tablet: 1 tab(s) orally once a day (in the morning)  lamoTRIgine 150 mg oral tablet: 1 tab(s) orally once a day (at bedtime)   acetaminophen 500 mg oral tablet: 2 tab(s) orally every 8 hours Continue for 7 days, then as needed  aspirin 81 mg oral delayed release tablet: 1 tab(s) orally every 12 hours Continue for 30 days, then stop  atenolol 25 mg oral tablet: 1 tab(s) orally once a day  lamoTRIgine 100 mg oral tablet: 1 tab(s) orally once a day (in the morning)  lamoTRIgine 150 mg oral tablet: 1 tab(s) orally once a day (at bedtime)  naloxone 4 mg/0.1 mL nasal spray: 1 spray(s) intranasally every 2 minutes as needed for antidote Alternate nostrils  naproxen 500 mg oral tablet: 1 tab(s) orally every 12 hours Continue for 14 days, then stop MDD: 2  oxyCODONE 5 mg oral tablet: 1 tab(s) orally every 4 hours as needed for Moderate Pain (4 - 6) Or 2 tabs orally every 4 hours as needed for severe pain (7-10) MDD: 6  pantoprazole 40 mg oral delayed release tablet: 1 tab(s) orally once a day (before a meal) MDD: 1  polyethylene glycol 3350 oral powder for reconstitution: 17 gram(s) orally once a day (at bedtime) as needed for  constipation  senna leaf extract oral tablet: 2 tab(s) orally once a day (at bedtime)  traMADol 50 mg oral tablet: 1 tab(s) orally every 6 hours as needed for Mild Pain (1 - 3) MDD: 4   acetaminophen 500 mg oral tablet: 2 tab(s) orally every 8 hours Continue for 7 days, then as needed  aspirin 81 mg oral delayed release tablet: 1 tab(s) orally every 12 hours Continue for 30 days, then stop  atenolol 25 mg oral tablet: 1 tab(s) orally once a day  doxycycline hyclate 100 mg oral tablet: 1 tab(s) orally 2 times a day Continue for 2 weeks, then stop  lamoTRIgine 100 mg oral tablet: 1 tab(s) orally once a day (in the morning)  lamoTRIgine 150 mg oral tablet: 1 tab(s) orally once a day (at bedtime)  naloxone 4 mg/0.1 mL nasal spray: 1 spray(s) intranasally every 2 minutes as needed for antidote Alternate nostrils  naproxen 500 mg oral tablet: 1 tab(s) orally every 12 hours Continue for 14 days, then stop MDD: 2  oxyCODONE 5 mg oral tablet: 1 tab(s) orally every 4 hours as needed for Moderate Pain (4 - 6) Or 2 tabs orally every 4 hours as needed for severe pain (7-10) MDD: 6  pantoprazole 40 mg oral delayed release tablet: 1 tab(s) orally once a day (before a meal) MDD: 1  polyethylene glycol 3350 oral powder for reconstitution: 17 gram(s) orally once a day (at bedtime) as needed for  constipation  senna leaf extract oral tablet: 2 tab(s) orally once a day (at bedtime)  traMADol 50 mg oral tablet: 1 tab(s) orally every 6 hours as needed for Mild Pain (1 - 3) MDD: 4   acetaminophen 500 mg oral tablet: 2 tab(s) orally every 8 hours Continue for 7 days, then as needed  aspirin 81 mg oral delayed release tablet: 1 tab(s) orally every 12 hours Continue for 30 days, then stop  atenolol 25 mg oral tablet: 1 tab(s) orally once a day  doxycycline hyclate 100 mg oral tablet: 1 tab(s) orally 2 times a day Continue for 2 weeks, then stop MDD: 2  lamoTRIgine 100 mg oral tablet: 1 tab(s) orally once a day (in the morning)  lamoTRIgine 150 mg oral tablet: 1 tab(s) orally once a day (at bedtime)  naloxone 4 mg/0.1 mL nasal spray: 1 spray(s) intranasally every 2 minutes as needed for antidote Alternate nostrils  naproxen 500 mg oral tablet: 1 tab(s) orally every 12 hours Continue for 14 days, then stop MDD: 2  oxyCODONE 5 mg oral tablet: 1 tab(s) orally every 4 hours as needed for Moderate Pain (4 - 6) Or 2 tabs orally every 4 hours as needed for severe pain (7-10) MDD: 6  pantoprazole 40 mg oral delayed release tablet: 1 tab(s) orally once a day (before a meal) MDD: 1  polyethylene glycol 3350 oral powder for reconstitution: 17 gram(s) orally once a day (at bedtime) as needed for  constipation  senna leaf extract oral tablet: 2 tab(s) orally once a day (at bedtime)  traMADol 50 mg oral tablet: 1 tab(s) orally every 6 hours as needed for Mild Pain (1 - 3) MDD: 4

## 2025-05-19 NOTE — DISCHARGE NOTE PROVIDER - NSDCFUSCHEDAPPT_GEN_ALL_CORE_FT
[facility]   [facility]  Scheduled Appointment: 06/17/2025    [facility]   [facility]  Scheduled Appointment: 06/17/2025    Jamshid Meier  [facility]  ORTHOSURG [address]  Scheduled Appointment: 06/26/2025    Helio Betancur  [facility]  ORTHOSURG [address]  Scheduled Appointment: 06/27/2025    Jos Kaur  [facility]  CARDIOLOGY [address]  Scheduled Appointment: 07/28/2025    [facility]  CARDIOLOGY [address]  Scheduled Appointment: 07/28/2025

## 2025-05-19 NOTE — DISCHARGE NOTE PROVIDER - NSDCFUADDAPPT_GEN_ALL_CORE_FT
Please follow up with Dr. Meier on your scheduled appointment on 6/26/25. Please call office to schedule and confirm your appointment date and time.    Please Follow up with your primary care provider in approximately one month from hospital discharge to discuss your recent surgery/admission and for continuum of care.

## 2025-05-19 NOTE — DISCHARGE NOTE PROVIDER - HOSPITAL COURSE
History of Present Illness	  Patient comes to PST for right total hip arthroplasty direct anterior approach with MD Meier on 5/19/25. Patient has a pmhx of cervical disc, osteoarthritis, bradycardia (Pacemaker 2004- pt had a syncopal episode x2 and was found to have bradycardia and needed emergency pacemaker, since then no episodes, follows with cardiology, battery change 2017), seizures( 2004 and 2007 none since is on Lamictal and follows with neurology), sick sinus syndrome, mild sleep apnea ( does not uses CPAP, has retainers, was tested around 2004).      Patient reports right hip discomfort for about 3 years. Reports over the last year has been worsening. Denies nay  numbness or tingling to leg. Resting alleviates some discomfort. Reports walking causes discomfort and some swelling at times. Denies any injections to hip. Denies any injury or falls. No assistive devices to walk. Denies any other complaints.     Hospital Course:  Patient admitted to [facility] on 5/19/25 for an elective R Total Hip Arthroplasty Anterior Approach with Dr. Meier.  Patient tolerated procedure well with no complications.  Physical Therapy evaluated patient and recommended patient for safe home disposition with outpatient physical therapy recommendations. Remainder of hospital stay unremarkable and patient medically cleared and stable for discharge when PT clears. History of Present Illness	  Patient comes to PST for right total hip arthroplasty direct anterior approach with MD Meier on 25. Patient has a pmhx of cervical disc, osteoarthritis, bradycardia (Pacemaker - pt had a syncopal episode x2 and was found to have bradycardia and needed emergency pacemaker, since then no episodes, follows with cardiology, battery change ), seizures(  and  none since is on Lamictal and follows with neurology), sick sinus syndrome, mild sleep apnea ( does not uses CPAP, has retainers, was tested around ).    Patient reports right hip discomfort for about 3 years. Reports over the last year has been worsening. Denies nay  numbness or tingling to leg. Resting alleviates some discomfort. Reports walking causes discomfort and some swelling at times. Denies any injections to hip. Denies any injury or falls. No assistive devices to walk. Denies any other complaints.     Goal: " To be able to exercise again"     Allergies:  	Diflucan: Drug, Rash  	Duricef: Drug, Unknown, rash       Intolerances:  	morphine: Drug, Vomiting  	sulfa drugs: Drug Category, Unknown, Pt refusing sulfa because her uncle  from it    PAST MEDICAL HISTORY:  -Bradycardia SSS  -Cervical disc displacement   -Endometriosis, Mild   -h/o - left Tennis Elbow   -H/O squamous cell carcinoma excision   -History of seizure   -Hx of sleep apnea - had sleep study done in ?  - does not use CPAP machine uses oral appliance  -Osteoarthritis   -Pacemaker - insertion in    -Radiculopathy of cervical region   -Sick sinus syndrome.     PAST SURGICAL HISTORY:  -h/o cardiac pacemaker insertion -  battery replaced , [employer] Model L331/ serial [id number], interrogated 12/15/2020  -H/O colonoscopy   -History of D&C   -History of D&C - 15 yrs ago   -History of laparoscopy - many yrs ago   -History of skin surgery   -S/P cervical spinal fusion C4-5   -S/P ear surgery s/p wound closure of left ear s/p mechanical fall.     Hospital Course:  Patient admitted to [facility] on 25 for an elective R Total Hip Arthroplasty Anterior Approach with Dr. Meier.    Patient tolerated procedure well with no complications.    Physical Therapy evaluated patient and recommended patient for safe home disposition with outpatient physical therapy recommendations.   Remainder of hospital stay unremarkable and patient medically cleared and stable for discharge when PT clears.

## 2025-05-19 NOTE — PHYSICAL THERAPY INITIAL EVALUATION ADULT - PERTINENT HX OF CURRENT PROBLEM, REHAB EVAL
65yo F with PMHx of cervical disc, osteoarthritis, bradycardia (Pacemaker 2004- pt had a syncopal episode x2 and was found to have bradycardia and needed emergency pacemaker, since then no episodes, follows with cardiology, battery change 2017), seizures (2004 and 2007 none since is on Lamictal and follows with neurology), sick sinus syndrome, mild sleep apnea (does not uses CPAP, has retainers, was tested around 2004). Patient reports right hip discomfort for about 3 years. Reports over the last year has been worsening. Denies any numbness or tingling to leg. Resting alleviates some discomfort. Reports walking causes discomfort and some swelling at times. Denies any injections to hip. Denies any injury or falls. No assistive devices to walk. Denies any other complaints. Patient now s/p right total hip arthroplasty direct anterior approach with MD Meier on 5/19/25.   XR Pelvis 5/19/25: Patient is status post right total hip arthroplasty with postsurgical changes in the overlying soft tissues. Hardware is intact. No fracture.

## 2025-05-19 NOTE — DISCHARGE NOTE PROVIDER - CARE PROVIDER_API CALL
Jamshid Meier  Joint Reconstruction  [address], [address]  [place] [address]  Phone: [phone]  Fax: [phone]  Follow Up Time:

## 2025-05-19 NOTE — PHYSICAL THERAPY INITIAL EVALUATION ADULT - PLANNED THERAPY INTERVENTIONS, PT EVAL
STAIR GOAL: Pt will negotiate 5 steps with HR assist independently within 2 weeks./balance training/bed mobility training/gait training/transfer training

## 2025-05-19 NOTE — PRE-ANESTHESIA EVALUATION ADULT - NSANTHPMHFT_GEN_ALL_CORE
63 y/o F PMH cervical disc disease, osteoarthritis, bradycardia (pacemaker placed 2004), seizures (none since starting Lamictal), mild sleep apnea (uses retainer), sick sinus syndrome to undergo right total hip arthroplasty.

## 2025-05-19 NOTE — DISCHARGE NOTE PROVIDER - NSDCACTIVITY_GEN_ALL_CORE
Do not drive or operate machinery/Showering allowed/Do not make important decisions/Stairs allowed/Walking - Indoors allowed/No heavy lifting/straining/Walking - Outdoors allowed Do not drive or operate machinery/Do not make important decisions/Stairs allowed/Walking - Indoors allowed/No heavy lifting/straining/Walking - Outdoors allowed/Follow Instructions Provided by your Surgical Team

## 2025-05-19 NOTE — PHYSICAL THERAPY INITIAL EVALUATION ADULT - DIAGNOSIS, PT EVAL
[facility] Medicine  Progress Note    Patient Name: Sonia Muse  MRN: [id number]  Patient Class: IP- Inpatient   Admission Date: 4/24/2025  Length of Stay: 2 days  Attending Physician: Peter Garcia MD  Primary Care Provider: Nasra Galvez FNP        Subjective     Principal Problem:Pancreatic mass        HPI:  64 year old female with comorbid conditions of hypertension, asthma, degenerative disc disease, anxiety/depression, fibromyalgia, neuromuscular disorder presented to the ED due to progressive generalized weakness for a few days.    Reports prolonged illness 3 weeks ago (COVID?) and had coughing bouts and fatigue since.  Reports intermittent crampy abdominal pain which she attributed to the cough.  Noticed yesterday that her eyes were yellow colored and stool was light.  Associated with generalized itching without a rash.  Denies fevers, blood in stool, dysuria, frequency, numbness/weakness, orthopnea, headache, visual changes, pedal edema.    In ED patient has icterus, RUQ tenderness, appeared jaundiced.  Labs revealed T Bili of 9.2, transaminitis, MIGUEL ANGEL with Cr 1.6. US abdomen revealed 5.0x3.7cm pancreatic head mass, biliary ductal dilation to 4mm however CT abdomen was negative.  GI was notified (Dr. Escobar) - recommended MRI pancreatic protocol and possible ERCP tomorrow.  Surgery team notified (Dr. Nunn)    Overview/Hospital Course:  64 y.o. female with a history of hypertension, asthma, depression came in with jaundice of skin and eyes, dark urine, malaise which she associated with recent COVID. MRCP showed pancreatic mass highly concerning for malignancy with biliary obstruction with concerns for splenic metastasis and peripancreatic lymph node involvement. Surgery and GI were consulted & plan for ERCP with biopsy and stent placement on 4/25. PET-CT recommended outpatient.  Palliative and heme oncology were consulted.  For MIGUEL ANGEL likely prerenal from  dehydration , given fluid hydration and monitored closely and held nephrotoxic home blood pressure meds.  MIGUEL ANGEL resolved. Her symptoms significantly improved as well has jaundice following ERCP. Bilirubin down trended. Lipase was slight up, but patient is tolerating diet.     Interval History: Patient is feeling lot better. Lot of family member and friends at the bedside. Patient states she feels like she can go back to work. Abdominal pain today. No nausea or vomiting. Has pasty stools. Lipase went up slightly today. Discussed with oncology and GI.     Review of Systems  Objective:     Vital Signs (Most Recent):  Temp: 98.1 °F (36.7 °C) (04/26/25 1222)  Pulse: 87 (04/26/25 1222)  Resp: 18 (04/26/25 1222)  BP: 126/84 (04/26/25 1222)  SpO2: (!) 94 % (04/26/25 1222) Vital Signs (24h Range):  Temp:  [97.6 °F (36.4 °C)-98.8 °F (37.1 °C)] 98.1 °F (36.7 °C)  Pulse:  [74-89] 87  Resp:  [13-20] 18  SpO2:  [91 %-99 %] 94 %  BP: ()/(51-88) 126/84     Weight: 120.7 kg (266 lb)  Body mass index is 45.66 kg/m².    Intake/Output Summary (Last 24 hours) at 4/26/2025 1428  Last data filed at 4/26/2025 0923  Gross per 24 hour   Intake 1180 ml   Output 3 ml   Net 1177 ml         Physical Exam  Vitals and nursing note reviewed.   Constitutional:       General: She is not in acute distress.     Appearance: She is obese. She is not toxic-appearing.   HENT:      Head: Atraumatic.      Mouth/Throat:      Mouth: Mucous membranes are moist.      Pharynx: Oropharynx is clear.   Eyes:      General: No scleral icterus.     Conjunctiva/sclera: Conjunctivae normal.      Pupils: Pupils are equal, round, and reactive to light.   Cardiovascular:      Rate and Rhythm: Normal rate and regular rhythm.      Heart sounds: No murmur heard.  Pulmonary:      Effort: No respiratory distress.      Breath sounds: No wheezing, rhonchi or rales.   Abdominal:      General: Abdomen is flat. Bowel sounds are normal.      Palpations: Abdomen is soft.    Musculoskeletal:         General: No swelling or deformity.      Cervical back: No rigidity or tenderness.   Skin:     Coloration: Skin is jaundiced. Skin is not pale.      Findings: No bruising, erythema or rash.   Neurological:      General: No focal deficit present.      Mental Status: She is alert and oriented to person, place, and time.      Cranial Nerves: No cranial nerve deficit.      Sensory: No sensory deficit.      Motor: No weakness.   Psychiatric:         Mood and Affect: Mood normal.         Behavior: Behavior normal.               Significant Labs: All pertinent labs within the past 24 hours have been reviewed.  CBC:   Recent Labs   Lab 04/24/25  1636 04/25/25  0751 04/26/25  0547   WBC 7.20 7.36 6.14   HGB 12.7 11.8* 11.2*   HCT 39.7 36.8* 36.4*    361 314     CMP:   Recent Labs   Lab 04/24/25  1636 04/25/25  0751 04/26/25  0547   * 131*  131* 134*   K 4.9 3.5  3.5 4.0   CO2 25 25  25 24   BUN 25* 23  23 25*   CREATININE 1.6* 1.3  1.3 1.2   CALCIUM 9.7 9.2  9.2 8.6*   ALBUMIN 4.0 3.5 3.3*   BILITOT 9.2* 10.9* 3.6*   ALKPHOS 962* 900* 800*   * 228* 79*   * 275* 197*       Significant Imaging: I have reviewed all pertinent imaging results/findings within the past 24 hours.      Assessment & Plan  Pancreatic mass  MRCP showed pancreatic mass highly concerning for malignancy with biliary obstruction with concerns for splenic metastasis and peripancreatic lymph node involvement.  Surgery and GI were consulted & plan for ERCP with biopsy and stent placement on 4/25.   PET-CT recommended outpatient.    Discussed with GI and oncology  Oncology will schedule outpatient appt with preliminary biopsy results end of the week   GI planning for outpatient EUS and biopsy 4/29 and ERCP 5/1 outpatient   Patient can be discharged home tomorrow once lipase down trending   MIGUEL ANGEL (acute kidney injury)  MIGUEL ANGEL is likely due to  dehydration . Baseline creatinine is  0.9 . Most recent creatinine  and eGFR are listed below.  Recent Labs     04/24/25  1636 04/25/25  0751 04/26/25  0547   CREATININE 1.6* 1.3  1.3 1.2   EGFRNORACEVR 36* 47*  47* 51*      Plan  - Avoid nephrotoxins and renally dose meds for GFR listed above  - Monitor urine output, serial BMP, and adjust therapy as needed  - DC IVF. Encourage PO intake   - Cont to hold losartan   Essential hypertension  Patient's blood pressure range in the last 24 hours was: BP  Min: 88/55  Max: 166/68.The patient's inpatient anti-hypertensive regimen is listed below:  Current Antihypertensives  metoprolol succinate (TOPROL-XL) 24 hr tablet 25 mg, Daily, Oral  amLODIPine tablet 5 mg, Daily, Oral    Plan  -cont metoprolol, resume Norvasc. Cont to hold losartan     Depression with anxiety  Patient has persistent depression which is unknown and is currently controlled. Will Continue anti-depressant medications. We will not consult psychiatry at this time. Patient does not display psychosis at this time. Continue to monitor closely and adjust plan of care as needed.      Fibromyalgia  Pain control PRN     Hyperglycemia  Newly diagnosed DM in a setting of pancreatic mass   Started Lantus 10 units today   Cont sliding scale         VTE Risk Mitigation (From admission, onward)           Ordered     enoxaparin injection 40 mg  Every 12 hours         04/24/25 2228     IP VTE HIGH RISK PATIENT  Once         04/24/25 2222     Place sequential compression device  Until discontinued         04/24/25 2222                    Discharge Planning   DENZEL: 4/27/2025     Code Status: Full Code   Medical Readiness for Discharge Date:   Discharge Plan A: Home with family                        Peter Garcia MD  Department of Hospital Medicine   [facility]     Pt presents with postop hip pain, impairments in R hip ROM, strength, balance, endurance all impacting ability to perform ADLs and functional mobility

## 2025-05-19 NOTE — CONSULT NOTE ADULT - TIME BILLING
Preparation to see the patient including review of labs and prior notes, performing a physical exam, counseling and educating the patient, ordering medications and tests, documenting the note, and coordinating care.

## 2025-05-19 NOTE — PHYSICAL THERAPY INITIAL EVALUATION ADULT - ACTIVE RANGE OF MOTION EXAMINATION, REHAB EVAL
AAROM R LE WFL/bilateral upper extremity Active ROM was WFL (within functional limits)/bilateral  lower extremity Active ROM was WFL (within functional limits)

## 2025-05-19 NOTE — OCCUPATIONAL THERAPY INITIAL EVALUATION ADULT - BED MOBILITY TRAINING, PT EVAL
GOAL: Pt will perform bed mobility independently within 4 weeks. GOAL: Pt will perform bed mobility independently within 2 weeks.

## 2025-05-19 NOTE — OCCUPATIONAL THERAPY INITIAL EVALUATION ADULT - TRANSFER TRAINING, PT EVAL
GOAL: Pt will independently perform functional transfers within 4 weeks. GOAL: Pt will independently perform functional transfers within 2 weeks.

## 2025-05-19 NOTE — PATIENT PROFILE ADULT - AVIAN FLU SYMPTOMS
11:31 am Gonzalo Salter called [facility] to clarify instructions on Camacho's Potassium Chloride 20 MEQ, Take 20 mEq by mouth daily. Stop taking on:  February 5, 2020. Gonzalo Salter was inquiring why is [place] to stop taking the Potassium Chloride on 2/5/2020.  [facility] informed Ba No

## 2025-05-19 NOTE — OCCUPATIONAL THERAPY INITIAL EVALUATION ADULT - ADL RETRAINING, OT EVAL
GOAL: Pt will perform LB dressing independently within 4 weeks. GOAL: Pt will perform LB dressing independently within 2 weeks.

## 2025-05-19 NOTE — OCCUPATIONAL THERAPY INITIAL EVALUATION ADULT - PERTINENT HX OF CURRENT PROBLEM, REHAB EVAL
Patient now s/p right total hip arthroplasty direct anterior approach with MD Meier on 5/19/25. Patient has a pmhx of cervical disc, osteoarthritis, bradycardia (Pacemaker 2004- pt had a syncopal episode x2 and was found to have bradycardia and needed emergency pacemaker, since then no episodes, follows with cardiology, battery change 2017), seizures (2004 and 2007 none since is on Lamictal and follows with neurology), sick sinus syndrome, mild sleep apnea (does not uses CPAP, has retainers, was tested around 2004). Patient reports right hip discomfort for about 3 years. Reports over the last year has been worsening. Denies any numbness or tingling to leg. Resting alleviates some discomfort. Reports walking causes discomfort and some swelling at times. Denies any injections to hip. Denies any injury or falls. No assistive devices to walk. Denies any other complaints.  XR Pelvis 5/19: Patient is status post right total hip arthroplasty with postsurgical changes in the overlying soft tissues. Hardware is intact. No fracture.

## 2025-05-19 NOTE — OCCUPATIONAL THERAPY INITIAL EVALUATION ADULT - BALANCE TRAINING, PT EVAL
GOAL: Pt will demonstrate good dynamic standing balance during ADL tasks within 4 weeks. GOAL: Pt will demonstrate good dynamic standing balance during ADL tasks within 2 weeks.

## 2025-05-19 NOTE — CONSULT NOTE ADULT - SUBJECTIVE AND OBJECTIVE BOX
Authored by Dr Latasha Eaton  After hours or if no response please page [phone]    PMD:     Patient is a 64y old  Female who presents with a chief complaint of " right hip pain"  Goal: " To be able to exercise again" (01 May 2025 07:57)      HPI:  Patient comes to PST for right total hip arthroplasty direct anterior approach with MD Meier on 5/19/25. Patient has a pmhx of cervical disc, osteoarthritis, bradycardia (Pacemaker 2004- pt had a syncopal episode x2 and was found to have bradycardia and needed emergency pacemaker, since then no episodes, follows with cardiology, battery change 2017), seizures( 2004 and 2007 none since is on Lamictal and follows with neurology), sick sinus syndrome, mild sleep apnea ( does not uses CPAP, has retainers, was tested around 2004).      Patient reports right hip discomfort for about 3 years. Reports over the last year has been worsening. Denies nay  numbness or tingling to leg. Resting alleviates some discomfort. Reports walking causes discomfort and some swelling at times. Denies any injections to hip. Denies any injury or falls. No assistive devices to walk. Denies any other complaints.  (01 May 2025 07:57)    Feels well, currentl postop in PACU. No complaints.    Allergies    Duricef (Unknown)  Diflucan (Rash)    Intolerances    morphine (Vomiting)  sulfa drugs (Unknown)      HOME MEDICATIONS:   Home Medications:  atenolol 25 mg oral tablet: 1 tab(s) orally once a day (19 May 2025 05:37)  atenolol 25 mg oral tablet: 1 tab(s) orally once a day (19 May 2025 08:24)  lamoTRIgine 100 mg oral tablet: 1 tab(s) orally once a day (in the morning) (19 May 2025 05:37)  lamoTRIgine 150 mg oral tablet: 1 tab(s) orally once a day (at bedtime) (19 May 2025 05:37)      MEDICATIONS  (STANDING):  acetaminophen   IVPB .. 1000 milliGRAM(s) IV Intermittent once  acetaminophen   IVPB .. 1000 milliGRAM(s) IV Intermittent once  aspirin enteric coated 81 milliGRAM(s) Oral every 12 hours  atenolol  Tablet 25 milliGRAM(s) Oral daily  ceFAZolin   IVPB 2000 milliGRAM(s) IV Intermittent every 8 hours  ketorolac   Injectable 30 milliGRAM(s) IV Push every 6 hours  lamoTRIgine 100 milliGRAM(s) Oral daily  lamoTRIgine 150 milliGRAM(s) Oral at bedtime  pantoprazole    Tablet 40 milliGRAM(s) Oral before breakfast  polyethylene glycol 3350 17 Gram(s) Oral at bedtime  senna 2 Tablet(s) Oral at bedtime  sodium chloride 0.9% Bolus 500 milliLiter(s) IV Bolus once    MEDICATIONS  (PRN):  magnesium hydroxide Suspension 30 milliLiter(s) Oral daily PRN Constipation  ondansetron Injectable 4 milliGRAM(s) IV Push every 6 hours PRN Nausea and/or Vomiting  oxyCODONE    IR 5 milliGRAM(s) Oral every 4 hours PRN Moderate Pain (4 - 6)  oxyCODONE    IR 10 milliGRAM(s) Oral every 4 hours PRN Severe Pain (7 - 10)  traMADol 50 milliGRAM(s) Oral every 6 hours PRN Mild Pain (1 - 3)      PAST MEDICAL & SURGICAL HISTORY:  Pacemaker - insertion in 2004      Endometriosis, Mild      h/o - left Tennis Elbow      Hx of sleep apnea - had sleep study done in ? 2005 - does not use CPAP machine  uses oral appliance      Bradycardia  SSS      Cervical disc displacement      Radiculopathy of cervical region      Sick sinus syndrome      History of seizure      Osteoarthritis      H/O squamous cell carcinoma excision      History of D&C - 15 yrs ago      History of laparoscopy - many yrs ago      h/o cardiac pacemaker insertion - 2004  battery replaced 2017, [employer] Model L331/ serial [id number], interrogated 12/15/2020      H/O colonoscopy      History of D&C  2012      S/P cervical spinal fusion  C4-5 2019      S/P ear surgery  s/p wound closure of left ear s/p mechanical fall      History of skin surgery          SOCIAL HISTORY: non-smoker, rare alcohol use    FAMILY HISTORY:  FH: myocardial infarction (Sibling)    FH: stroke (Sibling)    FH: aortic aneurysm (Father)    REVIEW OF SYSTEMS:    CONSTITUTIONAL: No weakness, weight loss, fevers or chills  EYES/ENT: No visual changes;  No vertigo or throat pain   NECK: No pain or stiffness  RESPIRATORY: No cough, wheezing, hemoptysis; No shortness of breath  CARDIOVASCULAR: No chest pain or palpitations, VALENCIA  GASTROINTESTINAL: No abdominal or epigastric pain. No nausea, vomiting, or hematemesis; No diarrhea or constipation. No melena or hematochezia.  GENITOURINARY: No dysuria, frequency or hematuria  NEUROLOGICAL: No numbness or weakness, AAOX3  SKIN: No itching, rashes  MUSCULOSKELETAL: no joint erythema, no joint swelling  PSYCHIATRIC: no depression, no anxiety    Vital Signs Last 24 Hrs  T(C): 36.7 (19 May 2025 14:30), Max: 36.8 (19 May 2025 05:25)  T(F): 98 (19 May 2025 14:30), Max: 98.2 (19 May 2025 05:25)  HR: 65 (19 May 2025 14:45) (64 - 65)  BP: 128/73 (19 May 2025 14:45) (107/69 - 141/77)  BP(mean): 91 (19 May 2025 12:00) (87 - 103)  RR: 18 (19 May 2025 14:30) (16 - 18)  SpO2: 99% (19 May 2025 14:45) (97% - 100%)    Parameters below as of 19 May 2025 14:45  Patient On (Oxygen Delivery Method): room air        PHYSICAL EXAM:    CONSTITUTIONAL: Well-groomed, in no apparent distress  EYES: No conjunctival or scleral injection, non-icteric; PERRLA and symmetric  ENMT: No external nasal lesions; no pharyngeal injection or exudates, oral mucosa with moist membranes  NECK: Trachea midline without palpable neck mass; thyroid not enlarged and non-tender  RESPIRATORY: Breathing comfortably; lungs CTA without wheeze/rhonchi/rales  CARDIOVASCULAR: +S1S2, RRR, no M/G/R; pedal pulses full and symmetric; no lower extremity edema  GASTROINTESTINAL: No palpable masses or tenderness, +BS throughout, no rebound/guarding  MUSCULOSKELETAL: no digital clubbing or cyanosis; no paraspinal tenderness;   SKIN: No rashes or ulcers noted; no subcutaneous nodules or induration palpable  PSYCHIATRIC: A+O x 3; mood and affect appropriate; appropriate insight and judgment    LABS:        CAPILLARY BLOOD GLUCOSE      POCT Blood Glucose.: 89 mg/dL (19 May 2025 05:50)

## 2025-05-19 NOTE — PHYSICAL THERAPY INITIAL EVALUATION ADULT - ADDITIONAL COMMENTS
Pt resides in private style ranch home with 2-3 steps to enter and first floor setup. Pt functionally independent in all aspects of mobility and self care prior to admission, reports her spouse will be able to assist as needed. Pt owns RW for home discharge. (+) works and drives

## 2025-05-19 NOTE — OCCUPATIONAL THERAPY INITIAL EVALUATION ADULT - ADDITIONAL COMMENTS
Pt lives with spouse in ranch style private home +2-3 stairs to enter and first floor setup. PTA pt was indep in ADL/amb/working. Pt owns RW, tub shower.

## 2025-05-19 NOTE — PATIENT PROFILE ADULT - FALL HARM RISK - UNIVERSAL INTERVENTIONS
Bed in lowest position, wheels locked, appropriate side rails in place/Call bell, personal items and telephone in reach/Instruct patient to call for assistance before getting out of bed or chair/Non-slip footwear when patient is out of bed/[place] to call system/Physically safe environment - no spills, clutter or unnecessary equipment/Purposeful Proactive Rounding/Room/bathroom lighting operational, light cord in reach

## 2025-05-19 NOTE — DISCHARGE NOTE PROVIDER - NSDCFUADDINST_GEN_ALL_CORE_FT
Please keep dressing clean and intact, Incision/dressing care to be performed in office follow up visit.  Please continue to take your Aspirin for a total of 4 weeks for blood clot prevention. Please call Dr. Meier's office within next few days to schedule a follow up appointment about 14 days after surgery. (Unless already scheduled)  Please follow Dr. Meier's instruction sheet  Recommend follow up with Medical MD within next 4 weeks.  Dressing should be removed per date on dressing.   Patient may shower after 3 days, limit direct water to dressing, if wet pat dry   Out of bed, ambulate, weight bearing as tolerated-   Physical therapy to assist with exercise and help increase endurance.   Please contact Doctors office regarding arrangements for out patient Physical therapy.

## 2025-05-20 ENCOUNTER — TRANSCRIPTION ENCOUNTER (OUTPATIENT)
Age: 65
End: 2025-05-20

## 2025-05-20 VITALS
RESPIRATION RATE: 18 BRPM | SYSTOLIC BLOOD PRESSURE: 134 MMHG | OXYGEN SATURATION: 98 % | DIASTOLIC BLOOD PRESSURE: 72 MMHG | HEART RATE: 64 BPM | TEMPERATURE: 99 F

## 2025-05-20 LAB
ANION GAP SERPL CALC-SCNC: 12 MMOL/L — SIGNIFICANT CHANGE UP (ref 5–17)
BUN SERPL-MCNC: 15 MG/DL — SIGNIFICANT CHANGE UP (ref 7–23)
CALCIUM SERPL-MCNC: 9 MG/DL — SIGNIFICANT CHANGE UP (ref 8.4–10.5)
CHLORIDE SERPL-SCNC: 107 MMOL/L — SIGNIFICANT CHANGE UP (ref 96–108)
CO2 SERPL-SCNC: 22 MMOL/L — SIGNIFICANT CHANGE UP (ref 22–31)
CREAT SERPL-MCNC: 0.89 MG/DL — SIGNIFICANT CHANGE UP (ref 0.5–1.3)
EGFR: 73 ML/MIN/1.73M2 — SIGNIFICANT CHANGE UP
EGFR: 73 ML/MIN/1.73M2 — SIGNIFICANT CHANGE UP
GLUCOSE SERPL-MCNC: 136 MG/DL — HIGH (ref 70–99)
HCT VFR BLD CALC: 33.1 % — LOW (ref 34.5–45)
HGB BLD-MCNC: 10.9 G/DL — LOW (ref 11.5–15.5)
MCHC RBC-ENTMCNC: 31.2 PG — SIGNIFICANT CHANGE UP (ref 27–34)
MCHC RBC-ENTMCNC: 32.9 G/DL — SIGNIFICANT CHANGE UP (ref 32–36)
MCV RBC AUTO: 94.8 FL — SIGNIFICANT CHANGE UP (ref 80–100)
NRBC BLD AUTO-RTO: 0 /100 WBCS — SIGNIFICANT CHANGE UP (ref 0–0)
PLATELET # BLD AUTO: 211 K/UL — SIGNIFICANT CHANGE UP (ref 150–400)
POTASSIUM SERPL-MCNC: 3.8 MMOL/L — SIGNIFICANT CHANGE UP (ref 3.5–5.3)
POTASSIUM SERPL-SCNC: 3.8 MMOL/L — SIGNIFICANT CHANGE UP (ref 3.5–5.3)
RBC # BLD: 3.49 M/UL — LOW (ref 3.8–5.2)
RBC # FLD: 12.6 % — SIGNIFICANT CHANGE UP (ref 10.3–14.5)
SODIUM SERPL-SCNC: 141 MMOL/L — SIGNIFICANT CHANGE UP (ref 135–145)
WBC # BLD: 8.87 K/UL — SIGNIFICANT CHANGE UP (ref 3.8–10.5)
WBC # FLD AUTO: 8.87 K/UL — SIGNIFICANT CHANGE UP (ref 3.8–10.5)

## 2025-05-20 PROCEDURE — 97530 THERAPEUTIC ACTIVITIES: CPT

## 2025-05-20 PROCEDURE — 80048 BASIC METABOLIC PNL TOTAL CA: CPT

## 2025-05-20 PROCEDURE — 97535 SELF CARE MNGMENT TRAINING: CPT

## 2025-05-20 PROCEDURE — 72170 X-RAY EXAM OF PELVIS: CPT

## 2025-05-20 PROCEDURE — 97116 GAIT TRAINING THERAPY: CPT

## 2025-05-20 PROCEDURE — C1776: CPT

## 2025-05-20 PROCEDURE — 97161 PT EVAL LOW COMPLEX 20 MIN: CPT

## 2025-05-20 PROCEDURE — 99232 SBSQ HOSP IP/OBS MODERATE 35: CPT

## 2025-05-20 PROCEDURE — 85027 COMPLETE CBC AUTOMATED: CPT

## 2025-05-20 PROCEDURE — 82962 GLUCOSE BLOOD TEST: CPT

## 2025-05-20 PROCEDURE — 97165 OT EVAL LOW COMPLEX 30 MIN: CPT

## 2025-05-20 RX ORDER — ACETAMINOPHEN 500 MG/5ML
2 LIQUID (ML) ORAL
Qty: 0 | Refills: 0 | DISCHARGE
Start: 2025-05-20

## 2025-05-20 RX ORDER — NAPROXEN SODIUM 275 MG
1 TABLET ORAL
Qty: 28 | Refills: 0
Start: 2025-05-20 | End: 2025-06-02

## 2025-05-20 RX ORDER — NALOXONE HYDROCHLORIDE 0.4 MG/ML
1 INJECTION, SOLUTION INTRAMUSCULAR; INTRAVENOUS; SUBCUTANEOUS
Qty: 1 | Refills: 0
Start: 2025-05-20

## 2025-05-20 RX ORDER — LISINOPRIL 30 MG/1
1 TABLET ORAL
Refills: 0 | DISCHARGE

## 2025-05-20 RX ORDER — POLYETHYLENE GLYCOL 3350 17 G/17G
17 POWDER, FOR SOLUTION ORAL
Qty: 0 | Refills: 0 | DISCHARGE
Start: 2025-05-20

## 2025-05-20 RX ORDER — SENNA 187 MG
2 TABLET ORAL
Qty: 0 | Refills: 0 | DISCHARGE
Start: 2025-05-20

## 2025-05-20 RX ORDER — ASPIRIN 325 MG
1 TABLET ORAL
Qty: 60 | Refills: 0
Start: 2025-05-20 | End: 2025-06-18

## 2025-05-20 RX ORDER — DOXYCYCLINE HYCLATE 100 MG
1 TABLET ORAL
Qty: 0 | Refills: 0 | DISCHARGE

## 2025-05-20 RX ORDER — TRAMADOL HYDROCHLORIDE 50 MG/1
1 TABLET, FILM COATED ORAL
Qty: 28 | Refills: 0
Start: 2025-05-20 | End: 2025-05-26

## 2025-05-20 RX ORDER — OXYCODONE HYDROCHLORIDE 30 MG/1
1 TABLET ORAL
Qty: 42 | Refills: 0
Start: 2025-05-20 | End: 2025-05-26

## 2025-05-20 RX ORDER — DOXYCYCLINE HYCLATE 100 MG
1 TABLET ORAL
Qty: 28 | Refills: 0
Start: 2025-05-20 | End: 2025-06-02

## 2025-05-20 RX ADMIN — KETOROLAC TROMETHAMINE 30 MILLIGRAM(S): 30 INJECTION, SOLUTION INTRAMUSCULAR; INTRAVENOUS at 01:08

## 2025-05-20 RX ADMIN — Medication 40 MILLIGRAM(S): at 05:29

## 2025-05-20 RX ADMIN — LAMOTRIGINE 100 MILLIGRAM(S): 150 TABLET ORAL at 11:16

## 2025-05-20 RX ADMIN — TRAMADOL HYDROCHLORIDE 50 MILLIGRAM(S): 50 TABLET, FILM COATED ORAL at 12:27

## 2025-05-20 RX ADMIN — DEXAMETHASONE 101.6 MILLIGRAM(S): 0.5 TABLET ORAL at 05:29

## 2025-05-20 RX ADMIN — Medication 500 MILLILITER(S): at 05:27

## 2025-05-20 RX ADMIN — KETOROLAC TROMETHAMINE 30 MILLIGRAM(S): 30 INJECTION, SOLUTION INTRAMUSCULAR; INTRAVENOUS at 06:22

## 2025-05-20 RX ADMIN — TRAMADOL HYDROCHLORIDE 50 MILLIGRAM(S): 50 TABLET, FILM COATED ORAL at 12:57

## 2025-05-20 RX ADMIN — Medication 81 MILLIGRAM(S): at 05:29

## 2025-05-20 RX ADMIN — KETOROLAC TROMETHAMINE 30 MILLIGRAM(S): 30 INJECTION, SOLUTION INTRAMUSCULAR; INTRAVENOUS at 02:08

## 2025-05-20 RX ADMIN — LISINOPRIL 25 MILLIGRAM(S): 30 TABLET ORAL at 05:29

## 2025-05-20 RX ADMIN — Medication 100 MILLIGRAM(S): at 06:22

## 2025-05-20 RX ADMIN — Medication 400 MILLIGRAM(S): at 06:22

## 2025-05-20 NOTE — PROGRESS NOTE ADULT - PROBLEM SELECTOR PLAN 3
cont home atenolol  s/p ppm  BP mostly well controlled, sometimes SBP in the 90s, asymptomatic though

## 2025-05-20 NOTE — DISCHARGE NOTE NURSING/CASE MANAGEMENT/SOCIAL WORK - PATIENT PORTAL LINK FT
You can access the FollowMyHealth Patient Portal offered by [facility] by registering at the following website: [email or web address]. By joining [employer]’s FollowMyHealth portal, you will also be able to view your health information using other applications (apps) compatible with our system.

## 2025-05-20 NOTE — PROVIDER CONTACT NOTE (OTHER) - ASSESSMENT
A&Ox4. VSS except BP 96/53. Pt has no complaints at this time &  Pt educated on increasing PO intake. Pt stable, no other concerns.

## 2025-05-20 NOTE — PROGRESS NOTE ADULT - ASSESSMENT
Impression: Stable       Plan:   Continue present treatment                 Out of bed, ambulate, weight bearing as tolerated                 Physical therapy follow up                 Continue to monitor    Cayden Pennington PA-C  Orthopaedic Surgery  Team pager [phone]  [phone]   
64F w/pmh sick sinus syndrome s/p PPM, HTN, and seizure disorder presents for R ALETHEA.

## 2025-05-20 NOTE — DISCHARGE NOTE NURSING/CASE MANAGEMENT/SOCIAL WORK - FINANCIAL ASSISTANCE
[facility] provides services at a reduced cost to those who are determined to be eligible through [facility]’s financial assistance program. Information regarding [facility]’s financial assistance program can be found by going to [email or web address] or by calling [phone].

## 2025-05-20 NOTE — PROVIDER CONTACT NOTE (OTHER) - ACTION/TREATMENT ORDERED:
MD made aware, stated if pt is resting comfortably BP is ok, also stated pt is to receive bolus in am. Plan of care continues.

## 2025-05-20 NOTE — PROGRESS NOTE ADULT - SUBJECTIVE AND OBJECTIVE BOX
ORTHO  Patient is a 64y old  Female who presents with a chief complaint of R Hip Arthroplasty (19 May 2025 17:20)    Pt. resting without complaint    VS-  T(C): 36.7 (05-20-25 @ 03:58), Max: 36.8 (05-19-25 @ 06:20)  HR: 64 (05-20-25 @ 03:58) (61 - 66)  BP: 109/66 (05-20-25 @ 03:58) (96/53 - 141/77)  RR: 18 (05-20-25 @ 03:58) (16 - 18)  SpO2: 97% (05-20-25 @ 03:58) (96% - 100%)  Wt(kg): --    M.S. A&O  Extremity- Right hip- Mepilex- dressing C/D/I  Neuro-              Motor-(+)Ankle- DF/PF              Sensation- grossly intact to light touch              Calves- soft, nontender- PAS                               10.9   8.87  )-----------( 211      ( 20 May 2025 05:39 )             33.1     05-20    141  |  107  |  15  ----------------------------<  136[H]  3.8   |  22  |  0.89    Ca    9.0      20 May 2025 05:39                            
[facility] Division of Hospital Medicine  Latasha Eaton DO  Available on Teams Patricia    Patient is a 64y old  Female who presents with a chief complaint of Right arthritic hip pain/ Right total hip replacement (20 May 2025 06:18)      SUBJECTIVE / OVERNIGHT EVENTS: none. Feels well, has some pain after working with PT today.  ADDITIONAL REVIEW OF SYSTEMS: negative    MEDICATIONS  (STANDING):  acetaminophen     Tablet .. 1000 milliGRAM(s) Oral every 8 hours  aspirin enteric coated 81 milliGRAM(s) Oral every 12 hours  atenolol  Tablet 25 milliGRAM(s) Oral daily  ceFAZolin   IVPB 2000 milliGRAM(s) IV Intermittent every 8 hours  lamoTRIgine 100 milliGRAM(s) Oral daily  lamoTRIgine 150 milliGRAM(s) Oral at bedtime  naproxen 500 milliGRAM(s) Oral every 12 hours  pantoprazole    Tablet 40 milliGRAM(s) Oral before breakfast  polyethylene glycol 3350 17 Gram(s) Oral at bedtime  senna 2 Tablet(s) Oral at bedtime    MEDICATIONS  (PRN):  magnesium hydroxide Suspension 30 milliLiter(s) Oral daily PRN Constipation  ondansetron Injectable 4 milliGRAM(s) IV Push every 6 hours PRN Nausea and/or Vomiting  oxyCODONE    IR 5 milliGRAM(s) Oral every 4 hours PRN Moderate Pain (4 - 6)  oxyCODONE    IR 10 milliGRAM(s) Oral every 4 hours PRN Severe Pain (7 - 10)  traMADol 50 milliGRAM(s) Oral every 6 hours PRN Mild Pain (1 - 3)      CAPILLARY BLOOD GLUCOSE        I&O's Summary    19 May 2025 07:01  -  20 May 2025 07:00  --------------------------------------------------------  IN: 860 mL / OUT: 500 mL / NET: 360 mL    20 May 2025 07:01  -  20 May 2025 12:08  --------------------------------------------------------  IN: 240 mL / OUT: 0 mL / NET: 240 mL        PHYSICAL EXAM:  Vital Signs Last 24 Hrs  T(C): 37 (20 May 2025 09:49), Max: 37 (20 May 2025 09:49)  T(F): 98.6 (20 May 2025 09:49), Max: 98.6 (20 May 2025 09:49)  HR: 64 (20 May 2025 09:49) (61 - 66)  BP: 134/72 (20 May 2025 09:49) (96/53 - 134/72)  BP(mean): --  RR: 18 (20 May 2025 09:49) (16 - 18)  SpO2: 98% (20 May 2025 09:49) (96% - 99%)    Parameters below as of 20 May 2025 09:49  Patient On (Oxygen Delivery Method): room air      CONSTITUTIONAL: Well-groomed, in no apparent distress  EYES: No conjunctival or scleral injection, non-icteric; PERRLA and symmetric  ENMT: No external nasal lesions; no pharyngeal injection or exudates, oral mucosa with moist membranes  NECK: Trachea midline without palpable neck mass; thyroid not enlarged and non-tender  RESPIRATORY: Breathing comfortably; lungs CTA without wheeze/rhonchi/rales  CARDIOVASCULAR: +S1S2, RRR, no M/G/R; pedal pulses full and symmetric; no lower extremity edema  GASTROINTESTINAL: No palpable masses or tenderness, +BS throughout, no rebound/guarding  MUSCULOSKELETAL: no digital clubbing or cyanosis; no paraspinal tenderness;   SKIN: No rashes or ulcers noted; no subcutaneous nodules or induration palpable  PSYCHIATRIC: A+O x 3; mood and affect appropriate; appropriate insight and judgment    LABS:                        10.9   8.87  )-----------( 211      ( 20 May 2025 05:39 )             33.1     05-20    141  |  107  |  15  ----------------------------<  136[H]  3.8   |  22  |  0.89    Ca    9.0      20 May 2025 05:39            Urinalysis Basic - ( 20 May 2025 05:39 )    Color: x / Appearance: x / SG: x / pH: x  Gluc: 136 mg/dL / Ketone: x  / Bili: x / Urobili: x   Blood: x / Protein: x / Nitrite: x   Leuk Esterase: x / RBC: x / WBC x   Sq Epi: x / Non Sq Epi: x / Bacteria: x

## 2025-05-21 ENCOUNTER — TRANSCRIPTION ENCOUNTER (OUTPATIENT)
Age: 65
End: 2025-05-21

## 2025-05-21 ENCOUNTER — NON-APPOINTMENT (OUTPATIENT)
Age: 65
End: 2025-05-21

## 2025-05-23 ENCOUNTER — TRANSCRIPTION ENCOUNTER (OUTPATIENT)
Age: 65
End: 2025-05-23

## 2025-05-23 DIAGNOSIS — R11.0 NAUSEA: ICD-10-CM

## 2025-05-23 RX ORDER — ONDANSETRON 4 MG/1
4 TABLET ORAL 3 TIMES DAILY
Qty: 10 | Refills: 0 | Status: ACTIVE | COMMUNITY
Start: 2025-05-23 | End: 1900-01-01

## 2025-05-29 ENCOUNTER — NON-APPOINTMENT (OUTPATIENT)
Age: 65
End: 2025-05-29

## 2025-05-30 PROBLEM — Z96.641 STATUS POST TOTAL REPLACEMENT OF RIGHT HIP: Status: ACTIVE | Noted: 2025-05-30

## 2025-06-02 ENCOUNTER — APPOINTMENT (OUTPATIENT)
Dept: ORTHOPEDIC SURGERY | Facility: CLINIC | Age: 65
End: 2025-06-02
Payer: COMMERCIAL

## 2025-06-02 VITALS — BODY MASS INDEX: 28.16 KG/M2 | WEIGHT: 169 LBS | HEIGHT: 65 IN

## 2025-06-02 DIAGNOSIS — Z96.641 PRESENCE OF RIGHT ARTIFICIAL HIP JOINT: ICD-10-CM

## 2025-06-02 PROCEDURE — 99024 POSTOP FOLLOW-UP VISIT: CPT

## 2025-06-02 PROCEDURE — 73502 X-RAY EXAM HIP UNI 2-3 VIEWS: CPT

## 2025-06-06 ENCOUNTER — TRANSCRIPTION ENCOUNTER (OUTPATIENT)
Age: 65
End: 2025-06-06

## 2025-06-17 ENCOUNTER — OUTPATIENT (OUTPATIENT)
Dept: OUTPATIENT SERVICES | Facility: HOSPITAL | Age: 65
LOS: 1 days | End: 2025-06-17
Payer: COMMERCIAL

## 2025-06-17 ENCOUNTER — APPOINTMENT (OUTPATIENT)
Dept: MRI IMAGING | Facility: HOSPITAL | Age: 65
End: 2025-06-17

## 2025-06-17 DIAGNOSIS — G40.909 EPILEPSY, UNSPECIFIED, NOT INTRACTABLE, WITHOUT STATUS EPILEPTICUS: ICD-10-CM

## 2025-06-17 DIAGNOSIS — Z98.1 ARTHRODESIS STATUS: Chronic | ICD-10-CM

## 2025-06-17 DIAGNOSIS — Z98.890 OTHER SPECIFIED POSTPROCEDURAL STATES: Chronic | ICD-10-CM

## 2025-06-17 DIAGNOSIS — Z95.0 PRESENCE OF CARDIAC PACEMAKER: ICD-10-CM

## 2025-06-17 PROCEDURE — 71046 X-RAY EXAM CHEST 2 VIEWS: CPT

## 2025-06-17 PROCEDURE — 70551 MRI BRAIN STEM W/O DYE: CPT

## 2025-06-17 PROCEDURE — 71046 X-RAY EXAM CHEST 2 VIEWS: CPT | Mod: 26

## 2025-06-17 PROCEDURE — 76377 3D RENDER W/INTRP POSTPROCES: CPT

## 2025-06-17 PROCEDURE — 70551 MRI BRAIN STEM W/O DYE: CPT | Mod: 26

## 2025-06-17 PROCEDURE — 93280 PM DEVICE PROGR EVAL DUAL: CPT | Mod: 26

## 2025-06-17 PROCEDURE — 76377 3D RENDER W/INTRP POSTPROCES: CPT | Mod: 26

## 2025-06-17 RX ORDER — NAPROXEN 500 MG/1
500 TABLET ORAL
Qty: 60 | Refills: 2 | Status: ACTIVE | COMMUNITY
Start: 2025-06-17 | End: 1900-01-01

## 2025-06-18 ENCOUNTER — NON-APPOINTMENT (OUTPATIENT)
Age: 65
End: 2025-06-18

## 2025-06-23 ENCOUNTER — NON-APPOINTMENT (OUTPATIENT)
Age: 65
End: 2025-06-23

## 2025-06-23 ENCOUNTER — APPOINTMENT (OUTPATIENT)
Dept: NEUROLOGY | Facility: CLINIC | Age: 65
End: 2025-06-23

## 2025-06-24 ENCOUNTER — APPOINTMENT (OUTPATIENT)
Dept: NEUROLOGY | Facility: CLINIC | Age: 65
End: 2025-06-24
Payer: COMMERCIAL

## 2025-06-24 VITALS
SYSTOLIC BLOOD PRESSURE: 127 MMHG | BODY MASS INDEX: 27.82 KG/M2 | DIASTOLIC BLOOD PRESSURE: 78 MMHG | HEIGHT: 65 IN | WEIGHT: 167 LBS | HEART RATE: 69 BPM

## 2025-06-24 PROBLEM — I63.81 LACUNAR INFARCTION: Status: ACTIVE | Noted: 2025-06-24

## 2025-06-24 PROCEDURE — G2211 COMPLEX E/M VISIT ADD ON: CPT

## 2025-06-24 PROCEDURE — 99205 OFFICE O/P NEW HI 60 MIN: CPT

## 2025-06-26 ENCOUNTER — APPOINTMENT (OUTPATIENT)
Dept: NEUROLOGY | Facility: CLINIC | Age: 65
End: 2025-06-26
Payer: COMMERCIAL

## 2025-06-26 ENCOUNTER — APPOINTMENT (OUTPATIENT)
Dept: ORTHOPEDIC SURGERY | Facility: CLINIC | Age: 65
End: 2025-06-26
Payer: COMMERCIAL

## 2025-06-26 VITALS — WEIGHT: 167 LBS | BODY MASS INDEX: 27.82 KG/M2 | HEIGHT: 65 IN

## 2025-06-26 PROCEDURE — 93892 TCD EMBOLI DETECT W/O INJ: CPT

## 2025-06-26 PROCEDURE — 93886 INTRACRANIAL COMPLETE STUDY: CPT

## 2025-06-26 PROCEDURE — 99024 POSTOP FOLLOW-UP VISIT: CPT

## 2025-06-26 PROCEDURE — 93880 EXTRACRANIAL BILAT STUDY: CPT

## 2025-06-27 ENCOUNTER — APPOINTMENT (OUTPATIENT)
Dept: ORTHOPEDIC SURGERY | Facility: CLINIC | Age: 65
End: 2025-06-27

## 2025-06-30 LAB
CHOLEST SERPL-MCNC: 200 MG/DL
HDLC SERPL-MCNC: 62 MG/DL
LDLC SERPL-MCNC: 121 MG/DL
NONHDLC SERPL-MCNC: 138 MG/DL
TRIGL SERPL-MCNC: 98 MG/DL

## 2025-07-03 ENCOUNTER — APPOINTMENT (OUTPATIENT)
Dept: INTERNAL MEDICINE | Facility: CLINIC | Age: 65
End: 2025-07-03
Payer: COMMERCIAL

## 2025-07-03 VITALS
BODY MASS INDEX: 27.49 KG/M2 | HEART RATE: 65 BPM | WEIGHT: 165 LBS | DIASTOLIC BLOOD PRESSURE: 70 MMHG | TEMPERATURE: 98.5 F | SYSTOLIC BLOOD PRESSURE: 100 MMHG | OXYGEN SATURATION: 98 % | HEIGHT: 65 IN

## 2025-07-03 PROBLEM — E04.1 THYROID NODULE: Status: ACTIVE | Noted: 2025-07-03

## 2025-07-03 PROCEDURE — 99214 OFFICE O/P EST MOD 30 MIN: CPT

## 2025-07-03 PROCEDURE — 36415 COLL VENOUS BLD VENIPUNCTURE: CPT

## 2025-07-03 PROCEDURE — G2211 COMPLEX E/M VISIT ADD ON: CPT

## 2025-07-03 RX ORDER — ROSUVASTATIN CALCIUM 10 MG/1
10 TABLET, FILM COATED ORAL
Qty: 90 | Refills: 0 | Status: ACTIVE | COMMUNITY
Start: 2025-07-03 | End: 1900-01-01

## 2025-07-08 ENCOUNTER — TRANSCRIPTION ENCOUNTER (OUTPATIENT)
Age: 65
End: 2025-07-08

## 2025-07-08 LAB
T3FREE SERPL-MCNC: 2.66 PG/ML
T4 FREE SERPL-MCNC: 1.1 NG/DL
TSH SERPL-ACNC: 1.15 UIU/ML

## 2025-07-09 ENCOUNTER — OUTPATIENT (OUTPATIENT)
Dept: OUTPATIENT SERVICES | Facility: HOSPITAL | Age: 65
LOS: 1 days | End: 2025-07-09
Payer: COMMERCIAL

## 2025-07-09 ENCOUNTER — APPOINTMENT (OUTPATIENT)
Dept: ULTRASOUND IMAGING | Facility: CLINIC | Age: 65
End: 2025-07-09
Payer: COMMERCIAL

## 2025-07-09 DIAGNOSIS — Z98.1 ARTHRODESIS STATUS: Chronic | ICD-10-CM

## 2025-07-09 DIAGNOSIS — Z98.890 OTHER SPECIFIED POSTPROCEDURAL STATES: Chronic | ICD-10-CM

## 2025-07-09 DIAGNOSIS — I63.81 OTHER CEREBRAL INFARCTION DUE TO OCCLUSION OR STENOSIS OF SMALL ARTERY: ICD-10-CM

## 2025-07-09 PROCEDURE — 71046 X-RAY EXAM CHEST 2 VIEWS: CPT | Mod: 26

## 2025-07-09 PROCEDURE — 76536 US EXAM OF HEAD AND NECK: CPT

## 2025-07-09 PROCEDURE — 70551 MRI BRAIN STEM W/O DYE: CPT

## 2025-07-09 PROCEDURE — 71046 X-RAY EXAM CHEST 2 VIEWS: CPT

## 2025-07-09 PROCEDURE — 70544 MR ANGIOGRAPHY HEAD W/O DYE: CPT

## 2025-07-09 PROCEDURE — 70549 MR ANGIOGRAPH NECK W/O&W/DYE: CPT | Mod: 26

## 2025-07-09 PROCEDURE — 70551 MRI BRAIN STEM W/O DYE: CPT | Mod: 26

## 2025-07-09 PROCEDURE — 70549 MR ANGIOGRAPH NECK W/O&W/DYE: CPT

## 2025-07-09 PROCEDURE — 70544 MR ANGIOGRAPHY HEAD W/O DYE: CPT | Mod: 26,59

## 2025-07-17 ENCOUNTER — TRANSCRIPTION ENCOUNTER (OUTPATIENT)
Age: 65
End: 2025-07-17

## 2025-07-22 ENCOUNTER — NON-APPOINTMENT (OUTPATIENT)
Age: 65
End: 2025-07-22

## 2025-07-29 ENCOUNTER — NON-APPOINTMENT (OUTPATIENT)
Age: 65
End: 2025-07-29

## 2025-07-29 ENCOUNTER — APPOINTMENT (OUTPATIENT)
Dept: CARDIOLOGY | Facility: CLINIC | Age: 65
End: 2025-07-29
Payer: COMMERCIAL

## 2025-07-29 VITALS
OXYGEN SATURATION: 99 % | WEIGHT: 165 LBS | SYSTOLIC BLOOD PRESSURE: 129 MMHG | BODY MASS INDEX: 27.49 KG/M2 | DIASTOLIC BLOOD PRESSURE: 81 MMHG | HEART RATE: 69 BPM | HEIGHT: 65 IN

## 2025-07-29 DIAGNOSIS — I65.29 OCCLUSION AND STENOSIS OF UNSPECIFIED CAROTID ARTERY: ICD-10-CM

## 2025-07-29 DIAGNOSIS — Z86.69 PERSONAL HISTORY OF OTHER DISEASES OF THE NERVOUS SYSTEM AND SENSE ORGANS: ICD-10-CM

## 2025-07-29 DIAGNOSIS — I49.5 SICK SINUS SYNDROME: ICD-10-CM

## 2025-07-29 DIAGNOSIS — E78.00 PURE HYPERCHOLESTEROLEMIA, UNSPECIFIED: ICD-10-CM

## 2025-07-29 DIAGNOSIS — Z95.0 PRESENCE OF CARDIAC PACEMAKER: ICD-10-CM

## 2025-07-29 PROCEDURE — 99215 OFFICE O/P EST HI 40 MIN: CPT

## 2025-07-29 PROCEDURE — 93000 ELECTROCARDIOGRAM COMPLETE: CPT

## 2025-07-31 PROBLEM — I65.29 CAROTID ARTERY PLAQUE: Status: ACTIVE | Noted: 2025-07-31

## 2025-07-31 PROBLEM — E78.00 HYPERCHOLESTEROLEMIA: Status: ACTIVE | Noted: 2025-07-03

## 2025-07-31 PROBLEM — E78.00 ELEVATED LDL CHOLESTEROL LEVEL: Status: ACTIVE | Noted: 2025-07-31

## 2025-08-04 ENCOUNTER — APPOINTMENT (OUTPATIENT)
Dept: CARDIOLOGY | Facility: CLINIC | Age: 65
End: 2025-08-04
Payer: COMMERCIAL

## 2025-08-04 ENCOUNTER — NON-APPOINTMENT (OUTPATIENT)
Age: 65
End: 2025-08-04

## 2025-08-04 VITALS
HEART RATE: 65 BPM | OXYGEN SATURATION: 100 % | DIASTOLIC BLOOD PRESSURE: 79 MMHG | WEIGHT: 165 LBS | SYSTOLIC BLOOD PRESSURE: 144 MMHG | HEIGHT: 65 IN | BODY MASS INDEX: 27.49 KG/M2

## 2025-08-04 PROCEDURE — 96041 GENETIC COUNSELING SVC EA 30: CPT

## 2025-08-04 PROCEDURE — 93000 ELECTROCARDIOGRAM COMPLETE: CPT

## 2025-08-04 PROCEDURE — 99245 OFF/OP CONSLTJ NEW/EST HI 55: CPT

## 2025-08-14 ENCOUNTER — APPOINTMENT (OUTPATIENT)
Dept: NEUROLOGY | Facility: CLINIC | Age: 65
End: 2025-08-14

## 2025-08-18 ENCOUNTER — RX RENEWAL (OUTPATIENT)
Age: 65
End: 2025-08-18

## 2025-08-22 ENCOUNTER — APPOINTMENT (OUTPATIENT)
Dept: NUTRITION | Facility: CLINIC | Age: 65
End: 2025-08-22
Payer: COMMERCIAL

## 2025-08-22 PROCEDURE — 97802 MEDICAL NUTRITION INDIV IN: CPT | Mod: 95

## 2025-09-19 ENCOUNTER — APPOINTMENT (OUTPATIENT)
Dept: NUTRITION | Facility: CLINIC | Age: 65
End: 2025-09-19

## (undated) DEVICE — TUBING SUCTION CONN 6FT STERILE

## (undated) DEVICE — SUT QUILL MONODERM 0 1/2 CIRCLE TAPR 45CM 26MM

## (undated) DEVICE — SUT HEWSON RETRIEVER

## (undated) DEVICE — SAW BLADE STRYKER DUAL CUT 1.27X11 X90MM

## (undated) DEVICE — SYR IV POSIFLUSH NS 3ML 30/TY

## (undated) DEVICE — PACK DAA HIP

## (undated) DEVICE — TRAP QUICK CATCH  SINGL CHAMBER

## (undated) DEVICE — CATH IV SAFE BC 22G X 1" (BLUE)

## (undated) DEVICE — FORCEP RADIAL JAW 4 W NDL 2.4MM 2.8MM 240CM ORANGE DISP

## (undated) DEVICE — SOL IRR POUR H2O 500ML

## (undated) DEVICE — BRUSH CYTO ENDO

## (undated) DEVICE — SUT MONOCRYL 2-0 27" SH UNDYED

## (undated) DEVICE — RADIAL JAW 4 LG CAPACITY WITH NDL

## (undated) DEVICE — SUT QUILL MONODERM 2-0 3/8 CIRCLE 45CM

## (undated) DEVICE — GOWN SURGEON VEST

## (undated) DEVICE — TUBE O2 SUPL CRUSH RESIS CONN SOUTHSIDE ONLY

## (undated) DEVICE — TUBING ENDO EXT OLYMPUS 160 24HR USE

## (undated) DEVICE — SOL INJ NS 0.9% 1000ML

## (undated) DEVICE — SYR LUER SLIP TIP 30CC

## (undated) DEVICE — TUBING SUCTION 20FT

## (undated) DEVICE — SOL INJ NS 0.9% 500ML 1-PORT

## (undated) DEVICE — DRAPE SUPINE ESYSUIT

## (undated) DEVICE — MASK O2 NON REBREATH 3IN1 ADULT

## (undated) DEVICE — SENSOR O2 FINGER XL ADULT 24/BX 6BX/CA

## (undated) DEVICE — CATH IV SAFE BC 20G X 1.16" (PINK)

## (undated) DEVICE — BRUSH COLONOSCOPY CYTOLOGY

## (undated) DEVICE — FOLEY HOLDER STATLOCK 2 WAY ADULT

## (undated) DEVICE — FORMALIN CUPS 10% BUFFERED

## (undated) DEVICE — VALVE BIOPSY

## (undated) DEVICE — SUCTION YANKAUER NO CONTROL VENT

## (undated) DEVICE — SYR LUER SLIP TIP 50CC

## (undated) DEVICE — FORCEP RADIAL JAW 4 JUMBO 2.8MM 3.2MM 240CM ORANGE DISP

## (undated) DEVICE — TUBE RECTAL 24FR

## (undated) DEVICE — ELCTR GROUNDING PAD ADULT COVIDIEN

## (undated) DEVICE — SNARE LRG

## (undated) DEVICE — SUT QUILL PDO 1 45CM CTX 48MM

## (undated) DEVICE — POLY TRAP ETRAP

## (undated) DEVICE — PACK IV START WITH CHG

## (undated) DEVICE — ENDOCUFF VISION SZ 2 LG GRN

## (undated) DEVICE — GLV 7.5 PROTEXIS (WHITE)

## (undated) DEVICE — Device

## (undated) DEVICE — STERIS DEFENDO 3-PIECE KIT (AIR/WATER, SUCTION & BIOPSY VALVES)

## (undated) DEVICE — SNARE POLYP SENS 27MM 240CM

## (undated) DEVICE — SNARE CAPTIVATOR II RND COLD 10MM

## (undated) DEVICE — NDL INJ SCLERO INTERJECT 23G

## (undated) DEVICE — CANISTER SUCTION 1200CC 10/SL

## (undated) DEVICE — MARKER ENDO SPOT EX

## (undated) DEVICE — MASK OXYGEN PANORAMIC

## (undated) DEVICE — RETRIEVER ROTH NET PLATINUM-UNIVERSAL

## (undated) DEVICE — DRSG DERMABOND 0.7ML

## (undated) DEVICE — DRAPE SURGICAL #1010

## (undated) DEVICE — ENDOCUFF VISION SZ 3 SM PRPL

## (undated) DEVICE — SUCTION YANKAUER TAPERED BULBOUS NO VENT

## (undated) DEVICE — SYR ALLIANCE II INFLATION 60ML

## (undated) DEVICE — CATH ELECHMSTAT  INJ 7FR 210CM

## (undated) DEVICE — VENODYNE/SCD SLEEVE CALF MEDIUM

## (undated) DEVICE — SOL IRR POUR H2O 1000ML

## (undated) DEVICE — SYR ORISE GEL SNGL PACK

## (undated) DEVICE — SENSOR O2 FINGER ADULT

## (undated) DEVICE — WARMING BLANKET UPPER ADULT

## (undated) DEVICE — TUBING IV SET SECONDARY 34"

## (undated) DEVICE — ELCTR BOVIE PENCIL SMOKE EVACUATION

## (undated) DEVICE — GLV 8.5 PROTEXIS (BLUE)

## (undated) DEVICE — TUBING CANNULA SALTER LABS NASAL ADULT 7FT

## (undated) DEVICE — TUBING IV SET SECOND 34" W/O LOK-BLUNT

## (undated) DEVICE — GLV 8 PROTEXIS (CREAM) NEU-THERA

## (undated) DEVICE — HOOD T7 PLUS PEELAWAY

## (undated) DEVICE — SOL INJ NS 0.9% 500ML 2 PORT

## (undated) DEVICE — TUBING IV SET GRAVITY 3Y 100" MACRO

## (undated) DEVICE — GLV 8.5 PROTEXIS (WHITE)

## (undated) DEVICE — SOL IRR POUR NS 0.9% 500ML

## (undated) DEVICE — BITE BLOCK MAXI RUBBER STAMP

## (undated) DEVICE — STRYKER PULSE LAVAGE WITH HIGH FLOW TIP

## (undated) DEVICE — TUBING TUR 2 PRONG

## (undated) DEVICE — LIGHT PIPE

## (undated) DEVICE — SUT PDO 2 1/2 CIRCLE 40MM NDL 45CM

## (undated) DEVICE — BALLOON US ENDO

## (undated) DEVICE — POSITIONER FOAM HEADREST (PINK)

## (undated) DEVICE — FORCEP RADIAL JAW 4 W NDL 2.2MM 2.8MM 160CM YELLOW DISP

## (undated) DEVICE — HOOD T7 NON-PEELAWAY

## (undated) DEVICE — DRSG CURITY GAUZE SPONGE 4 X 4" 12-PLY

## (undated) DEVICE — DRSG AQUACEL 3.5 X 10"

## (undated) DEVICE — BITE BLOCK ADULT 20 X 27MM (GREEN)